# Patient Record
Sex: FEMALE | Race: WHITE | NOT HISPANIC OR LATINO | Employment: OTHER | URBAN - NONMETROPOLITAN AREA
[De-identification: names, ages, dates, MRNs, and addresses within clinical notes are randomized per-mention and may not be internally consistent; named-entity substitution may affect disease eponyms.]

---

## 2017-01-11 ENCOUNTER — OFFICE VISIT (OUTPATIENT)
Dept: PSYCHIATRY | Facility: CLINIC | Age: 72
End: 2017-01-11

## 2017-01-11 VITALS
SYSTOLIC BLOOD PRESSURE: 121 MMHG | WEIGHT: 174 LBS | BODY MASS INDEX: 27.31 KG/M2 | HEART RATE: 97 BPM | HEIGHT: 67 IN | DIASTOLIC BLOOD PRESSURE: 62 MMHG

## 2017-01-11 DIAGNOSIS — F31.30 BIPOLAR I DISORDER, MOST RECENT EPISODE DEPRESSED (HCC): Primary | ICD-10-CM

## 2017-01-11 DIAGNOSIS — F41.9 ANXIETY DISORDER, UNSPECIFIED TYPE: ICD-10-CM

## 2017-01-11 DIAGNOSIS — F03.90 DEMENTIA WITHOUT BEHAVIORAL DISTURBANCE, UNSPECIFIED DEMENTIA TYPE: ICD-10-CM

## 2017-01-11 PROCEDURE — 99213 OFFICE O/P EST LOW 20 MIN: CPT | Performed by: NURSE PRACTITIONER

## 2017-01-11 RX ORDER — DULOXETIN HYDROCHLORIDE 60 MG/1
60 CAPSULE, DELAYED RELEASE ORAL 2 TIMES DAILY
Qty: 60 CAPSULE | Refills: 1 | Status: SHIPPED | OUTPATIENT
Start: 2017-01-11 | End: 2017-02-28 | Stop reason: SDUPTHER

## 2017-01-11 RX ORDER — TRAZODONE HYDROCHLORIDE 150 MG/1
150 TABLET ORAL NIGHTLY
Qty: 30 TABLET | Refills: 1 | Status: SHIPPED | OUTPATIENT
Start: 2017-01-11 | End: 2017-02-28

## 2017-01-11 RX ORDER — QUETIAPINE 150 MG/1
150 TABLET, FILM COATED, EXTENDED RELEASE ORAL NIGHTLY
Qty: 30 TABLET | Refills: 1 | Status: SHIPPED | OUTPATIENT
Start: 2017-01-11 | End: 2017-02-06 | Stop reason: CLARIF

## 2017-01-11 RX ORDER — BUPROPION HYDROCHLORIDE 300 MG/1
300 TABLET ORAL EVERY MORNING
Qty: 30 TABLET | Refills: 1 | Status: SHIPPED | OUTPATIENT
Start: 2017-01-11 | End: 2017-02-28 | Stop reason: SDUPTHER

## 2017-01-11 RX ORDER — DIVALPROEX SODIUM 500 MG/1
500 TABLET, DELAYED RELEASE ORAL NIGHTLY
Qty: 30 TABLET | Refills: 1 | Status: ON HOLD | OUTPATIENT
Start: 2017-01-11 | End: 2017-02-09

## 2017-01-11 NOTE — MR AVS SNAPSHOT
Sunshine Her   1/11/2017 3:40 PM   Office Visit    Dept Phone:  [phone]   Encounter #:  [id number]    Provider:  MONTANA Jose   Department:  [facility] BEHAVIORAL HEALTH                Your Full Care Plan              Today's Medication Changes          These changes are accurate as of: 1/11/17  4:46 PM.  If you have any questions, ask your nurse or doctor.               New Medication(s)Ordered:     divalproex 500 MG DR tablet   Commonly known as:  DEPAKOTE   Take 1 tablet by mouth Every Night.         Medication(s)that have changed:     traZODone 150 MG tablet   Commonly known as:  DESYREL   Take 1 tablet by mouth Every Night.   What changed:  See the new instructions.            Where to Get Your Medications      These medications were sent to [facility] [address] [place] [address] [phone]  - [phone]   [address] [place] [address]     Phone:  [phone]     buPROPion  MG 24 hr tablet    divalproex 500 MG DR tablet    DULoxetine 60 MG capsule    QUEtiapine  MG 24 hr tablet    traZODone 150 MG tablet                  Your Updated Medication List          This list is accurate as of: 1/11/17  4:46 PM.  Always use your most recent med list.                acetaminophen 325 MG tablet   Commonly known as:  TYLENOL   Take 2 tablets by mouth Every 4 (Four) Hours As Needed for mild pain (1-3) or fever (Temperature greater than or equal to 37 C).       amLODIPine 5 MG tablet   Commonly known as:  NORVASC   Take 1 tablet by mouth Daily.       aspirin 81 MG chewable tablet   Chew 1 tablet Daily.       atorvastatin 40 MG tablet   Commonly known as:  LIPITOR   Take 1 tablet by mouth Daily.       buPROPion  MG 24 hr tablet   Commonly known as:  WELLBUTRIN XL   Take 1 tablet by mouth Every Morning.       clopidogrel 75 MG tablet   Commonly known as:  PLAVIX   Take 1 tablet by mouth Daily.       divalproex 500 MG  DR tablet   Commonly known as:  DEPAKOTE   Take 1 tablet by mouth Every Night.       donepezil 10 MG tablet   Commonly known as:  ARICEPT   Take 1 tablet by mouth Daily.       DULoxetine 60 MG capsule   Commonly known as:  CYMBALTA   Take 1 capsule by mouth 2 (Two) Times a Day.       esomeprazole 40 MG capsule   Commonly known as:  nexIUM       fluticasone-salmeterol 100-50 MCG/DOSE DISKUS   Commonly known as:  ADVAIR       levothyroxine 25 MCG tablet   Commonly known as:  SYNTHROID, LEVOTHROID       memantine 10 MG tablet   Commonly known as:  NAMENDA   Take 1 tablet by mouth 2 (two) times a day.       nicotine 21 MG/24HR patch   Commonly known as:  NICODERM CQ   Place 1 patch on the skin Daily.       QUEtiapine  MG 24 hr tablet   Commonly known as:  SEROquel XR   Take 1 tablet by mouth Every Night.       traZODone 150 MG tablet   Commonly known as:  DESYREL   Take 1 tablet by mouth Every Night.               You Were Diagnosed With        Codes Comments    Bipolar I disorder, most recent episode depressed    -  Primary ICD-10-CM: F31.30  ICD-9-CM: 296.50     Anxiety disorder, unspecified type     ICD-10-CM: F41.9  ICD-9-CM: 300.00     Dementia without behavioral disturbance, unspecified dementia type     ICD-10-CM: F03.90  ICD-9-CM: 294.20       Instructions     None    Patient Instructions History      Upcoming Appointments     Visit Type Date Time Department    MEDICINE CHECK 1/11/2017  3:40 PM [facility]    FOLLOW UP 2/8/2017 11:30 AM [facility] CHRISTINA    MEDICINE CHECK 2/9/2017 11:00 AM [facility] CACERES      [employer] Signup     [facility] [employer] allows you to send messages to your doctor, view your test results, renew your prescriptions, schedule appointments, and more. To sign up, go to [employer] and click on the Sign Up Now link in the New User? box. Enter your [employer] Activation Code exactly as it appears below along with the last four digits of your Social Security Number  "and your Date of Birth () to complete the sign-up process. If you do not sign up before the expiration date, you must request a new code.    [employer] Activation Code: [email or web address]  Expires: 2017  4:46 PM    If you have questions, you can email [email or web address] or call [phone] to talk to our [employer] staff. Remember, [employer] is NOT to be used for urgent needs. For medical emergencies, dial 911.               Other Info from Your Visit           Your Appointments     2017 11:30 AM EST   Follow Up with Anastacio Adamson MD   [facility] NEUROLOGY (--)    [address] [place] [address]. [address]  [facility] [address] [phone]           Arrive 15 minutes prior to appointment.            2017 11:00 AM EST   Medicine Check with MONTANA Jose   [facility] BEHAVIORAL HEALTH (--)    [address] [facility] [address] [phone]              Allergies     No Known Allergies      Vital Signs     Blood Pressure Pulse Height Weight Body Mass Index Smoking Status    121/62 97 67\" (170.2 cm) 174 lb (78.9 kg) 27.25 kg/m2 Current Every Day Smoker      Problems and Diagnoses Noted     Dementia    Bipolar I disorder, most recent episode depressed    -  Primary    Anxiety disorder, unspecified type            "

## 2017-01-11 NOTE — PROGRESS NOTES
Stacy Her is a 71 y.o. female who is here today for medication management follow up at the [facility], she presents with her daughter with whom she gives permission to speak openly in front of.  She presents to her appointment on time.      Chief Complaint:  Follow-up     History of Present Illness  She states that she is doing well with her medications, denies any SE.  She states that she is not able to sleep because of racing thoughts, been picking at herself with anxiety.  She shares that she is sleeping less than about 6 hours per night with no NM.  She feels slightly, denies any feelings of being hyper, however she feels in a good mood and noted to be rapidly speaking.  She states that she has been worrying more lately, especially since last stroke.  She states that she is eating well, noted to have gained some weight.  She denies any new stressors but feels jittery.  She denies any new health issues.  Denies any AV hallucinations, denies any SI/HI.  Sh was previously on depakote but taken off because of increased sedation, was on it twice, will take at night.      The following portions of the patient's history were reviewed and updated as appropriate: allergies, current medications, past family history, past medical history, past social history, past surgical history and problem list.    Review of Systems   Constitutional: Negative for appetite change, chills, diaphoresis, fatigue, fever and unexpected weight change.   HENT: Negative for hearing loss, sore throat, trouble swallowing and voice change.    Eyes: Negative for photophobia and visual disturbance.   Respiratory: Negative for cough, chest tightness and shortness of breath.    Cardiovascular: Negative for chest pain and palpitations.   Gastrointestinal: Negative for abdominal pain, constipation, nausea and vomiting.   Endocrine: Negative for cold intolerance and heat intolerance.   Genitourinary: Negative for dysuria and frequency.  "  Musculoskeletal: Positive for gait problem. Negative for arthralgias, back pain, joint swelling and neck stiffness.   Skin: Negative for color change and wound.   Allergic/Immunologic: Negative for environmental allergies and immunocompromised state.   Neurological: Negative for dizziness, tremors, seizures, syncope, weakness, light-headedness and headaches.   Hematological: Negative for adenopathy. Does not bruise/bleed easily.       Objective   Physical Exam   Constitutional: She appears well-developed and well-nourished. No distress.   Neurological: She is alert. Coordination and gait normal.   Vitals reviewed.    Blood pressure 121/62, pulse 97, height 67\" (170.2 cm), weight 174 lb (78.9 kg).    No Known Allergies    Current Medications:   Current Outpatient Prescriptions   Medication Sig Dispense Refill   • acetaminophen (TYLENOL) 325 MG tablet Take 2 tablets by mouth Every 4 (Four) Hours As Needed for mild pain (1-3) or fever (Temperature greater than or equal to 37 C).  0   • amLODIPine (NORVASC) 5 MG tablet Take 1 tablet by mouth Daily. 30 tablet 0   • aspirin 81 MG chewable tablet Chew 1 tablet Daily.     • atorvastatin (LIPITOR) 40 MG tablet Take 1 tablet by mouth Daily. (Patient taking differently: Take 40 mg by mouth Every Night.)     • buPROPion XL (WELLBUTRIN XL) 300 MG 24 hr tablet Take 1 tablet by mouth Every Morning. 30 tablet 1   • clopidogrel (PLAVIX) 75 MG tablet Take 1 tablet by mouth Daily. 30 tablet    • divalproex (DEPAKOTE) 500 MG DR tablet Take 1 tablet by mouth Every Night. 30 tablet 1   • donepezil (ARICEPT) 10 MG tablet Take 1 tablet by mouth Daily. 30 tablet 11   • DULoxetine (CYMBALTA) 60 MG capsule Take 1 capsule by mouth 2 (Two) Times a Day. 60 capsule 1   • esomeprazole (NexIUM) 40 MG capsule Take 40 mg by mouth every morning before breakfast.     • fluticasone-salmeterol (ADVAIR) 100-50 MCG/DOSE DISKUS Inhale 1 puff 2 (Two) Times a Day. Patients family states they are not sure " this is the right mg but her pharmacy is [facility].     • levothyroxine (SYNTHROID, LEVOTHROID) 25 MCG tablet Take 25 mcg by mouth daily.     • memantine (NAMENDA) 10 MG tablet Take 1 tablet by mouth 2 (two) times a day. 60 tablet 1   • nicotine (NICODERM CQ) 21 MG/24HR patch Place 1 patch on the skin Daily.     • QUEtiapine XR (SEROquel XR) 150 MG 24 hr tablet Take 1 tablet by mouth Every Night. 30 tablet 1   • traZODone (DESYREL) 150 MG tablet Take 1 tablet by mouth Every Night. 30 tablet 1     No current facility-administered medications for this visit.        Mental Status Exam:   Hygiene:   good  Cooperation:  Cooperative  Eye Contact:  Fair  Psychomotor Behavior:  Appropriate  Affect:  Appropriate  Hopelessness: Denies  Speech:  Normal  Thought Process:  Goal directed  Thought Content:  Normal  Suicidal:  None  Homicidal:  None  Hallucinations:  None  Delusion:  None  Memory:  Intact  Orientation:  Person, Place, Time and Situation  Reliability:  fair  Insight:  Fair  Judgement:  Fair  Impulse Control:  Fair  Physical/Medical Issues:  No     Assessment/Plan   Bipolar I disorder, most recent episode depressed    Anxiety disorder, unspecified type    Dementia without behavioral disturbance, unspecified dementia type    Other orders  -     buPROPion XL (WELLBUTRIN XL) 300 MG 24 hr tablet; Take 1 tablet by mouth Every Morning.  -     DULoxetine (CYMBALTA) 60 MG capsule; Take 1 capsule by mouth 2 (Two) Times a Day.  -     QUEtiapine XR (SEROquel XR) 150 MG 24 hr tablet; Take 1 tablet by mouth Every Night.  -     traZODone (DESYREL) 150 MG tablet; Take 1 tablet by mouth Every Night.  -     divalproex (DEPAKOTE) 500 MG DR tablet; Take 1 tablet by mouth Every Night.      Discused medications options.  Continue current medications, add depakote at night.    Discussed the risks, beneefits, and side effects of the medications; patient ackowledged and verbally consentedd.  Patient is aware to call the [facility] with  any worsening of symptoms.  Patient is agreeable to call 911 or go to the nearest ER should he/she begin having SI/HI.    Return in 4 weeks

## 2017-02-06 ENCOUNTER — TELEPHONE (OUTPATIENT)
Dept: PSYCHIATRY | Facility: CLINIC | Age: 72
End: 2017-02-06

## 2017-02-06 DIAGNOSIS — F31.32 BIPOLAR AFFECTIVE DISORDER, CURRENTLY DEPRESSED, MODERATE (HCC): Primary | ICD-10-CM

## 2017-02-06 RX ORDER — QUETIAPINE FUMARATE 100 MG/1
150 TABLET, FILM COATED ORAL NIGHTLY
Qty: 45 TABLET | Refills: 1 | Status: SHIPPED | OUTPATIENT
Start: 2017-02-06 | End: 2017-02-28 | Stop reason: SDUPTHER

## 2017-02-06 NOTE — TELEPHONE ENCOUNTER
Patient's daughter came in and said that patients insurance will not cover the Seroquel xr she is asking that you change it to the regular Seroquel. She is asking that you call her. [phone] or ext #[phone] up stairs

## 2017-02-09 ENCOUNTER — APPOINTMENT (OUTPATIENT)
Dept: CT IMAGING | Facility: HOSPITAL | Age: 72
End: 2017-02-09

## 2017-02-09 ENCOUNTER — HOSPITAL ENCOUNTER (OUTPATIENT)
Facility: HOSPITAL | Age: 72
Setting detail: OBSERVATION
Discharge: HOME | End: 2017-02-12
Attending: INTERNAL MEDICINE | Admitting: FAMILY MEDICINE

## 2017-02-09 ENCOUNTER — HOSPITAL ENCOUNTER (EMERGENCY)
Facility: HOSPITAL | Age: 72
Discharge: ANOTHER HOSPITAL | End: 2017-02-09
Attending: FAMILY MEDICINE | Admitting: FAMILY MEDICINE

## 2017-02-09 ENCOUNTER — APPOINTMENT (OUTPATIENT)
Dept: GENERAL RADIOLOGY | Facility: HOSPITAL | Age: 72
End: 2017-02-09

## 2017-02-09 ENCOUNTER — APPOINTMENT (OUTPATIENT)
Dept: MRI IMAGING | Facility: HOSPITAL | Age: 72
End: 2017-02-09

## 2017-02-09 VITALS
WEIGHT: 167 LBS | HEIGHT: 67 IN | OXYGEN SATURATION: 98 % | BODY MASS INDEX: 26.21 KG/M2 | RESPIRATION RATE: 17 BRPM | DIASTOLIC BLOOD PRESSURE: 77 MMHG | TEMPERATURE: 98.3 F | SYSTOLIC BLOOD PRESSURE: 149 MMHG | HEART RATE: 85 BPM

## 2017-02-09 DIAGNOSIS — I44.1 AV BLOCK, 2ND DEGREE: ICD-10-CM

## 2017-02-09 DIAGNOSIS — Z78.9 IMPAIRED MOBILITY AND ADLS: ICD-10-CM

## 2017-02-09 DIAGNOSIS — Z74.09 IMPAIRED FUNCTIONAL MOBILITY, BALANCE, GAIT, AND ENDURANCE: Primary | ICD-10-CM

## 2017-02-09 DIAGNOSIS — I67.2 ARTERIOSCLEROTIC CEREBROVASCULAR DISEASE: ICD-10-CM

## 2017-02-09 DIAGNOSIS — Z74.09 IMPAIRED MOBILITY AND ADLS: ICD-10-CM

## 2017-02-09 DIAGNOSIS — R42 POSTURAL DIZZINESS WITH PRESYNCOPE: Primary | ICD-10-CM

## 2017-02-09 DIAGNOSIS — R55 POSTURAL DIZZINESS WITH PRESYNCOPE: Primary | ICD-10-CM

## 2017-02-09 PROBLEM — F01.50 VASCULAR DEMENTIA (HCC): Status: ACTIVE | Noted: 2017-02-09

## 2017-02-09 PROBLEM — Z72.0 TOBACCO ABUSE: Status: ACTIVE | Noted: 2017-02-09

## 2017-02-09 PROBLEM — I63.9 CVA (CEREBRAL VASCULAR ACCIDENT): Status: ACTIVE | Noted: 2017-02-09

## 2017-02-09 PROBLEM — I10 HYPERTENSION: Status: ACTIVE | Noted: 2017-02-09

## 2017-02-09 PROBLEM — D64.9 NORMOCYTIC ANEMIA: Status: ACTIVE | Noted: 2017-02-09

## 2017-02-09 PROBLEM — E78.5 HYPERLIPIDEMIA: Status: ACTIVE | Noted: 2017-02-09

## 2017-02-09 PROBLEM — N18.30 CKD (CHRONIC KIDNEY DISEASE), STAGE III (HCC): Status: ACTIVE | Noted: 2017-02-09

## 2017-02-09 LAB
ABO GROUP BLD: NORMAL
ALBUMIN SERPL-MCNC: 4.1 G/DL (ref 3.2–4.8)
ALBUMIN SERPL-MCNC: 4.4 G/DL (ref 3.4–4.8)
ALBUMIN/GLOB SERPL: 1.4 G/DL (ref 1.5–2.5)
ALBUMIN/GLOB SERPL: 1.4 G/DL (ref 1.5–2.5)
ALP SERPL-CCNC: 104 U/L (ref 46–116)
ALP SERPL-CCNC: 90 U/L (ref 25–100)
ALT SERPL W P-5'-P-CCNC: 12 U/L (ref 10–36)
ALT SERPL W P-5'-P-CCNC: 12 U/L (ref 7–40)
AMPHET+METHAMPHET UR QL: NEGATIVE
AMYLASE SERPL-CCNC: 158 U/L (ref 28–100)
ANION GAP SERPL CALCULATED.3IONS-SCNC: 14 MMOL/L (ref 3–11)
ANION GAP SERPL CALCULATED.3IONS-SCNC: 5.7 MMOL/L (ref 3.6–11.2)
ANISOCYTOSIS BLD QL: NORMAL
APTT PPP: 23.5 SECONDS (ref 24.4–31)
AST SERPL-CCNC: 16 U/L (ref 0–33)
AST SERPL-CCNC: 19 U/L (ref 10–30)
BACTERIA UR QL AUTO: ABNORMAL /HPF
BARBITURATES UR QL SCN: NEGATIVE
BASOPHILS # BLD AUTO: 0.03 10*3/MM3 (ref 0–0.2)
BASOPHILS # BLD AUTO: 0.03 10*3/MM3 (ref 0–0.3)
BASOPHILS NFR BLD AUTO: 0.5 % (ref 0–1)
BASOPHILS NFR BLD AUTO: 0.6 % (ref 0–2)
BENZODIAZ UR QL SCN: NEGATIVE
BILIRUB SERPL-MCNC: 0.2 MG/DL (ref 0.2–1.8)
BILIRUB SERPL-MCNC: 0.2 MG/DL (ref 0.3–1.2)
BILIRUB UR QL STRIP: NEGATIVE
BLD GP AB SCN SERPL QL: NEGATIVE
BNP SERPL-MCNC: 50 PG/ML (ref 0–100)
BUN BLD-MCNC: 23 MG/DL (ref 9–23)
BUN BLD-MCNC: 27 MG/DL (ref 7–21)
BUN/CREAT SERPL: 16.1 (ref 7–25)
BUN/CREAT SERPL: 16.4 (ref 7–25)
CALCIUM SPEC-SCNC: 9.2 MG/DL (ref 8.7–10.4)
CALCIUM SPEC-SCNC: 9.4 MG/DL (ref 7.7–10)
CANNABINOIDS SERPL QL: NEGATIVE
CHLORIDE SERPL-SCNC: 106 MMOL/L (ref 99–109)
CHLORIDE SERPL-SCNC: 106 MMOL/L (ref 99–112)
CK MB SERPL-CCNC: <0.18 NG/ML (ref 0–5)
CK MB SERPL-RTO: NORMAL % (ref 0–3)
CK SERPL-CCNC: 56 U/L (ref 24–173)
CLARITY UR: CLEAR
CO2 SERPL-SCNC: 19 MMOL/L (ref 20–31)
CO2 SERPL-SCNC: 28.3 MMOL/L (ref 24.3–31.9)
COCAINE UR QL: NEGATIVE
COLOR UR: YELLOW
CREAT BLD-MCNC: 1.4 MG/DL (ref 0.6–1.3)
CREAT BLD-MCNC: 1.68 MG/DL (ref 0.43–1.29)
CRP SERPL-MCNC: <0.5 MG/DL (ref 0–0.99)
DACRYOCYTES BLD QL SMEAR: NORMAL
DEPRECATED RDW RBC AUTO: 47.3 FL (ref 37–54)
DEPRECATED RDW RBC AUTO: 48.2 FL (ref 37–54)
EOSINOPHIL # BLD AUTO: 0.21 10*3/MM3 (ref 0–0.7)
EOSINOPHIL # BLD AUTO: 0.27 10*3/MM3 (ref 0.1–0.3)
EOSINOPHIL NFR BLD AUTO: 4.1 % (ref 0–7)
EOSINOPHIL NFR BLD AUTO: 4.6 % (ref 0–3)
ERYTHROCYTE [DISTWIDTH] IN BLOOD BY AUTOMATED COUNT: 15.9 % (ref 11.3–14.5)
ERYTHROCYTE [DISTWIDTH] IN BLOOD BY AUTOMATED COUNT: 16.1 % (ref 11.5–14.5)
ERYTHROCYTE [SEDIMENTATION RATE] IN BLOOD: 28 MM/HR (ref 0–30)
GFR SERPL CREATININE-BSD FRML MDRD: 30 ML/MIN/1.73
GFR SERPL CREATININE-BSD FRML MDRD: 37 ML/MIN/1.73
GLOBULIN UR ELPH-MCNC: 2.9 GM/DL
GLOBULIN UR ELPH-MCNC: 3.1 GM/DL
GLUCOSE BLD-MCNC: 102 MG/DL (ref 70–110)
GLUCOSE BLD-MCNC: 85 MG/DL (ref 70–100)
GLUCOSE BLDC GLUCOMTR-MCNC: 88 MG/DL (ref 70–130)
GLUCOSE UR STRIP-MCNC: NEGATIVE MG/DL
HCT VFR BLD AUTO: 26 % (ref 34.5–44)
HCT VFR BLD AUTO: 26.6 % (ref 37–47)
HGB BLD-MCNC: 7.5 G/DL (ref 11.5–15.5)
HGB BLD-MCNC: 7.5 G/DL (ref 12–16)
HGB UR QL STRIP.AUTO: NEGATIVE
HYALINE CASTS UR QL AUTO: ABNORMAL /LPF
HYPOCHROMIA BLD QL: NORMAL
IMM GRANULOCYTES # BLD: 0.03 10*3/MM3 (ref 0–0.03)
IMM GRANULOCYTES # BLD: 0.04 10*3/MM3 (ref 0–0.03)
IMM GRANULOCYTES NFR BLD: 0.5 % (ref 0–0.6)
IMM GRANULOCYTES NFR BLD: 0.8 % (ref 0–0.5)
INR PPP: 0.94 (ref 0.8–1.1)
IRON 24H UR-MRATE: 22 MCG/DL (ref 49–151)
IRON SATN MFR SERPL: 5 % (ref 15–50)
KETONES UR QL STRIP: NEGATIVE
LEUKOCYTE ESTERASE UR QL STRIP.AUTO: ABNORMAL
LIPASE SERPL-CCNC: 27 U/L (ref 13–60)
LYMPHOCYTES # BLD AUTO: 1.2 10*3/MM3 (ref 1–3)
LYMPHOCYTES # BLD AUTO: 1.48 10*3/MM3 (ref 0.6–4.8)
LYMPHOCYTES NFR BLD AUTO: 23.2 % (ref 16–46)
LYMPHOCYTES NFR BLD AUTO: 25.3 % (ref 24–44)
MAGNESIUM SERPL-MCNC: 2.2 MG/DL (ref 1.7–2.6)
MCH RBC QN AUTO: 22.9 PG (ref 27–33)
MCH RBC QN AUTO: 23.1 PG (ref 27–31)
MCHC RBC AUTO-ENTMCNC: 28.2 G/DL (ref 33–37)
MCHC RBC AUTO-ENTMCNC: 28.8 G/DL (ref 32–36)
MCV RBC AUTO: 80 FL (ref 80–99)
MCV RBC AUTO: 81.3 FL (ref 80–94)
METHADONE UR QL SCN: NEGATIVE
MONOCYTES # BLD AUTO: 0.54 10*3/MM3 (ref 0–1)
MONOCYTES # BLD AUTO: 0.62 10*3/MM3 (ref 0.1–0.9)
MONOCYTES NFR BLD AUTO: 12 % (ref 0–12)
MONOCYTES NFR BLD AUTO: 9.2 % (ref 0–12)
MYOGLOBIN SERPL-MCNC: 48 NG/ML (ref 0–109)
NEUTROPHILS # BLD AUTO: 3.07 10*3/MM3 (ref 1.4–6.5)
NEUTROPHILS # BLD AUTO: 3.5 10*3/MM3 (ref 1.5–8.3)
NEUTROPHILS NFR BLD AUTO: 59.3 % (ref 40–75)
NEUTROPHILS NFR BLD AUTO: 59.9 % (ref 41–71)
NITRITE UR QL STRIP: NEGATIVE
OPIATES UR QL: NEGATIVE
OSMOLALITY SERPL CALC.SUM OF ELEC: 284.7 MOSM/KG (ref 273–305)
OVALOCYTES BLD QL SMEAR: NORMAL
OXYCODONE UR QL SCN: NEGATIVE
PCP UR QL SCN: NEGATIVE
PH UR STRIP.AUTO: 8 [PH] (ref 5–8)
PHOSPHATE SERPL-MCNC: 3.1 MG/DL (ref 2.7–4.5)
PLAT MORPH BLD: NORMAL
PLATELET # BLD AUTO: 186 10*3/MM3 (ref 130–400)
PLATELET # BLD AUTO: 187 10*3/MM3 (ref 150–450)
PMV BLD AUTO: 8.1 FL (ref 6–12)
PMV BLD AUTO: 9 FL (ref 6–10)
POTASSIUM BLD-SCNC: 3.9 MMOL/L (ref 3.5–5.5)
POTASSIUM BLD-SCNC: 4.8 MMOL/L (ref 3.5–5.3)
PROPOXYPH UR QL: NEGATIVE
PROT SERPL-MCNC: 7 G/DL (ref 5.7–8.2)
PROT SERPL-MCNC: 7.5 G/DL (ref 6–8)
PROT UR QL STRIP: NEGATIVE
PROTHROMBIN TIME: 10.6 SECONDS (ref 9.8–11.9)
RBC # BLD AUTO: 3.25 10*6/MM3 (ref 3.89–5.14)
RBC # BLD AUTO: 3.27 10*6/MM3 (ref 4.2–5.4)
RBC # UR: ABNORMAL /HPF
REF LAB TEST METHOD: ABNORMAL
RETICS/RBC NFR AUTO: 2.46 % (ref 0.5–1.5)
RH BLD: POSITIVE
SODIUM BLD-SCNC: 139 MMOL/L (ref 132–146)
SODIUM BLD-SCNC: 140 MMOL/L (ref 135–153)
SP GR UR STRIP: 1.02 (ref 1–1.03)
SQUAMOUS #/AREA URNS HPF: ABNORMAL /HPF
TIBC SERPL-MCNC: 439 MCG/DL (ref 241–421)
TROPONIN I SERPL-MCNC: <0.006 NG/ML
TROPONIN I SERPL-MCNC: <0.006 NG/ML
TSH SERPL DL<=0.05 MIU/L-ACNC: 1.61 MIU/ML (ref 0.55–4.78)
UROBILINOGEN UR QL STRIP: ABNORMAL
VALPROATE SERPL-MCNC: <1 MCG/ML (ref 50–100)
WBC NRBC COR # BLD: 5.17 10*3/MM3 (ref 4.5–12.5)
WBC NRBC COR # BLD: 5.85 10*3/MM3 (ref 3.5–10.8)
WBC UR QL AUTO: ABNORMAL /HPF

## 2017-02-09 PROCEDURE — 82550 ASSAY OF CK (CPK): CPT | Performed by: FAMILY MEDICINE

## 2017-02-09 PROCEDURE — 86901 BLOOD TYPING SEROLOGIC RH(D): CPT

## 2017-02-09 PROCEDURE — 70450 CT HEAD/BRAIN W/O DYE: CPT

## 2017-02-09 PROCEDURE — 93005 ELECTROCARDIOGRAM TRACING: CPT | Performed by: NURSE PRACTITIONER

## 2017-02-09 PROCEDURE — 86900 BLOOD TYPING SEROLOGIC ABO: CPT

## 2017-02-09 PROCEDURE — 83880 ASSAY OF NATRIURETIC PEPTIDE: CPT | Performed by: NURSE PRACTITIONER

## 2017-02-09 PROCEDURE — 86140 C-REACTIVE PROTEIN: CPT | Performed by: FAMILY MEDICINE

## 2017-02-09 PROCEDURE — 25010000002 MAGNESIUM SULFATE PER 500 MG OF MAGNESIUM: Performed by: FAMILY MEDICINE

## 2017-02-09 PROCEDURE — 96366 THER/PROPH/DIAG IV INF ADDON: CPT

## 2017-02-09 PROCEDURE — 80307 DRUG TEST PRSMV CHEM ANLYZR: CPT | Performed by: FAMILY MEDICINE

## 2017-02-09 PROCEDURE — 25810000003 DEXTROSE-NACL PER 500 ML: Performed by: FAMILY MEDICINE

## 2017-02-09 PROCEDURE — 93005 ELECTROCARDIOGRAM TRACING: CPT | Performed by: FAMILY MEDICINE

## 2017-02-09 PROCEDURE — 83690 ASSAY OF LIPASE: CPT | Performed by: FAMILY MEDICINE

## 2017-02-09 PROCEDURE — 25010000002 THIAMINE PER 100 MG: Performed by: FAMILY MEDICINE

## 2017-02-09 PROCEDURE — 85730 THROMBOPLASTIN TIME PARTIAL: CPT | Performed by: FAMILY MEDICINE

## 2017-02-09 PROCEDURE — G0378 HOSPITAL OBSERVATION PER HR: HCPCS

## 2017-02-09 PROCEDURE — 85652 RBC SED RATE AUTOMATED: CPT | Performed by: FAMILY MEDICINE

## 2017-02-09 PROCEDURE — 83735 ASSAY OF MAGNESIUM: CPT | Performed by: FAMILY MEDICINE

## 2017-02-09 PROCEDURE — 84484 ASSAY OF TROPONIN QUANT: CPT | Performed by: FAMILY MEDICINE

## 2017-02-09 PROCEDURE — 85610 PROTHROMBIN TIME: CPT | Performed by: FAMILY MEDICINE

## 2017-02-09 PROCEDURE — 85045 AUTOMATED RETICULOCYTE COUNT: CPT | Performed by: INTERNAL MEDICINE

## 2017-02-09 PROCEDURE — 84443 ASSAY THYROID STIM HORMONE: CPT | Performed by: FAMILY MEDICINE

## 2017-02-09 PROCEDURE — 96365 THER/PROPH/DIAG IV INF INIT: CPT

## 2017-02-09 PROCEDURE — 93010 ELECTROCARDIOGRAM REPORT: CPT | Performed by: INTERNAL MEDICINE

## 2017-02-09 PROCEDURE — 70450 CT HEAD/BRAIN W/O DYE: CPT | Performed by: RADIOLOGY

## 2017-02-09 PROCEDURE — 83540 ASSAY OF IRON: CPT | Performed by: FAMILY MEDICINE

## 2017-02-09 PROCEDURE — 80053 COMPREHEN METABOLIC PANEL: CPT | Performed by: NURSE PRACTITIONER

## 2017-02-09 PROCEDURE — 86850 RBC ANTIBODY SCREEN: CPT

## 2017-02-09 PROCEDURE — 82746 ASSAY OF FOLIC ACID SERUM: CPT | Performed by: NURSE PRACTITIONER

## 2017-02-09 PROCEDURE — 85007 BL SMEAR W/DIFF WBC COUNT: CPT | Performed by: FAMILY MEDICINE

## 2017-02-09 PROCEDURE — 80053 COMPREHEN METABOLIC PANEL: CPT | Performed by: FAMILY MEDICINE

## 2017-02-09 PROCEDURE — 70551 MRI BRAIN STEM W/O DYE: CPT

## 2017-02-09 PROCEDURE — 80164 ASSAY DIPROPYLACETIC ACD TOT: CPT | Performed by: FAMILY MEDICINE

## 2017-02-09 PROCEDURE — 83550 IRON BINDING TEST: CPT | Performed by: FAMILY MEDICINE

## 2017-02-09 PROCEDURE — 71010 HC CHEST PA OR AP: CPT

## 2017-02-09 PROCEDURE — 84100 ASSAY OF PHOSPHORUS: CPT | Performed by: FAMILY MEDICINE

## 2017-02-09 PROCEDURE — 85045 AUTOMATED RETICULOCYTE COUNT: CPT | Performed by: NURSE PRACTITIONER

## 2017-02-09 PROCEDURE — 81001 URINALYSIS AUTO W/SCOPE: CPT | Performed by: FAMILY MEDICINE

## 2017-02-09 PROCEDURE — 82728 ASSAY OF FERRITIN: CPT | Performed by: NURSE PRACTITIONER

## 2017-02-09 PROCEDURE — 83540 ASSAY OF IRON: CPT | Performed by: NURSE PRACTITIONER

## 2017-02-09 PROCEDURE — 84484 ASSAY OF TROPONIN QUANT: CPT | Performed by: NURSE PRACTITIONER

## 2017-02-09 PROCEDURE — 99285 EMERGENCY DEPT VISIT HI MDM: CPT

## 2017-02-09 PROCEDURE — 83874 ASSAY OF MYOGLOBIN: CPT | Performed by: FAMILY MEDICINE

## 2017-02-09 PROCEDURE — 82962 GLUCOSE BLOOD TEST: CPT

## 2017-02-09 PROCEDURE — 87086 URINE CULTURE/COLONY COUNT: CPT | Performed by: FAMILY MEDICINE

## 2017-02-09 PROCEDURE — 82607 VITAMIN B-12: CPT | Performed by: NURSE PRACTITIONER

## 2017-02-09 PROCEDURE — 93005 ELECTROCARDIOGRAM TRACING: CPT | Performed by: INTERNAL MEDICINE

## 2017-02-09 PROCEDURE — 85025 COMPLETE CBC W/AUTO DIFF WBC: CPT | Performed by: NURSE PRACTITIONER

## 2017-02-09 PROCEDURE — 99220 PR INITIAL OBSERVATION CARE/DAY 70 MINUTES: CPT | Performed by: INTERNAL MEDICINE

## 2017-02-09 PROCEDURE — 83550 IRON BINDING TEST: CPT | Performed by: NURSE PRACTITIONER

## 2017-02-09 PROCEDURE — 85025 COMPLETE CBC W/AUTO DIFF WBC: CPT | Performed by: FAMILY MEDICINE

## 2017-02-09 PROCEDURE — 82553 CREATINE MB FRACTION: CPT | Performed by: FAMILY MEDICINE

## 2017-02-09 PROCEDURE — 71010 XR CHEST 1 VW: CPT | Performed by: RADIOLOGY

## 2017-02-09 PROCEDURE — 82150 ASSAY OF AMYLASE: CPT | Performed by: FAMILY MEDICINE

## 2017-02-09 PROCEDURE — 86920 COMPATIBILITY TEST SPIN: CPT

## 2017-02-09 RX ORDER — BUPROPION HYDROCHLORIDE 150 MG/1
300 TABLET ORAL
Status: DISCONTINUED | OUTPATIENT
Start: 2017-02-10 | End: 2017-02-12 | Stop reason: HOSPADM

## 2017-02-09 RX ORDER — DONEPEZIL HYDROCHLORIDE 10 MG/1
10 TABLET, FILM COATED ORAL DAILY
Status: DISCONTINUED | OUTPATIENT
Start: 2017-02-10 | End: 2017-02-12 | Stop reason: HOSPADM

## 2017-02-09 RX ORDER — NICOTINE 21 MG/24HR
1 PATCH, TRANSDERMAL 24 HOURS TRANSDERMAL
Status: DISCONTINUED | OUTPATIENT
Start: 2017-02-10 | End: 2017-02-12 | Stop reason: HOSPADM

## 2017-02-09 RX ORDER — DULOXETIN HYDROCHLORIDE 60 MG/1
60 CAPSULE, DELAYED RELEASE ORAL 2 TIMES DAILY
Status: DISCONTINUED | OUTPATIENT
Start: 2017-02-10 | End: 2017-02-12 | Stop reason: HOSPADM

## 2017-02-09 RX ORDER — SODIUM CHLORIDE 9 MG/ML
50 INJECTION, SOLUTION INTRAVENOUS CONTINUOUS
Status: DISCONTINUED | OUTPATIENT
Start: 2017-02-09 | End: 2017-02-10

## 2017-02-09 RX ORDER — BUDESONIDE AND FORMOTEROL FUMARATE DIHYDRATE 80; 4.5 UG/1; UG/1
2 AEROSOL RESPIRATORY (INHALATION)
Status: DISCONTINUED | OUTPATIENT
Start: 2017-02-09 | End: 2017-02-12 | Stop reason: HOSPADM

## 2017-02-09 RX ORDER — SODIUM CHLORIDE 0.9 % (FLUSH) 0.9 %
10 SYRINGE (ML) INJECTION AS NEEDED
Status: DISCONTINUED | OUTPATIENT
Start: 2017-02-09 | End: 2017-02-09 | Stop reason: HOSPADM

## 2017-02-09 RX ORDER — SODIUM CHLORIDE 0.9 % (FLUSH) 0.9 %
1-10 SYRINGE (ML) INJECTION AS NEEDED
Status: DISCONTINUED | OUTPATIENT
Start: 2017-02-09 | End: 2017-02-12 | Stop reason: HOSPADM

## 2017-02-09 RX ORDER — DIVALPROEX SODIUM 500 MG/1
500 TABLET, DELAYED RELEASE ORAL NIGHTLY
Status: DISCONTINUED | OUTPATIENT
Start: 2017-02-09 | End: 2017-02-09

## 2017-02-09 RX ORDER — CLOPIDOGREL BISULFATE 75 MG/1
75 TABLET ORAL DAILY
Status: DISCONTINUED | OUTPATIENT
Start: 2017-02-10 | End: 2017-02-10

## 2017-02-09 RX ORDER — MEMANTINE HYDROCHLORIDE 10 MG/1
10 TABLET ORAL 2 TIMES DAILY
Status: DISCONTINUED | OUTPATIENT
Start: 2017-02-10 | End: 2017-02-12 | Stop reason: HOSPADM

## 2017-02-09 RX ORDER — ATORVASTATIN CALCIUM 40 MG/1
80 TABLET, FILM COATED ORAL NIGHTLY
Status: DISCONTINUED | OUTPATIENT
Start: 2017-02-09 | End: 2017-02-12 | Stop reason: HOSPADM

## 2017-02-09 RX ORDER — ASPIRIN 325 MG
325 TABLET ORAL DAILY
Status: DISCONTINUED | OUTPATIENT
Start: 2017-02-10 | End: 2017-02-12 | Stop reason: HOSPADM

## 2017-02-09 RX ORDER — LEVOTHYROXINE SODIUM 0.03 MG/1
25 TABLET ORAL DAILY
Status: DISCONTINUED | OUTPATIENT
Start: 2017-02-10 | End: 2017-02-12 | Stop reason: HOSPADM

## 2017-02-09 RX ORDER — ACETAMINOPHEN 325 MG/1
650 TABLET ORAL EVERY 4 HOURS PRN
Status: DISCONTINUED | OUTPATIENT
Start: 2017-02-09 | End: 2017-02-12 | Stop reason: HOSPADM

## 2017-02-09 RX ORDER — AMLODIPINE BESYLATE 5 MG/1
5 TABLET ORAL
Status: DISCONTINUED | OUTPATIENT
Start: 2017-02-10 | End: 2017-02-12 | Stop reason: HOSPADM

## 2017-02-09 RX ORDER — PANTOPRAZOLE SODIUM 40 MG/1
40 TABLET, DELAYED RELEASE ORAL
Status: DISCONTINUED | OUTPATIENT
Start: 2017-02-10 | End: 2017-02-10

## 2017-02-09 RX ORDER — ASPIRIN 300 MG/1
300 SUPPOSITORY RECTAL DAILY
Status: DISCONTINUED | OUTPATIENT
Start: 2017-02-10 | End: 2017-02-12 | Stop reason: HOSPADM

## 2017-02-09 RX ORDER — ASPIRIN 81 MG/1
81 TABLET, CHEWABLE ORAL DAILY
Status: DISCONTINUED | OUTPATIENT
Start: 2017-02-10 | End: 2017-02-09 | Stop reason: SDUPTHER

## 2017-02-09 RX ADMIN — THIAMINE HYDROCHLORIDE 1000 ML/HR: 100 INJECTION, SOLUTION INTRAMUSCULAR; INTRAVENOUS at 13:13

## 2017-02-09 RX ADMIN — SODIUM CHLORIDE 50 ML/HR: 9 INJECTION, SOLUTION INTRAVENOUS at 23:54

## 2017-02-09 NOTE — ED PROVIDER NOTES
Subjective   History of Present Illness  72 y/o F here w/ several days of worsening weakness and confusion. Pt daughter states that they were walking to her MD appointment this when the pt, who has gait instability and debility at baseline, became unsteady and had to sit down on the floor. Pt denies any CP. No fevers/chills, no SOA. No HA, no numbness/tingling. Pt last had a CVA in Oct 2016. Pt is on plavix and has ICA disease bilaterally with stents bilaterally. Pt has no CAD history.   Review of Systems   Constitutional: Positive for activity change and fatigue. Negative for appetite change, chills and fever.   HENT: Negative for trouble swallowing and voice change.    Eyes: Negative for photophobia and redness.   Respiratory: Negative for apnea, cough, choking, chest tightness, shortness of breath, wheezing and stridor.    Cardiovascular: Negative for chest pain and palpitations.   Gastrointestinal: Negative for abdominal distention, abdominal pain, anal bleeding, constipation, diarrhea, nausea and vomiting.   Genitourinary: Negative for decreased urine volume, difficulty urinating, dysuria, flank pain, frequency, hematuria and urgency.   Musculoskeletal: Positive for gait problem. Negative for arthralgias, back pain and joint swelling.   Skin: Negative for color change and pallor.   Neurological: Positive for weakness and light-headedness. Negative for dizziness, tremors, seizures, syncope, facial asymmetry, speech difficulty, numbness and headaches.   Hematological: Negative for adenopathy.   Psychiatric/Behavioral: Positive for confusion and decreased concentration.       Past Medical History   Diagnosis Date   • Acute on chronic renal insufficiency 7/15/2016   • Anemia    • Anxiety    • Arthritis    • Arthritis    • Bipolar 1 disorder    • Bradycardia    • COPD (chronic obstructive pulmonary disease)    • Dementia    • Disease of thyroid gland    • Generalized weakness 7/14/2016   • GERD (gastroesophageal  "reflux disease)    • Heart disease    • Hyperlipidemia    • Hypertension    • PAD (peripheral artery disease)    • Stroke    • TIA (transient ischemic attack)    • Vertigo        No Known Allergies    Past Surgical History   Procedure Laterality Date   • Carotid endarterectomy Bilateral    • Endoscopy     • Angioplasty subclavian artery     • Renal artery bypass         Family History   Problem Relation Age of Onset   • Heart disease Mother    • Diabetes Mother    • Cancer Father    • Diabetes Sister    • Cancer Brother        Social History     Social History   • Marital status:      Spouse name: N/A   • Number of children: N/A   • Years of education: N/A     Social History Main Topics   • Smoking status: Current Every Day Smoker     Packs/day: 1.00     Years: 40.00     Types: Cigarettes   • Smokeless tobacco: None   • Alcohol use No      Comment: \"used to drink a lot\" none now  (per daughter)   • Drug use: No   • Sexual activity: Defer     Other Topics Concern   • None     Social History Narrative           Objective   Physical Exam   Constitutional: She is oriented to person, place, and time. She appears well-developed and well-nourished. She is active.   HENT:   Head: Normocephalic and atraumatic.   Right Ear: Hearing and external ear normal.   Left Ear: Hearing and external ear normal.   Nose: Nose normal.   Mouth/Throat: Uvula is midline and oropharynx is clear and moist.   Eyes: Conjunctivae, EOM and lids are normal. Pupils are equal, round, and reactive to light.   Neck: Trachea normal, normal range of motion, full passive range of motion without pain and phonation normal. Neck supple.   Cardiovascular: Normal rate and normal heart sounds.  A regularly irregular rhythm present.   Pulmonary/Chest: Effort normal and breath sounds normal.   Abdominal: Soft. Normal appearance. There is no tenderness.   Neurological: She is alert and oriented to person, place, and time. No cranial nerve deficit. GCS eye " subscore is 4. GCS verbal subscore is 5. GCS motor subscore is 6.   NIH stroke scale of zero   Skin: Skin is warm, dry and intact.   Psychiatric: She has a normal mood and affect. Her speech is normal and behavior is normal. Judgment and thought content normal. Cognition and memory are normal.   Nursing note and vitals reviewed.      Procedures         ED Course  ED Course    I spoke w/ Dr Bennett and informed him of the EKG findings of 2nd degree block which he stated there was nothing emergent to do. I spoke with Dr Saucedo and Dr Cooley at [facility] who recommended transfer for stroke work-up.               MDM  Number of Diagnoses or Management Options  Arteriosclerotic cerebrovascular disease: established and worsening  AV block, 2nd degree: new and requires workup  Postural dizziness with presyncope: new and requires workup     Amount and/or Complexity of Data Reviewed  Clinical lab tests: ordered and reviewed  Tests in the radiology section of CPT®: ordered and reviewed    Risk of Complications, Morbidity, and/or Mortality  Presenting problems: high  Diagnostic procedures: high  Management options: high    Patient Progress  Patient progress: stable      Final diagnoses:   Postural dizziness with presyncope   AV block, 2nd degree   Arteriosclerotic cerebrovascular disease            Donta Odonnell MD  02/09/17 0023

## 2017-02-10 ENCOUNTER — APPOINTMENT (OUTPATIENT)
Dept: NEUROLOGY | Facility: HOSPITAL | Age: 72
End: 2017-02-10
Attending: PSYCHIATRY & NEUROLOGY

## 2017-02-10 ENCOUNTER — TELEPHONE (OUTPATIENT)
Dept: GASTROENTEROLOGY | Facility: CLINIC | Age: 72
End: 2017-02-10

## 2017-02-10 ENCOUNTER — APPOINTMENT (OUTPATIENT)
Dept: CARDIOLOGY | Facility: HOSPITAL | Age: 72
End: 2017-02-10

## 2017-02-10 DIAGNOSIS — R10.9 ABDOMINAL PAIN, UNSPECIFIED LOCATION: Primary | ICD-10-CM

## 2017-02-10 DIAGNOSIS — Z12.11 COLON CANCER SCREENING: ICD-10-CM

## 2017-02-10 PROBLEM — I95.1 ORTHOSTASIS: Status: ACTIVE | Noted: 2017-02-10

## 2017-02-10 PROBLEM — F01.50 VASCULAR DEMENTIA (HCC): Chronic | Status: ACTIVE | Noted: 2017-02-09

## 2017-02-10 PROBLEM — D50.9 IRON DEFICIENCY ANEMIA: Chronic | Status: ACTIVE | Noted: 2017-02-09

## 2017-02-10 PROBLEM — I10 HYPERTENSION: Chronic | Status: ACTIVE | Noted: 2017-02-09

## 2017-02-10 PROBLEM — D50.9 IRON DEFICIENCY ANEMIA: Status: ACTIVE | Noted: 2017-02-09

## 2017-02-10 PROBLEM — Z74.09 IMPAIRED FUNCTIONAL MOBILITY, BALANCE, GAIT, AND ENDURANCE: Status: ACTIVE | Noted: 2017-02-10

## 2017-02-10 PROBLEM — I10 ACCELERATED HYPERTENSION: Status: ACTIVE | Noted: 2017-02-10

## 2017-02-10 PROBLEM — Z72.0 TOBACCO ABUSE: Chronic | Status: ACTIVE | Noted: 2017-02-09

## 2017-02-10 PROBLEM — N18.30 CKD (CHRONIC KIDNEY DISEASE), STAGE III (HCC): Chronic | Status: ACTIVE | Noted: 2017-02-09

## 2017-02-10 PROBLEM — E78.5 HYPERLIPIDEMIA: Chronic | Status: ACTIVE | Noted: 2017-02-09

## 2017-02-10 LAB
ABO GROUP BLD: NORMAL
ALBUMIN SERPL-MCNC: 3.9 G/DL (ref 3.2–4.8)
ALBUMIN/GLOB SERPL: 1.3 G/DL (ref 1.5–2.5)
ALP SERPL-CCNC: 83 U/L (ref 25–100)
ALT SERPL W P-5'-P-CCNC: 10 U/L (ref 7–40)
ANION GAP SERPL CALCULATED.3IONS-SCNC: 4 MMOL/L (ref 3–11)
ARTICHOKE IGE QN: 71 MG/DL (ref 0–130)
AST SERPL-CCNC: 11 U/L (ref 0–33)
BASOPHILS # BLD AUTO: 0.03 10*3/MM3 (ref 0–0.2)
BASOPHILS NFR BLD AUTO: 0.5 % (ref 0–1)
BH CV ECHO MEAS - AO MAX PG (FULL): 8.6 MMHG
BH CV ECHO MEAS - AO MAX PG: 14.2 MMHG
BH CV ECHO MEAS - AO MEAN PG (FULL): 3.5 MMHG
BH CV ECHO MEAS - AO MEAN PG: 6.5 MMHG
BH CV ECHO MEAS - AO ROOT AREA (BSA CORRECTED): 1.8
BH CV ECHO MEAS - AO ROOT AREA: 9.1 CM^2
BH CV ECHO MEAS - AO ROOT DIAM: 3.4 CM
BH CV ECHO MEAS - AO V2 MAX: 188.5 CM/SEC
BH CV ECHO MEAS - AO V2 MEAN: 119.5 CM/SEC
BH CV ECHO MEAS - AO V2 VTI: 42.4 CM
BH CV ECHO MEAS - ASC AORTA: 3 CM
BH CV ECHO MEAS - BSA(HAYCOCK): 1.9 M^2
BH CV ECHO MEAS - BSA(HAYCOCK): 1.9 M^2
BH CV ECHO MEAS - BSA: 1.9 M^2
BH CV ECHO MEAS - BSA: 1.9 M^2
BH CV ECHO MEAS - BZI_BMI: 26.6 KILOGRAMS/M^2
BH CV ECHO MEAS - BZI_BMI: 26.6 KILOGRAMS/M^2
BH CV ECHO MEAS - BZI_METRIC_HEIGHT: 170.2 CM
BH CV ECHO MEAS - BZI_METRIC_HEIGHT: 170.2 CM
BH CV ECHO MEAS - BZI_METRIC_WEIGHT: 77.1 KG
BH CV ECHO MEAS - BZI_METRIC_WEIGHT: 77.1 KG
BH CV ECHO MEAS - EDV(CUBED): 65 ML
BH CV ECHO MEAS - EDV(TEICH): 70.8 ML
BH CV ECHO MEAS - EF(CUBED): 82.2 %
BH CV ECHO MEAS - EF(TEICH): 75.5 %
BH CV ECHO MEAS - ESV(CUBED): 11.5 ML
BH CV ECHO MEAS - ESV(TEICH): 17.3 ML
BH CV ECHO MEAS - FS: 43.8 %
BH CV ECHO MEAS - IVS/LVPW: 1.2
BH CV ECHO MEAS - IVSD: 1 CM
BH CV ECHO MEAS - LA DIMENSION: 3.3 CM
BH CV ECHO MEAS - LA/AO: 0.97
BH CV ECHO MEAS - LV MASS(C)D: 119 GRAMS
BH CV ECHO MEAS - LV MASS(C)DI: 63.1 GRAMS/M^2
BH CV ECHO MEAS - LV MAX PG: 5.7 MMHG
BH CV ECHO MEAS - LV MEAN PG: 3 MMHG
BH CV ECHO MEAS - LV V1 MAX: 119 CM/SEC
BH CV ECHO MEAS - LV V1 MEAN: 78.3 CM/SEC
BH CV ECHO MEAS - LV V1 VTI: 29.5 CM
BH CV ECHO MEAS - LVIDD: 4 CM
BH CV ECHO MEAS - LVIDS: 2.3 CM
BH CV ECHO MEAS - LVPWD: 0.88 CM
BH CV ECHO MEAS - MV A MAX VEL: 107 CM/SEC
BH CV ECHO MEAS - MV DEC TIME: 0.09 SEC
BH CV ECHO MEAS - MV E MAX VEL: 100 CM/SEC
BH CV ECHO MEAS - MV E/A: 0.93
BH CV ECHO MEAS - PA MAX PG: 6.1 MMHG
BH CV ECHO MEAS - PA V2 MAX: 123 CM/SEC
BH CV ECHO MEAS - RVDD: 1.7 CM
BH CV ECHO MEAS - SI(AO): 204 ML/M^2
BH CV ECHO MEAS - SI(CUBED): 28.3 ML/M^2
BH CV ECHO MEAS - SI(TEICH): 28.3 ML/M^2
BH CV ECHO MEAS - SV(AO): 385 ML
BH CV ECHO MEAS - SV(CUBED): 53.4 ML
BH CV ECHO MEAS - SV(TEICH): 53.5 ML
BH CV ECHO MEAS - TV MAX PG: 2 MMHG
BH CV ECHO MEAS - TV V2 MAX: 70.6 CM/SEC
BH CV XLRA - RV BASE: 3 CM
BH CV XLRA - RV LENGTH: 5.1 CM
BH CV XLRA - RV MID: 2.3 CM
BH CV XLRA MEAS LEFT CCA RATIO VEL: 108 CM/SEC
BH CV XLRA MEAS LEFT DIST CCA EDV: 26.4 CM/SEC
BH CV XLRA MEAS LEFT DIST CCA PSV: 108 CM/SEC
BH CV XLRA MEAS LEFT DIST ICA EDV: 22.6 CM/SEC
BH CV XLRA MEAS LEFT DIST ICA PSV: 117 CM/SEC
BH CV XLRA MEAS LEFT ICA RATIO VEL: 90.5 CM/SEC
BH CV XLRA MEAS LEFT ICA/CCA RATIO: 0.84
BH CV XLRA MEAS LEFT MID CCA EDV: 26.4 CM/SEC
BH CV XLRA MEAS LEFT MID CCA PSV: 129 CM/SEC
BH CV XLRA MEAS LEFT MID ICA EDV: 21.4 CM/SEC
BH CV XLRA MEAS LEFT MID ICA PSV: 88 CM/SEC
BH CV XLRA MEAS LEFT PROX CCA EDV: 26.4 CM/SEC
BH CV XLRA MEAS LEFT PROX CCA PSV: 127 CM/SEC
BH CV XLRA MEAS LEFT PROX ECA PSV: 200 CM/SEC
BH CV XLRA MEAS LEFT PROX ICA EDV: 22.6 CM/SEC
BH CV XLRA MEAS LEFT PROX ICA PSV: 90.5 CM/SEC
BH CV XLRA MEAS LEFT PROX SCLA PSV: 218 CM/SEC
BH CV XLRA MEAS LEFT VERTEBRAL A EDV: 14.1 CM/SEC
BH CV XLRA MEAS LEFT VERTEBRAL A PSV: 88 CM/SEC
BH CV XLRA MEAS RIGHT BULB EDV: 24.6 CM/SEC
BH CV XLRA MEAS RIGHT BULB PSV: 129 CM/SEC
BH CV XLRA MEAS RIGHT CCA RATIO VEL: 121 CM/SEC
BH CV XLRA MEAS RIGHT DIST CCA EDV: 21.6 CM/SEC
BH CV XLRA MEAS RIGHT DIST CCA PSV: 121 CM/SEC
BH CV XLRA MEAS RIGHT DIST ICA EDV: 25.5 CM/SEC
BH CV XLRA MEAS RIGHT DIST ICA PSV: 119 CM/SEC
BH CV XLRA MEAS RIGHT ICA RATIO VEL: 117 CM/SEC
BH CV XLRA MEAS RIGHT ICA/CCA RATIO: 0.97
BH CV XLRA MEAS RIGHT MID CCA EDV: 15.7 CM/SEC
BH CV XLRA MEAS RIGHT MID CCA PSV: 125 CM/SEC
BH CV XLRA MEAS RIGHT MID ICA EDV: 30.4 CM/SEC
BH CV XLRA MEAS RIGHT MID ICA PSV: 117 CM/SEC
BH CV XLRA MEAS RIGHT PROX CCA EDV: 22.5 CM/SEC
BH CV XLRA MEAS RIGHT PROX CCA PSV: 210 CM/SEC
BH CV XLRA MEAS RIGHT PROX ECA EDV: 26.7 CM/SEC
BH CV XLRA MEAS RIGHT PROX ECA PSV: 261 CM/SEC
BH CV XLRA MEAS RIGHT PROX ICA EDV: 12.8 CM/SEC
BH CV XLRA MEAS RIGHT PROX ICA PSV: 106 CM/SEC
BH CV XLRA MEAS RIGHT PROX SCLA PSV: 250 CM/SEC
BH CV XLRA MEAS RIGHT VERTEBRAL A EDV: 9.2 CM/SEC
BH CV XLRA MEAS RIGHT VERTEBRAL A PSV: 35.8 CM/SEC
BILIRUB SERPL-MCNC: 0.2 MG/DL (ref 0.3–1.2)
BLD GP AB SCN SERPL QL: NEGATIVE
BUN BLD-MCNC: 22 MG/DL (ref 9–23)
BUN/CREAT SERPL: 15.7 (ref 7–25)
CALCIUM SPEC-SCNC: 9.1 MG/DL (ref 8.7–10.4)
CHLORIDE SERPL-SCNC: 107 MMOL/L (ref 99–109)
CHOLEST SERPL-MCNC: 160 MG/DL (ref 0–200)
CO2 SERPL-SCNC: 29 MMOL/L (ref 20–31)
CREAT BLD-MCNC: 1.4 MG/DL (ref 0.6–1.3)
DEPRECATED RDW RBC AUTO: 48.5 FL (ref 37–54)
EOSINOPHIL # BLD AUTO: 0.23 10*3/MM3 (ref 0.1–0.3)
EOSINOPHIL NFR BLD AUTO: 4.2 % (ref 0–3)
ERYTHROCYTE [DISTWIDTH] IN BLOOD BY AUTOMATED COUNT: 16.3 % (ref 11.3–14.5)
FERRITIN SERPL-MCNC: 6 NG/ML (ref 10–291)
FOLATE SERPL-MCNC: >24 NG/ML (ref 3.2–20)
GASTROCULT GAST QL: NEGATIVE
GFR SERPL CREATININE-BSD FRML MDRD: 37 ML/MIN/1.73
GLOBULIN UR ELPH-MCNC: 2.9 GM/DL
GLUCOSE BLD-MCNC: 80 MG/DL (ref 70–100)
GLUCOSE BLDC GLUCOMTR-MCNC: 92 MG/DL (ref 70–130)
HBA1C MFR BLD: 5.5 % (ref 4.8–5.6)
HCT VFR BLD AUTO: 25.8 % (ref 34.5–44)
HCT VFR BLD AUTO: 26 % (ref 34.5–44)
HDLC SERPL-MCNC: 42 MG/DL (ref 40–60)
HGB BLD-MCNC: 7.4 G/DL (ref 11.5–15.5)
HGB BLD-MCNC: 7.5 G/DL (ref 11.5–15.5)
IMM GRANULOCYTES # BLD: 0.01 10*3/MM3 (ref 0–0.03)
IMM GRANULOCYTES NFR BLD: 0.2 % (ref 0–0.6)
IRON 24H UR-MRATE: 20 MCG/DL (ref 50–175)
IRON SATN MFR SERPL: 5 % (ref 15–50)
LV EF 2D ECHO EST: 70 %
LYMPHOCYTES # BLD AUTO: 1.26 10*3/MM3 (ref 0.6–4.8)
LYMPHOCYTES NFR BLD AUTO: 23 % (ref 24–44)
MCH RBC QN AUTO: 23.2 PG (ref 27–31)
MCHC RBC AUTO-ENTMCNC: 28.7 G/DL (ref 32–36)
MCV RBC AUTO: 80.9 FL (ref 80–99)
MONOCYTES # BLD AUTO: 0.53 10*3/MM3 (ref 0–1)
MONOCYTES NFR BLD AUTO: 9.7 % (ref 0–12)
NEUTROPHILS # BLD AUTO: 3.41 10*3/MM3 (ref 1.5–8.3)
NEUTROPHILS NFR BLD AUTO: 62.4 % (ref 41–71)
PLATELET # BLD AUTO: 203 10*3/MM3 (ref 150–450)
PMV BLD AUTO: 8.6 FL (ref 6–12)
POTASSIUM BLD-SCNC: 4.5 MMOL/L (ref 3.5–5.5)
PROT SERPL-MCNC: 6.8 G/DL (ref 5.7–8.2)
RBC # BLD AUTO: 3.19 10*6/MM3 (ref 3.89–5.14)
RETICS/RBC NFR AUTO: 2.05 % (ref 0.5–1.5)
RH BLD: POSITIVE
SODIUM BLD-SCNC: 140 MMOL/L (ref 132–146)
TIBC SERPL-MCNC: 428 MCG/DL (ref 250–450)
TRIGL SERPL-MCNC: 236 MG/DL (ref 0–150)
TSH SERPL DL<=0.05 MIU/L-ACNC: 3.11 MIU/ML (ref 0.35–5.35)
VIT B12 BLD-MCNC: 1105 PG/ML (ref 211–911)
WBC NRBC COR # BLD: 5.47 10*3/MM3 (ref 3.5–10.8)

## 2017-02-10 PROCEDURE — 95816 EEG AWAKE AND DROWSY: CPT | Performed by: PSYCHIATRY & NEUROLOGY

## 2017-02-10 PROCEDURE — 93306 TTE W/DOPPLER COMPLETE: CPT | Performed by: INTERNAL MEDICINE

## 2017-02-10 PROCEDURE — 83010 ASSAY OF HAPTOGLOBIN QUANT: CPT | Performed by: NURSE PRACTITIONER

## 2017-02-10 PROCEDURE — 82962 GLUCOSE BLOOD TEST: CPT

## 2017-02-10 PROCEDURE — G8996 SWALLOW CURRENT STATUS: HCPCS

## 2017-02-10 PROCEDURE — 94640 AIRWAY INHALATION TREATMENT: CPT

## 2017-02-10 PROCEDURE — G0378 HOSPITAL OBSERVATION PER HR: HCPCS

## 2017-02-10 PROCEDURE — 80053 COMPREHEN METABOLIC PANEL: CPT | Performed by: NURSE PRACTITIONER

## 2017-02-10 PROCEDURE — G8978 MOBILITY CURRENT STATUS: HCPCS

## 2017-02-10 PROCEDURE — 92610 EVALUATE SWALLOWING FUNCTION: CPT

## 2017-02-10 PROCEDURE — 93880 EXTRACRANIAL BILAT STUDY: CPT | Performed by: INTERNAL MEDICINE

## 2017-02-10 PROCEDURE — G8997 SWALLOW GOAL STATUS: HCPCS

## 2017-02-10 PROCEDURE — 97162 PT EVAL MOD COMPLEX 30 MIN: CPT

## 2017-02-10 PROCEDURE — 85018 HEMOGLOBIN: CPT | Performed by: NURSE PRACTITIONER

## 2017-02-10 PROCEDURE — 99205 OFFICE O/P NEW HI 60 MIN: CPT | Performed by: PSYCHIATRY & NEUROLOGY

## 2017-02-10 PROCEDURE — 94799 UNLISTED PULMONARY SVC/PX: CPT

## 2017-02-10 PROCEDURE — 83036 HEMOGLOBIN GLYCOSYLATED A1C: CPT | Performed by: NURSE PRACTITIONER

## 2017-02-10 PROCEDURE — 85014 HEMATOCRIT: CPT | Performed by: NURSE PRACTITIONER

## 2017-02-10 PROCEDURE — 99226 PR SBSQ OBSERVATION CARE/DAY 35 MINUTES: CPT | Performed by: FAMILY MEDICINE

## 2017-02-10 PROCEDURE — 96366 THER/PROPH/DIAG IV INF ADDON: CPT

## 2017-02-10 PROCEDURE — G8979 MOBILITY GOAL STATUS: HCPCS

## 2017-02-10 PROCEDURE — 95816 EEG AWAKE AND DROWSY: CPT

## 2017-02-10 PROCEDURE — 80061 LIPID PANEL: CPT | Performed by: NURSE PRACTITIONER

## 2017-02-10 PROCEDURE — 96365 THER/PROPH/DIAG IV INF INIT: CPT

## 2017-02-10 PROCEDURE — 93306 TTE W/DOPPLER COMPLETE: CPT

## 2017-02-10 PROCEDURE — 97165 OT EVAL LOW COMPLEX 30 MIN: CPT

## 2017-02-10 PROCEDURE — 84443 ASSAY THYROID STIM HORMONE: CPT | Performed by: NURSE PRACTITIONER

## 2017-02-10 PROCEDURE — 85025 COMPLETE CBC W/AUTO DIFF WBC: CPT | Performed by: NURSE PRACTITIONER

## 2017-02-10 PROCEDURE — G8998 SWALLOW D/C STATUS: HCPCS

## 2017-02-10 PROCEDURE — 82270 OCCULT BLOOD FECES: CPT | Performed by: NURSE PRACTITIONER

## 2017-02-10 PROCEDURE — 94760 N-INVAS EAR/PLS OXIMETRY 1: CPT

## 2017-02-10 PROCEDURE — 93880 EXTRACRANIAL BILAT STUDY: CPT

## 2017-02-10 PROCEDURE — 92523 SPEECH SOUND LANG COMPREHEN: CPT

## 2017-02-10 PROCEDURE — 25010000002 NA FERRIC GLUC CPLX PER 12.5 MG: Performed by: INTERNAL MEDICINE

## 2017-02-10 PROCEDURE — 93005 ELECTROCARDIOGRAM TRACING: CPT | Performed by: INTERNAL MEDICINE

## 2017-02-10 RX ORDER — PANTOPRAZOLE SODIUM 40 MG/1
40 TABLET, DELAYED RELEASE ORAL
Status: DISCONTINUED | OUTPATIENT
Start: 2017-02-10 | End: 2017-02-12 | Stop reason: HOSPADM

## 2017-02-10 RX ADMIN — MEMANTINE HYDROCHLORIDE 10 MG: 10 TABLET, FILM COATED ORAL at 09:32

## 2017-02-10 RX ADMIN — BUPROPION HYDROCHLORIDE 300 MG: 150 TABLET, FILM COATED, EXTENDED RELEASE ORAL at 09:33

## 2017-02-10 RX ADMIN — PANTOPRAZOLE SODIUM 40 MG: 40 TABLET, DELAYED RELEASE ORAL at 17:04

## 2017-02-10 RX ADMIN — DULOXETINE 60 MG: 60 CAPSULE, DELAYED RELEASE ORAL at 17:04

## 2017-02-10 RX ADMIN — DONEPEZIL HYDROCHLORIDE 10 MG: 10 TABLET ORAL at 09:33

## 2017-02-10 RX ADMIN — SODIUM CHLORIDE 125 MG: 9 INJECTION, SOLUTION INTRAVENOUS at 09:42

## 2017-02-10 RX ADMIN — MEMANTINE HYDROCHLORIDE 10 MG: 10 TABLET, FILM COATED ORAL at 17:04

## 2017-02-10 RX ADMIN — LEVOTHYROXINE SODIUM 25 MCG: 25 TABLET ORAL at 09:35

## 2017-02-10 RX ADMIN — AMLODIPINE BESYLATE 5 MG: 5 TABLET ORAL at 09:41

## 2017-02-10 RX ADMIN — CLOPIDOGREL 75 MG: 75 TABLET, FILM COATED ORAL at 09:41

## 2017-02-10 RX ADMIN — BUDESONIDE AND FORMOTEROL FUMARATE DIHYDRATE 2 PUFF: 80; 4.5 AEROSOL RESPIRATORY (INHALATION) at 09:29

## 2017-02-10 RX ADMIN — QUETIAPINE FUMARATE 150 MG: 100 TABLET ORAL at 20:38

## 2017-02-10 RX ADMIN — PANTOPRAZOLE SODIUM 40 MG: 40 TABLET, DELAYED RELEASE ORAL at 09:41

## 2017-02-10 RX ADMIN — BUDESONIDE AND FORMOTEROL FUMARATE DIHYDRATE 2 PUFF: 80; 4.5 AEROSOL RESPIRATORY (INHALATION) at 20:34

## 2017-02-10 RX ADMIN — TRAZODONE HYDROCHLORIDE 150 MG: 100 TABLET ORAL at 20:38

## 2017-02-10 RX ADMIN — DULOXETINE 60 MG: 60 CAPSULE, DELAYED RELEASE ORAL at 09:32

## 2017-02-10 RX ADMIN — ATORVASTATIN CALCIUM 80 MG: 40 TABLET, FILM COATED ORAL at 20:39

## 2017-02-10 RX ADMIN — ASPIRIN 325 MG ORAL TABLET 325 MG: 325 PILL ORAL at 21:05

## 2017-02-10 NOTE — PLAN OF CARE
Problem: Stroke (Ischemic) (Adult)  Goal: Signs and Symptoms of Listed Potential Problems Will be Absent or Manageable (Stroke)  Outcome: Ongoing (interventions implemented as appropriate)

## 2017-02-10 NOTE — THERAPY DISCHARGE NOTE
Acute Care - Speech Language Pathology Initial Eval/Discharge  [facility]     Patient Name: Sunshine Her  : 1945  MRN: [id number]  Today's Date: 2/10/2017  Onset of Illness/Injury or Date of Surgery Date: 17            Admit Date: 2017     Visit Dx:    ICD-10-CM ICD-9-CM   1. Impaired functional mobility, balance, gait, and endurance Z74.09 V49.89   2. Impaired mobility and ADLs Z74.09 799.89     Patient Active Problem List   Diagnosis   • Generalized weakness   • Acute on chronic renal insufficiency   • Anemia   • Symptomatic bradycardia   • Leukopenia   • Hypothyroid   • Bipolar affective disorder   • Kidney function abnormal   • COPD (chronic obstructive pulmonary disease)   • CVA (cerebral vascular accident)-Right Occipital/Temporal   • Chronic kidney disease (CKD), stage III (moderate)   • Dementia   • Vertigo   • History of stroke   • Iron deficiency anemia   • CKD (chronic kidney disease), stage III   • Vascular dementia   • Hypertension   • Hyperlipidemia   • Tobacco abuse   • Malignant hypertension   • Orthostasis   • Impaired functional mobility, balance, gait, and endurance     Past Medical History   Diagnosis Date   • Acute on chronic renal insufficiency 7/15/2016   • Anemia    • Anxiety    • Arthritis    • Arthritis    • Bipolar 1 disorder    • Bradycardia    • COPD (chronic obstructive pulmonary disease)    • Dementia    • Disease of thyroid gland    • Generalized weakness 2016   • GERD (gastroesophageal reflux disease)    • Heart disease    • Hyperlipidemia    • Hypertension    • PAD (peripheral artery disease)    • Stroke    • TIA (transient ischemic attack)    • Vertigo      Past Surgical History   Procedure Laterality Date   • Carotid endarterectomy Bilateral    • Endoscopy     • Angioplasty subclavian artery     • Renal artery bypass            SLP EVALUATION (last 72 hours)      SLP Evaluation       02/10/17 0900                Living Environment    Lives With  child(melly), adult  -LS        Living Arrangements house  -LS        Clinical Impression    SLP Diagnosis Mild-moderate memory deficits at baseline. Mild expressive language deficits at baseline.  -LS        Criteria for Skilled Therapeutic Interventions Met no problems identified which require skilled intervention  -LS        Cognitive Assessment/Intervention    Additional Documentation Cognitive Assessment Intervention (Group)  -LS        Cognitive Assessment Intervention    Behavior/Mood Observations behavior appropriate to situation, WNL/WFL  -LS        Attention WNL/WFL  -LS        Pragmatics WNL/WFL  -LS        Problem Solving mild impairment  -LS        Problem Solving Interventions Difficulty providing multiple solutions for ADLs  -LS        Communication Assessment/Intervention    Additional Documentation Auditory Comprehen Assess/Intervention (Group);Verbal Expression Assess/Intervention (Group);Motor Speech Assessment/Intervention (Group)  -LS        Auditory Comprehen Assess/Intervention    Auditory Comprehension WNL/WFL  -LS        Auditory Comprehen Assess/Intervention    Able to Identify Objects WNL/WFL;successful, field of 4  -LS        Answers Yes/No Questions WNL/WFL;successful, closed questions;successful, basic questions;successful, concrete questions;successful, personal questions  -LS        Verbal Expression Assess/Intervention    Automatic Speech WNL/WFL;successful, alphabet  -LS        Conversational Speech mild impairment  -LS        Conversational Speech Comment Difficulty w/ word finding in structured tasks likely related to changes in memory  -LS        Motor Speech Assess/Intervention    Motor Speech-Apraxia Observations no concerns  -LS        Motor Speech- Apraxia WNL/WFL  -LS        Motor Speech-Dysarthria Observations no concerns  -LS          User Key  (r) = Recorded By, (t) = Taken By, (c) = Cosigned By    Initials Name Effective Dates    LS Marie Givens MS [facility] [place]-SLP 06/22/15 -             EDUCATION  The patient has been educated in the following areas:   Communication Impairment.    SLP Recommendation and Plan  SLP Diagnosis: Mild-moderate memory deficits at baseline. Mild expressive language deficits at baseline.        Criteria for Skilled Therapeutic Interventions Met: no problems identified which require skilled intervention  Anticipated Discharge Disposition: home with assist                Plan of Care Review  Outcome Summary/Follow up Plan: Clinical swallow eval and S/L eval complete. Functional swallow; no overt s/s of asp even when pushed with 3oz swallow. No difficulty w/ mastication w/ dentures out. REC: regular diet/thin liquids. S/L eval: Pt at her baseline per daughter and pt. Mild-moderate memory deficits. Mild word finding difficulties in sructured tasks. WFL for receptive language.  Discussed benefit of OP SLP services for memory. REC: no IP SLP services, Pt may persue OP SLP services for memory.            Time Calculation:         Time Calculation- SLP       02/10/17 1532          Time Calculation- SLP    SLP Start Time 0900  -      SLP Received On 02/10/17  -        User Key  (r) = Recorded By, (t) = Taken By, (c) = Cosigned By    Initials Name Provider Type     Marie Givens MS CCC-SLP Speech and Language Pathologist          Therapy Charges for Today     Code Description Service Date Service Provider Modifiers Qty    62904135518 HC ST EVAL SPEECH AND PROD W LANG  3 2/10/2017 Marie Givens MS CCC-SLP GN 1    [id number] HC ST EVAL ORAL PHARYNG SWALLOW 3 2/10/2017 Marie Givens MS CCC-SLP GN 1                   SLP Discharge Summary  Anticipated Discharge Disposition: home with assist    Marei Givens MS CCC-SLP  2/10/2017 and Acute Care - Speech Language Pathology   Swallow Eval/Discharge [facility]   Clinical Swallow Evaluation       Patient Name: Sunshine Her  : 1945  MRN: [id number]  Today's Date: 2/10/2017  Onset of Illness/Injury or  Date of Surgery Date: 02/09/17            Admit Date: 2/9/2017    Visit Dx:    ICD-10-CM ICD-9-CM   1. Impaired functional mobility, balance, gait, and endurance Z74.09 V49.89   2. Impaired mobility and ADLs Z74.09 799.89     Patient Active Problem List   Diagnosis   • Generalized weakness   • Acute on chronic renal insufficiency   • Anemia   • Symptomatic bradycardia   • Leukopenia   • Hypothyroid   • Bipolar affective disorder   • Kidney function abnormal   • COPD (chronic obstructive pulmonary disease)   • CVA (cerebral vascular accident)-Right Occipital/Temporal   • Chronic kidney disease (CKD), stage III (moderate)   • Dementia   • Vertigo   • History of stroke   • Iron deficiency anemia   • CKD (chronic kidney disease), stage III   • Vascular dementia   • Hypertension   • Hyperlipidemia   • Tobacco abuse   • Malignant hypertension   • Orthostasis   • Impaired functional mobility, balance, gait, and endurance     Past Medical History   Diagnosis Date   • Acute on chronic renal insufficiency 7/15/2016   • Anemia    • Anxiety    • Arthritis    • Arthritis    • Bipolar 1 disorder    • Bradycardia    • COPD (chronic obstructive pulmonary disease)    • Dementia    • Disease of thyroid gland    • Generalized weakness 7/14/2016   • GERD (gastroesophageal reflux disease)    • Heart disease    • Hyperlipidemia    • Hypertension    • PAD (peripheral artery disease)    • Stroke    • TIA (transient ischemic attack)    • Vertigo      Past Surgical History   Procedure Laterality Date   • Carotid endarterectomy Bilateral    • Endoscopy     • Angioplasty subclavian artery     • Renal artery bypass            SWALLOW EVALUATION (last 72 hours)      Swallow Evaluation       02/10/17 0900                Rehab Evaluation    Document Type evaluation  -LS        Subjective Information no complaints;agree to therapy  -LS        General Information    Patient Profile Review yes  -LS        Subjective Patient Observations alert,  oriented  -LS        Pertinent History Of Current Problem Admit per stroke pathway. PMH: CVA 10/16,  HTN, dementia, CKDIII, MRI: chronic infarcts L frontal/R occipital lobe, CXR: clear  -LS        Current Diet Limitations NPO  -LS        Precautions/Limitations, Vision WFL  -LS        Precautions/Limitations, Hearing WFL  -LS        Prior Level of Function- Communication other (comment)   dementia, changes from previous CVA  -LS        Prior Level of Function- Swallowing no diet consistency restrictions  -LS        Plans/Goals Discussed With patient and family;agreed upon  -LS        Barriers to Rehab none identified  -LS        Clinical Impression    Patient's Goals For Discharge return to regular diet  -LS        Family Goals For Discharge patient able to return to PO diet  -LS        SLP Swallowing Diagnosis --   WFL  -LS        Criteria for Skilled Therapeutic Interventions Met no problems identified which require skilled intervention  -LS        FCM, Swallowing 7-->Level 7  -LS        SLP Diet Recommendation regular textures;thin liquids  -LS        SLP Rec. for Method of Medication Administration meds whole with thin liquid  -LS        Anticipated Discharge Disposition home with assist  -LS        Pain Assessment    Pain Assessment No/denies pain  -LS        Oral Motor Structure and Function    Oral Motor Anatomy and Physiology patient demonstrates anatomy and physiology that is WNL  -LS        Dentition Assessment edentulous, does not have dentures   pt has dentures  -LS        Secretion Management WNL/WFL  -LS        Mucosal Quality moist, healthy  -LS        Volitional Swallow no difficulties initiating volitional swallow  -LS        Volitional Cough no difficulties initiating volitional cough  -LS        Oral Motor Assessment Comment Mild L labial weakness, present from previous CVA  -LS        General Swallow Observations    General Swallow Observations WFL  -LS        Clinical Swallow Exam    Oral Phase  Results intact oral phase without signs of dysfunction  -LS        Pharyngeal Phase Results no signs/symptoms of pharyngeal impairment  -LS        Summary of Clinical Exam Functional swallow; no overt s/s of asp even when pushed with 3oz swallow. No difficulty w/ mastication w/ dentures out. REC: regular diet/thin liquids.  -          User Key  (r) = Recorded By, (t) = Taken By, (c) = Cosigned By    Initials Name Effective Dates    CECILIA Givens MS [facility]-SLP 06/22/15 -         EDUCATION  The patient has been educated in the following areas:   Dysphagia (Swallowing Impairment) Oral Care/Hydration.    SLP Recommendation and Plan  SLP Swallowing Diagnosis:  (WFL)  SLP Diet Recommendation: regular textures, thin liquids     SLP Rec. for Method of Medication Administration: meds whole with thin liquid        Criteria for Skilled Therapeutic Interventions Met: no problems identified which require skilled intervention  Anticipated Discharge Disposition: home with assist                   Plan of Care Review  Outcome Summary/Follow up Plan: Clinical swallow eval and S/L eval complete. Functional swallow; no overt s/s of asp even when pushed with 3oz swallow. No difficulty w/ mastication w/ dentures out. REC: regular diet/thin liquids. S/L eval: Pt at her baseline per daughter and pt. Mild-moderate memory deficits. Mild word finding difficulties in sructured tasks. WFL for receptive language.  Discussed benefit of OP SLP services for memory. REC: no IP SLP services, Pt may persue OP SLP services for memory.             Time Calculation:         Time Calculation- SLP       02/10/17 1532          Time Calculation- SLP    SLP Start Time 0900  -      SLP Received On 02/10/17  -        User Key  (r) = Recorded By, (t) = Taken By, (c) = Cosigned By    Initials Name Provider Type    CECILIA Givens MS CCC-SLP Speech and Language Pathologist          Therapy Charges for Today     Code Description Service Date Service  Provider Modifiers Qty    48906110262 HC ST EVAL SPEECH AND PROD W LANG  3 2/10/2017 Marie Givens, MS CCC-SLP GN 1    [id number] HC ST EVAL ORAL PHARYNG SWALLOW 3 2/10/2017 Marie Givens, MS CCC-SLP GN 1               SLP Discharge Summary  Anticipated Discharge Disposition: home with assist    Marie Givens, MS CCC-SLP  2/10/2017

## 2017-02-10 NOTE — PLAN OF CARE
Problem: Patient Care Overview (Adult)  Goal: Plan of Care Review  Outcome: Ongoing (interventions implemented as appropriate)    02/10/17 0909   Coping/Psychosocial Response Interventions   Plan Of Care Reviewed With patient;daughter   Outcome Evaluation   Outcome Summary/Follow up Plan Continue OT to address decreased functional mobility I, decreased UE coordination and safety. Recommned pt return home with daughter for assist and outpt therapy services.         Problem: Stroke (Ischemic) (Adult)  Goal: Signs and Symptoms of Listed Potential Problems Will be Absent or Manageable (Stroke)  Outcome: Ongoing (interventions implemented as appropriate)    02/10/17 0909   Stroke (Ischemic)   Problems Assessed (Stroke (Ischemic)/TIA) communication impairment;motor/sensory impairment;cognitive impairment;acute neurologic deterioration   Problems Present (Stroke (Ischemic)/TIA) motor/sensory impairment         Problem: Inpatient Occupational Therapy  Goal: Transfer Training Goal 1 LTG- OT  Outcome: Ongoing (interventions implemented as appropriate)    02/10/17 0909   Transfer Training OT LTG   Transfer Training OT LTG, Date Established 02/10/17   Transfer Training OT LTG, Time to Achieve 1 wk   Transfer Training OT LTG, Activity Type shower chair   Transfer Training OT LTG, [place] Level conditional independence       Goal: Safety Awareness Goal LTG- OT  Outcome: Ongoing (interventions implemented as appropriate)    02/10/17 0909   Safety Awareness OT LTG   Safety Awareness OT LTG, Date Established 02/10/17   Safety Awareness OT LTG, Time to Achieve 1 wk   Safety Awareness OT LTG, Activity Type with ADL's;good safety awareness       Goal: Coordination Goal LTG- OT  Outcome: Ongoing (interventions implemented as appropriate)    02/10/17 0909   Coordination OT LTG   Coordination OT LTG, Date Established 02/10/17   Coordination OT LTG, Time to Achieve 1 wk   Coordination OT LTG, Activity Type FM task;GM task    Coordination OT LTG, [place] Level independent       Goal: Functional Mobility Goal LTG- OT  Outcome: Ongoing (interventions implemented as appropriate)    02/10/17 0909   Functional Mobility OT LTG   Functional Mobility Goal OT LTG, Date Established 02/10/17   Functional Mobility Goal OT LTG, Time to Achieve 1 wk   Functional Mobility Goal OT LTG, [place] Level modified independent   Functional Mobility Goal OT LTG, Distance to Achieve to the bathroom

## 2017-02-10 NOTE — TELEPHONE ENCOUNTER
Dr. Guevara called and stated that she had spoken with you about patient. She stated that you had said to put patient on the schedule for an EGD and Colonoscopy. I gave patient appointment. Can you please place order for procedures and Golytely? Thank you.

## 2017-02-10 NOTE — PROGRESS NOTES
Discharge Planning Assessment  [facility]     Patient Name: Sunshine Her  MRN: [id number]  Today's Date: 2/10/2017    Admit Date: 2/9/2017          Discharge Needs Assessment       02/10/17 1121    Living Environment    Lives With child(melly), adult    Living Arrangements house    Provides Primary Care For no one, unable/limited ability to care for self    Quality Of Family Relationships supportive;helpful;involved    Able to Return to Prior Living Arrangements yes    Discharge Needs Assessment    Concerns To Be Addressed discharge planning concerns    Readmission Within The Last 30 Days no previous admission in last 30 days    Equipment Currently Used at Home cane, quad;cane, straight;shower chair;walker, rolling;other (see comments);oxygen   Oxygen 2L HS through [facility] in [place] [employer] has a rollator as well    Transportation Available car;family or friend will provide    Discharge Disposition still a patient    Discharge Contact Information if Applicable Alanis Rod, daughter, [phone]            Discharge Plan       02/10/17 1128    Case Management/Social Work Plan    Plan Home     Patient/Family In Agreement With Plan yes    Additional Comments Met with patient and her daughter in the room to initiate discharge planning. Patient lives with her daughter and son-in-law in a house in [place]. She requires help to get up the stairs but otherwise ambulates independently. She has multiple DME available for use but does not rely on them currently. Patient wears 2L oxygen at night. She has used [facility] in the past and would use them again if home health is ordered at discharge. PT notes from today recommend outpatient PT. Patient's goal is home at DC and her daughter will transport her. CM will continue to follow for DC needs.        Discharge Placement     No information found        Expected Discharge Date and Time     Expected Discharge Date Expected Discharge Time    Feb 12, 2017                Demographic Summary       02/10/17 1053    Referral Information    Referral Source admission list    Reason For Consult discharge planning    Record Reviewed history and physical;clinical discipline documentation    Contact Information    Permission Granted to Share Information With ;family/designee   daughterAlanis    Primary Care Physician Information    Name Pati Escobedo            Functional Status       02/10/17 1056    Functional Status Prior    Ambulation 0-->independent    Transferring 0-->independent    Toileting 0-->independent    Bathing 2-->assistive person    Dressing 2-->assistive person    Eating 0-->independent    Communication 0-->understands/communicates without difficulty    Swallowing 0-->swallows foods/liquids without difficulty            Psychosocial     None            Abuse/Neglect     None            Legal     None            Substance Abuse     None            Patient Forms     None          Marylin Hartman

## 2017-02-10 NOTE — PLAN OF CARE
Problem: Patient Care Overview (Adult)  Goal: Plan of Care Review    02/10/17 1527   Outcome Evaluation   Outcome Summary/Follow up Plan Clinical swallow eval and S/L eval complete. Functional swallow; no overt s/s of asp even when pushed with 3oz swallow. No difficulty w/ mastication w/ dentures out. REC: regular diet/thin liquids. S/L eval: Pt at her baseline per daughter and pt. Mild-moderate memory deficits. Mild word finding difficulties in structured tasks. WFL for receptive language. Discussed benefit of OP SLP services for memory. REC: no IP SLP services, Pt may pursue OP SLP services for memory.

## 2017-02-10 NOTE — H&P
"  [facility]  HISTORY AND PHYSICAL    Primary Care Physician: MONTANA Montez    Subjective     Chief Complaint: weakness    History of Present Illness  This is a pleasant 71 year old female with a history of ischemic CVA, HTN, Hyperlipidemia, vascular dementia, ICA disease, and CKD stage III who presents to [facility] today with increased weakness.  The patient at baseline has some generalized weakness due to her CVA from October 2016.  Today the daughter states that they were walking to her doctors appointment when she states her mother \"grabbed her mouth\" and leaned up against the wall to slide down the wall to the floor.  The patient's daughter states that her mother said she didn't feel right but then stared off for a few minutes then responded again.  She then was sent to the ER at [facility].  She denies any fever, chills, nausea, vomiting, diarrhea, black/tarry stools,  palpitations, dizziness, speech changes, vision changes, numbness, tingling or LOC.  She does however state that over the past several days she has been more tired and weak than her normal and has has some shortness of breath with exertion.  Daughter states that with her previous CVA her symptoms were somewhat similar in the fact that she had generalized weakness.  She was transferred to [facility] for higher level of care.        -last colonoscopy 3-4 years ago       Review of Systems   Constitutional: Negative for chills, diaphoresis, fatigue and fever.   Eyes: Negative.    Respiratory: Negative for cough.    Cardiovascular: Negative.    Gastrointestinal: Negative for abdominal distention, abdominal pain, blood in stool, constipation, diarrhea and nausea.   Endocrine: Negative.    Genitourinary: Negative for difficulty urinating, dysuria, flank pain and hematuria.   Musculoskeletal: Positive for gait problem.        Chronically unsteady since previous cva   Skin: Negative.  " "  Allergic/Immunologic: Negative.    Neurological: Positive for weakness and headaches. Negative for dizziness, tremors, syncope, facial asymmetry, speech difficulty, light-headedness and numbness.        Has had headaches intermittently since previous cva   Psychiatric/Behavioral: Negative.       Otherwise complete ROS reviewed and negative except as mentioned in the HPI.      Past Medical History:   Past Medical History   Diagnosis Date   • Acute on chronic renal insufficiency 7/15/2016   • Anemia    • Anxiety    • Arthritis    • Arthritis    • Bipolar 1 disorder    • Bradycardia    • COPD (chronic obstructive pulmonary disease)    • Dementia    • Disease of thyroid gland    • Generalized weakness 7/14/2016   • GERD (gastroesophageal reflux disease)    • Heart disease    • Hyperlipidemia    • Hypertension    • PAD (peripheral artery disease)    • Stroke    • TIA (transient ischemic attack)    • Vertigo        Past Surgical History:  Past Surgical History   Procedure Laterality Date   • Carotid endarterectomy Bilateral    • Endoscopy     • Angioplasty subclavian artery     • Renal artery bypass         Family History:   Family History   Problem Relation Age of Onset   • Heart disease Mother    • Diabetes Mother    • Cancer Father    • Diabetes Sister    • Cancer Brother      Social History:   Social History     Social History   • Marital status:      Spouse name: N/A   • Number of children: N/A   • Years of education: N/A     Occupational History   • Not on file.     Social History Main Topics   • Smoking status: Current Every Day Smoker     Packs/day: 1.00     Years: 40.00     Types: Cigarettes   • Smokeless tobacco: Never Used   • Alcohol use No      Comment: \"used to drink a lot\" none now  (per daughter)   • Drug use: No   • Sexual activity: Defer     Other Topics Concern   • Not on file     Social History Narrative    Currently lives in [facility] with daughter.        Medications:  Prescriptions Prior " to Admission   Medication Sig Dispense Refill Last Dose   • acetaminophen (TYLENOL) 325 MG tablet Take 2 tablets by mouth Every 4 (Four) Hours As Needed for mild pain (1-3) or fever (Temperature greater than or equal to 37 C).  0 Taking   • amLODIPine (NORVASC) 5 MG tablet Take 1 tablet by mouth Daily. 30 tablet 0 2/9/2017 at Unknown time   • aspirin 81 MG chewable tablet Chew 1 tablet Daily.   2/9/2017 at Unknown time   • atorvastatin (LIPITOR) 40 MG tablet Take 1 tablet by mouth Daily. (Patient taking differently: Take 40 mg by mouth Every Night.)   2/8/2017 at Unknown time   • buPROPion XL (WELLBUTRIN XL) 300 MG 24 hr tablet Take 1 tablet by mouth Every Morning. 30 tablet 1 2/9/2017 at Unknown time   • clopidogrel (PLAVIX) 75 MG tablet Take 1 tablet by mouth Daily. 30 tablet  2/9/2017 at Unknown time   • divalproex (DEPAKOTE) 500 MG DR tablet Take 1 tablet by mouth Every Night. 30 tablet 1 Taking   • donepezil (ARICEPT) 10 MG tablet Take 1 tablet by mouth Daily. 30 tablet 11 2/8/2017 at Unknown time   • DULoxetine (CYMBALTA) 60 MG capsule Take 1 capsule by mouth 2 (Two) Times a Day. 60 capsule 1 2/9/2017 at Unknown time   • esomeprazole (NexIUM) 40 MG capsule Take 40 mg by mouth every morning before breakfast.   2/9/2017 at Unknown time   • fluticasone-salmeterol (ADVAIR) 100-50 MCG/DOSE DISKUS Inhale 1 puff 2 (Two) Times a Day. Patients family states they are not sure this is the right mg but her pharmacy is [facility].   Past Week at Unknown time   • levothyroxine (SYNTHROID, LEVOTHROID) 25 MCG tablet Take 25 mcg by mouth daily.   2/9/2017 at Unknown time   • memantine (NAMENDA) 10 MG tablet Take 1 tablet by mouth 2 (two) times a day. 60 tablet 1 2/9/2017 at Unknown time   • nicotine (NICODERM CQ) 21 MG/24HR patch Place 1 patch on the skin Daily.   Taking   • QUEtiapine (SEROquel) 100 MG tablet Take 1.5 tablets by mouth Every Night. 45 tablet 1 Taking   • traZODone (DESYREL) 150 MG tablet Take 1 tablet by mouth  "Every Night. 30 tablet 1 Taking     Allergies:  No Known Allergies    Objective     Vital Signs:   Visit Vitals   • BP (!) 192/92 (BP Location: Left arm, Patient Position: Lying)   • Pulse 84   • Temp 98.4 °F (36.9 °C) (Oral)   • Resp 16   • Ht 67\" (170.2 cm)   • Wt 170 lb (77.1 kg)   • BMI 26.63 kg/m2     Physical Exam   Constitutional: She is oriented to person, place, and time. She appears well-developed and well-nourished. No distress.   Alert and oriented x3, pleasant cooperative.  Appears comfortable and in no acute distress. Daughter is at bedside.    HENT:   Head: Normocephalic.   Eyes: Conjunctivae and EOM are normal. Pupils are equal, round, and reactive to light. Right eye exhibits no discharge. Left eye exhibits no discharge. No scleral icterus.   Neck: Normal range of motion. Neck supple. No JVD present. No tracheal deviation present. No thyromegaly present.   Cardiovascular: Normal rate, normal heart sounds and intact distal pulses.  Exam reveals no gallop and no friction rub.    No murmur heard.  Pulmonary/Chest: Effort normal and breath sounds normal. No stridor. No respiratory distress. She has no wheezes. She has no rales. She exhibits no tenderness.   Abdominal: Soft. Bowel sounds are normal. She exhibits no distension and no mass. There is no tenderness. There is no rebound and no guarding. No hernia.   Musculoskeletal: Normal range of motion. She exhibits no edema, tenderness or deformity.   Lymphadenopathy:     She has no cervical adenopathy.   Neurological: She is alert and oriented to person, place, and time. She has normal reflexes.   No focal deficits,  equal, 2+ pulses throughout, plantar flexion and extension equal    Skin: Skin is warm and dry. No rash noted. She is not diaphoretic. No erythema. No pallor.   Psychiatric: She has a normal mood and affect. Her behavior is normal. Judgment and thought content normal.   Vitals reviewed.            Results Reviewed:    Results from last " 7 days  Lab Units 02/09/17  2125   WBC 10*3/mm3 5.85   HEMOGLOBIN g/dL 7.5*   PLATELETS 10*3/mm3 187       Results from last 7 days  Lab Units 02/09/17  1224   SODIUM mmol/L 140   POTASSIUM mmol/L 4.8   TOTAL CO2 mmol/L 28.3   CREATININE mg/dL 1.68*   GLUCOSE mg/dL 102   CALCIUM mg/dL 9.4     Imaging Results (last 24 hours)     ** No results found for the last 24 hours. **            I have personally reviewed and interpreted the radiology studies and ECG obtained at time of admission.     Assessment / Plan      Assessment & Plan  Principal Problem:    CVA (cerebral vascular accident)  Active Problems:    Normocytic anemia    Generalized weakness    Hypothyroid    Bipolar affective disorder    COPD (chronic obstructive pulmonary disease)    History of stroke    CKD (chronic kidney disease), stage III    Vascular dementia    Hypertension    Hyperlipidemia    1) Rule out CVA  -CT at OSH showed Diffuse generalized cerebral atrophy with evidence of prior  infarcts. No acute infarcts were demonstrated in the brain.  -will get bubble echo and carotid duplex in am  -consult neurology to see in am, previously seen by Dr. Guevara with past CVA  -NIH currently 3, no previous NIH documented from [facility] that I can find  -will repeat NIH daily, NPO for now failed dysphagia, will consult speech  -check FLP, TSH, and Hgb a1c in am  -MRI tonight without contrast  -already received ASA today, will increase statin to 80 mg    2) Normocytic anemia  -will check anemia panel  -currently H&H 7.5 and 26.0  -a year ago baseline H&H 9.7 and 30.0.  -will check occult stool  -continue to monitor  -type and screen, will transfuse if patient becomes increasingly symptomatic, or hgb < 7 or hct < 21    3) Generalized weakness  -per patient has gotten worse over the past few days  -etiology ? Multifactorial  -will rule out CVA, get anemia workup, check occult stool, repeat EKG  -consult PT/OT    4) Arrhythmia  -EKG from OSH appears  "to show a Second degree AV block type II  -will repeat EKG  -+/- consult cardiology in am, ? Etiology for episode that happened today     5) CKD stage III  -hold nephrotoxic medications  -baseline creatinine 1.4 - 1.8  -continue to monitor    6) Hypertension  -hold BP meds for now due to #1    7) Hyperlipidemia  -increase statin to 80 mg    8) Vascular dementia  -continue home medications    9) Bipolar affective disorder  -continue home medications    10) COPD  -non oxygen dependent  -duo-nebs prn  -continuous pulse ox    11) tobacco abuse  -cessation education provided  -nicotine patch     12) DVt prophylaxis  -teds/scds, defer anticoagulation due to #1    13) Code Status:   -full code          I discussed the patients findings and my recommendations with:patient, patient's family and primary care team.     Lizbeth Jones, APRN 02/09/17 9:51 PM      Brief Attending Note   I have seen and examined the patient, performing an independent face-to-face diagnostic evaluation at bedside in the room.    The plan of care reviewed and developed with the advanced practice clinician (APC):   Robert .  Brief Summary Statement/HPI:   70 yo with history of CVA, mood disorder and dementia present after an episode today where she grabbed her mouth, leaned against the wall then slid to the floor. Per daughter at bedside, patient has experienced increased malaise over the past several days, but denies fever, chills, cough or dysuria. CT head OSH unremarkble. Patient transferred to [facility] for further evaluation      Attending Physical Exam:  Alert, mild confusion (year \"1916\"), speech clearRRR, no murmur rub or gallop  CTAB  Abdomen soft, non tender, non distended, normal bowel sounds  No lower extremity cyanosis, clubbing or edema  Normal affect  Moves all extremities with ease  Face and smile symmetric        Brief Assessment/Plan :    Syncopal event  - unclear etiology   - while neurologic cause possible (MRI brain pending) must " also consider possible cardiac source (possible afib on initial EKG at OSH)  - anemia and underlying mood disorder, dementia and medications may also contribute    Anemia  - on Asa and plavix since CVA in Oct 2016  - profoundly iron deficient - will provide IV iron  - likely GI source, but no lorelei bleeding  - PPI daily, will need referral for endoscopy, likely as outpatient    Arrhythmia?  - possible afib on OSH EKG (but I think I can make out p waves) - repeat EKG here now and am, telemetry monitoring.    CKDIII    Dementia    Bipolar    Dyslipidemia    VTE proph: mechanical    See above for further detailed assessment and plan developed with APC which I have reviewed and/or edited.    I believe this patient requires OBSERVATION status, however if further evaluation or treatment plans warrant, status may change.  Based upon current information, I predict patient's care encounter to be less than or equal to 2 midnights.  I have seen and examined patient, performing a face-to-face diagnostic evaluation with plan of care reviewed and developed with APRN/PA    . I have addended and modified the above history of present illness, physical examination, and assessment and plan to reflect my findings and impressions.     Javon Lopez MD  02/09/17  10:51 PM

## 2017-02-10 NOTE — PROGRESS NOTES
Acute Care - Occupational Therapy Initial Evaluation  [facility]     Patient Name: Sunshine Her  : 1945  MRN: [id number]  Today's Date: 2/10/2017  Onset of Illness/Injury or Date of Surgery Date: 17  Date of Referral to OT: 17  Referring Physician: MONTANA MANJARREZ    Admit Date: 2017       ICD-10-CM ICD-9-CM   1. Impaired functional mobility, balance, gait, and endurance Z74.09 V49.89   2. Impaired mobility and ADLs Z74.09 799.89     Patient Active Problem List   Diagnosis   • Generalized weakness   • Acute on chronic renal insufficiency   • Anemia   • Symptomatic bradycardia   • Leukopenia   • Hypothyroid   • Bipolar affective disorder   • Kidney function abnormal   • COPD (chronic obstructive pulmonary disease)   • CVA (cerebral vascular accident)-Right Occipital/Temporal   • Chronic kidney disease (CKD), stage III (moderate)   • Dementia   • Vertigo   • History of stroke   • CVA (cerebral vascular accident)   • Normocytic anemia   • CKD (chronic kidney disease), stage III   • Vascular dementia   • Hypertension   • Hyperlipidemia   • Tobacco abuse     Past Medical History   Diagnosis Date   • Acute on chronic renal insufficiency 7/15/2016   • Anemia    • Anxiety    • Arthritis    • Arthritis    • Bipolar 1 disorder    • Bradycardia    • COPD (chronic obstructive pulmonary disease)    • Dementia    • Disease of thyroid gland    • Generalized weakness 2016   • GERD (gastroesophageal reflux disease)    • Heart disease    • Hyperlipidemia    • Hypertension    • PAD (peripheral artery disease)    • Stroke    • TIA (transient ischemic attack)    • Vertigo      Past Surgical History   Procedure Laterality Date   • Carotid endarterectomy Bilateral    • Endoscopy     • Angioplasty subclavian artery     • Renal artery bypass            OT ASSESSMENT FLOWSHEET (last 72 hours)      OT Evaluation       02/10/17 0820 02/10/17 0818 02/10/17 0400 02/10/17 0000 17    Rehab Evaluation     Document Type evaluation   COMPLETED CHART REVIEW [id number]. CO-RX WITH O.T.   -CD evaluation  -AN       Subjective Information agree to therapy;no complaints  -CD no complaints;agree to therapy  -AN       Symptoms Noted During/After Treatment other (see comments)   PT ORTHOSTATIC WITH DROP IN BP TO 79/65 IN STANDING.   -CD significant change in vital signs   see vitals sections for BP changes  -AN       General Information    Patient Profile Review yes  -CD yes  -AN       Onset of Illness/Injury or Date of Surgery Date 02/09/17  -CD 02/09/17  -AN       Referring Physician MONTANA MANJARREZ  -CD MONTANA Manjarrez  -TIFFANIE       General Observations PT SUPINE UPON ARRIVAL ON RA AND WITH IV. DTR PRESENT.   -CD Pt supine inbed, IV intact, daughter present  -AN       Pertinent History Of Current Problem TO OSH WITH INCREASED WEAKNESS. REPORTS INCREASED FATIGUE AND WILSON FOR SEVERAL DAYS. PT FOUND TO BE ANEMIC WITH THIS ADMIT. H&H 7.4/25.8.  PT WITH H/O CVA 10/16 WITH NO RESIDUAL DEFICITS.   -CD Pt admitted to OSH with onset of confusion,weakness, LOB, staring off , SOA with exertion. Transferred to [facility] for higher level of care  -AN       Precautions/Limitations fall precautions;other (see comments)   MONITOR VITALS, PT ORTHOSTATIC. LOW H&H.   -CD fall precautions;other (see comments)   low H&H, orthostatic BP  -AN       Prior Level of Function independent:;all household mobility;gait;transfer;dressing;min assist:;bathing;using stairs  -CD independent:;ADL's;all household mobility;bed mobility;min assist:;transfer;dependent:;home management;driving  -AN       Equipment Currently Used at Home cane, straight;walker, rolling;shower chair   WAS ONLY USING THE SHOWER CHAIR.   -CD shower chair;raised toilet   has cane and walker but does not currently use'  -AN   walker, rolling  -LL    Plans/Goals Discussed With patient and family;agreed upon  -CD patient and family;agreed upon  -AN       Risks Reviewed patient and  family:;LOB;dizziness;change in vital signs  -CD patient and family:;LOB;dizziness;increased discomfort;change in vital signs  -AN       Benefits Reviewed patient and family:;improve function;increase independence;increase strength;increase balance;increase knowledge  -CD patient and family:;improve function;increase independence;increase strength;increase balance;increase knowledge  -AN       Barriers to Rehab visual deficit   NOTED AT EVAL.   -CD previous functional deficit;cognitive status;visual deficit  -AN       Living Environment    Lives With child(melly), adult  -CD child(melly), adult   Daughter, Alanis  -AN   child(melly), adult   ALANIS ROJO, DAUGHTER  -LL    Living Arrangements house  -CD house  -AN   house  -LL    Home Accessibility bed and bath on same level;stairs to enter home  -CD tub/shower is not walk in;stairs to enter home  -AN   bed and bath on same level  -LL    Number of Stairs to Enter Home 10  -CD        Stair Railings at Home outside, present at both sides   DTR ALWAYS ASSIST WITH STAIRS FOR SAFETY.   -CD    none  -LL    Type of Financial/Environmental Concern     none  -LL    Transportation Available     car  -LL    Living Environment Comment PT HAS LIVED WITH DTR SINCE [facility] 10/16.   -CD        Clinical Impression    Date of Referral to OT  02/09/17  -AN       OT Diagnosis  Decreased ADL I  -AN       Impairments Found (describe specific impairments)  muscle performance  -AN       Patient/Family Goals Statement  Agreeable to OT, wants to return home at discharge  -AN       Criteria for Skilled Therapeutic Interventions Met  yes;treatment indicated  -AN       Rehab Potential  good, to achieve stated therapy goals  -AN       Therapy Frequency  daily  -AN       Anticipated Equipment Needs At Discharge  --   TBD  -AN       Anticipated Discharge Disposition  home with assist;home with outpatient services  -AN       Functional Level Prior    Ambulation     0-->independent  -LL    Transferring      0-->independent  -LL    Toileting     0-->independent  -LL    Bathing     2-->assistive person  -LL    Dressing     2-->assistive person  -LL    Eating     0-->independent  -LL    Communication     0-->understands/communicates without difficulty  -LL    Swallowing     0-->swallows foods/liquids without difficulty  -LL    Vital Signs    Pre Systolic BP Rehab 102  -  -AN       Pre Treatment Diastolic BP 83  -CD 83  -AN       Intra Systolic BP Rehab 79  -CD 79  -AN       Intra Treatment Diastolic BP 65   NSG NOTIFIED PT  IS ORTHOSTATIC.  -CD 65  -AN       Post Systolic BP Rehab 157   RETOOK AFTER SITTING SEVERAL MINUTES - 161/78, HR 81  -  -AN       Post Treatment Diastolic BP 70  -CD 76  -AN       Pretreatment Heart Rate (beats/min) 84  -CD 76  -AN       Intratreatment Heart Rate (beats/min) 89  -CD 89  -AN       Posttreatment Heart Rate (beats/min) 87  -CD        Pre SpO2 (%) 100  -CD 98  -AN       O2 Delivery Pre Treatment room air  -CD room air  -AN       Post SpO2 (%) 99  -CD        O2 Delivery Post Treatment room air  -CD        Pre Patient Position Sitting  -CD        Intra Patient Position Standing  -CD        Post Patient Position Sitting  -CD        Pain Assessment    Pain Assessment No/denies pain  -CD No/denies pain  -AN       Vision Assessment/Intervention    Visual Impairment  visual impairment, bilaterally;decreased tracking across midline;limited persistence with tracking  -AN       Visual Impairment Comment  Pt can look for object to L and R and identify, however, cannot track objects  -AN       Vision Comment SEE O.T. NOTE. PT WITH DIFFICULTY TRACKING L/R. BUT ABLE TO INDENTIFY NUMBERS ON MONITOR.   -CD        Cognitive Assessment/Intervention    Current Cognitive/Communication Assessment functional  -CD functional  -AN       Orientation Status oriented to;person  -CD oriented x 4  -AN       Follows Commands/Answers Questions 100% of the time;needs repetition;needs cueing  -% of the  time;needs cueing;needs increased time  -AN       Personal Safety mild impairment   CHAIR ALARM FOR SAFETY.   -CD mild impairment;decreased insight to deficits;decreased awareness, need for safety  -AN       Personal Safety Interventions elopement precautions initiated;fall prevention program maintained;gait belt;muscle strengthening facilitated;nonskid shoes/slippers when out of bed;supervised activity  -CD fall prevention program maintained  -AN       ROM (Range of Motion)    General ROM no range of motion deficits identified   B LE'S  -CD no range of motion deficits identified  -AN       MMT (Manual Muscle Testing)    General MMT Assessment no strength deficits identified   B LE'S   -CD upper extremity strength deficits identified  -AN       General MMT Assessment Detail  L Ue grossly 3+/5, R grossly 4-/5  -AN       Muscle Tone Assessment    Muscle Tone Assessment Bilateral Lower Extremities   WNL'S   -CD        Bilateral Lower Extremities Muscle Tone Assessment   mildly decreased tone  -LL mildly decreased tone  -LL mildly decreased tone  -LL    Bed Mobility, Assessment/Treatment    Bed Mobility, Assistive Device  bed rails;head of bed elevated  -AN       Bed Mob, Supine to Sit, [place] supervision required   INCREASED TIME TO COMPLETE.   -CD conditional independence  -AN       Transfer Assessment/Treatment    Transfers, Bed-Chair [place] contact guard assist  -CD        Transfers, Bed-Chair-Bed, Assist Device --   GAIT BELT FOR SAFETY.   -CD        Transfers, Sit-Stand [place] contact guard assist   FOR SAFETY- LOW H&H.   -CD contact guard assist;2 person assist required  -AN       Transfers, Stand-Sit [place] contact guard assist  -CD supervision required;verbal cues required  -AN       Transfers, Sit-Stand-Sit, Assist Device --   GAIT BELT FOR SAFETY.   -CD        Transfer, Safety Issues  other (see comments)   vc's to reach for chair  -AN       Transfer, Comment PT ABLE TO TAKE SMALL  STEPS TO RECLINER FROM BED.   -CD        Functional Mobility    Functional Mobility- Ind. Level  not tested;other (see comments)   with vital changes  -AN       Lower Body Dressing Assessment/Training    LB Dressing Assess/Train, Clothing Type  doffing:;donning:;socks  -AN       LB Dressing Assess/Train, Position  sitting  -AN       LB Dressing Assess/Train, [employer]  conditional independence  -AN       Grooming Assessment/Training    Grooming Assess/Train, Position  sitting  -AN       Grooming Assess/Train, Indepen Level  conditional independence  -AN       Grooming Assess/Train, Comment  combing hair  -AN       Motor Skills/Interventions    Additional Documentation Balance Skills Training (Group)  -CD Balance Skills Training (Group);Fine Motor Coordination Training (Group);Gross Motor Coordination Training (Group)  -AN       Balance Skills Training    Sitting-Level of Assistance Distant supervision  -CD Distant supervision  -AN       Sitting-Balance Support Feet supported  -CD Feet supported  -AN       Sitting-Balance Activities Lateral lean;Forward lean  -CD Lateral lean;Forward lean;Reaching for objects  -AN       Sitting # of Minutes 8  -CD 8  -AN       Standing-Level of Assistance Contact guard  -CD Close supervision  -AN       Static Standing Balance Support Right upper extremity supported;Left upper extremity supported  -CD No upper extremity supported  -AN       Standing-Balance Activities Weight Shift R-L  -CD Weight Shift R-L  -AN       Standing Balance # of Minutes 1  -CD 1  -AN       Gross Motor Coordination Training    Gross Motor Skill, Impairments Detail DECREASED HEEL TO SHIN L>R. DIFFICULTY WITH ALTERNATING TOE TAPS .   -CD Jethro impairments finger to nose, rapid alternating  -AN       Fine Motor Coordination Training    Detail (Fine Motor Coordination Training)  decreased speed opposition, especially L UE  -AN       Positioning and Restraints    Pre-Treatment Position in bed  -CD in bed  -AN        Post Treatment Position chair  -CD chair  -AN       In Chair notified nsg;reclined;exit alarm on;encouraged to call for assist;call light within reach;with family/caregiver;legs elevated  -CD notified nsg;reclined;call light within reach;encouraged to call for assist;exit alarm on;with family/caregiver  -AN         User Key  (r) = Recorded By, (t) = Taken By, (c) = Cosigned By    Initials Name Effective Dates    CD Makeda Salinas, PT 06/19/15 -     TIFFANIE Christianson OT 06/22/15 -     LL Xuan Dsouza RN 06/16/16 -            Occupational Therapy Education     Title: PT OT SLP Therapies (Active)     Topic: Occupational Therapy (Active)     Point: ADL training (Done)    Description: Instruct learner(s) on proper safety adaptation and remediation techniques during self care or transfers.   Instruct in proper use of assistive devices.    Learning Progress Summary    Learner Readiness Method Response Comment Documented by Status   Patient Acceptance E ANGELINE,NR Reviewed need for assist at this time with all ADL's and transfers for safety. AN 02/10/17 0908 Done   Family Acceptance E MILIND MARCUS Reviewed need for assist at this time with all ADL's and transfers for safety. AN 02/10/17 0908 Done                      User Key     Initials Effective Dates Name Provider Type Discipline    AN 06/22/15 -  Quiana Christianson OT Occupational Therapist OT                  OT Recommendation and Plan  Anticipated Equipment Needs At Discharge:  (TBD)  Anticipated Discharge Disposition: home with assist, home with outpatient services  Therapy Frequency: daily  Plan of Care Review  Plan Of Care Reviewed With: patient, daughter  Outcome Summary/Follow up Plan: Continue OT to address decreased functional mobility I, decreased UE coordination and safety. Recommned pt return home with daughter for assist and outpt therapy services.          OT Goals       02/10/17 0909          Transfer Training OT LTG    Transfer Training OT LTG, Date Established  02/10/17  -AN      Transfer Training OT LTG, Time to Achieve 1 wk  -AN      Transfer Training OT LTG, Activity Type shower chair  -AN      Transfer Training OT LTG, [place]lewellyn Level conditional independence  -AN      Safety Awareness OT LTG    Safety Awareness OT LTG, Date Established 02/10/17  -AN      Safety Awareness OT LTG, Time to Achieve 1 wk  -AN      Safety Awareness OT LTG, Activity Type with ADL's;good safety awareness  -AN      Coordination OT LTG    Coordination OT LTG, Date Established 02/10/17  -AN      Coordination OT LTG, Time to Achieve 1 wk  -AN      Coordination OT LTG, Activity Type FM task;GM task  -AN      Coordination OT LTG, Llewellyn Level independent  -AN      Functional Mobility OT LTG    Functional Mobility Goal OT LTG, Date Established 02/10/17  -AN      Functional Mobility Goal OT LTG, Time to Achieve 1 wk  -AN      Functional Mobility Goal OT LTG, Llewellyn Level modified independent  -AN      Functional Mobility Goal OT LTG, Distance to Achieve to the bathroom  -AN        User Key  (r) = Recorded By, (t) = Taken By, (c) = Cosigned By    Initials Name Provider Type    AN Quiana Christianson OT Occupational Therapist                Outcome Measures       02/10/17 0818          How much help from another is currently needed...    Putting on and taking off regular lower body clothing? 3  -AN      Bathing (including washing, rinsing, and drying) 3  -AN      Toileting (which includes using toilet bed pan or urinal) 3  -AN      Putting on and taking off regular upper body clothing 3  -AN      Taking care of personal grooming (such as brushing teeth) 4  -AN      Eating meals 4  -AN      Score 20  -AN      Modified Ava Scale    Modified [place] Scale 3 - Moderate disability.  Requiring some help, but able to walk without assistance.  -AN      Functional Assessment    Outcome Measure Options AM-PAC 6 Clicks Daily Activity (OT);Modified [place]unction  -AN        User Key  (r) = Recorded By, (t) =  Taken By, (c) = Cosigned By    Initials Name Provider Type    TIFFANIE Christianson OT Occupational Therapist          Time Calculation:   OT Start Time: 0818    Therapy Charges for Today     Code Description Service Date Service Provider Modifiers Qty    [id number] HC OT EVAL LOW COMPLEXITY 4 2/10/2017 Quiana Christianson OT GO 1               Quiana Christianson OT  2/10/2017

## 2017-02-10 NOTE — PROGRESS NOTES
[facility]  INPATIENT PROGRESS NOTE    Date of Admission: 2/9/2017  Length of Stay: 1  Primary Care Physician: MONTANA Montez    Subjective     Chief Complaint:   weakness    HPI:  Today patient feels at baseline.  BP's have stabilized on her home meds.  Noted orthostatic BP drop when going from sitting to standing with PT. Long d/w pt and daughter about POC and concern of anemia on her dual ASA/Plavix new since fall 2016 and they agree with jody for outpt EGD/Cscope which has now been scheduled in [place] with Dr. Quintanilla for 2/24/17.    Review Of Systems:   Review of Systems   Gastrointestinal: Negative for abdominal pain and blood in stool.   Neurological: Negative for weakness.         Objective      Temp:  [98.2 °F (36.8 °C)-98.6 °F (37 °C)] 98.2 °F (36.8 °C)  Heart Rate:  [71-85] 79  Resp:  [16-18] 16  BP: ()/(65-92) 137/82  Physical Exam  Temp:  [98.2 °F (36.8 °C)-98.6 °F (37 °C)] 98.2 °F (36.8 °C)  Heart Rate:  [71-85] 79  Resp:  [16-18] 16  BP: ()/(65-92) 137/82  Constitutional: no acute distress, awake, alert  HEENT: pale conjunctiva  Respiratory: Clear to auscultation bilaterally, nonlabored respirations   Cardiovascular: RRR  Gastrointestinal: Positive bowel sounds, soft, nontender, nondistended  Musculoskeletal: No bilateral ankle edema  Psychiatric: oriented x 3, appropriate affect, cooperative  Neurologic: Strength symmetric in all extremities       Results Review:    I have reviewed the labs, radiology results and diagnostic studies.      Results from last 7 days  Lab Units 02/10/17  0634   WBC 10*3/mm3 5.47   HEMOGLOBIN g/dL 7.4*   PLATELETS 10*3/mm3 203       Results from last 7 days  Lab Units 02/10/17  0634   SODIUM mmol/L 140   POTASSIUM mmol/L 4.5   TOTAL CO2 mmol/L 29.0   CREATININE mg/dL 1.40*   GLUCOSE mg/dL 80       Culture Data:   URINE CULTURE   Date Value Ref Range Status   02/09/2017 Normal Urogenital Ginger  Preliminary      Radiology Data:  reviewed    I have reviewed the medications.    Assessment/Plan     Assessment/Problem List  Active Problems:    Generalized weakness    Hypothyroid    Bipolar affective disorder    COPD (chronic obstructive pulmonary disease)    History of stroke    Iron deficiency anemia    CKD (chronic kidney disease), stage III    Vascular dementia    Hypertension    Hyperlipidemia    Tobacco abuse    Malignant hypertension    Orthostasis           Plan    - episode at time of admission was probably accelerated HTN related  - EEG, ECHO and carotids pending for full eval  - pt very orthostatic, have ordered qshift orthostatic BP's and advised to be gentle going from positional changes.  Hesitant to add midodrine unless absolutely necessary since may interfere with essential HTN control.  - very anemic and Fe deficient compared to October 2016 labs, receiving few doses IV FeSO4 while here and at d/c will need BID FeSO4 tabs  - Last Cscope ~4 yrs ago in [facility], have called and set her up for outpt EGD and Cscope with Dr. Quintanilla at [facility] for 2/24/17 (his office will call her 2 days prior with instructions and call in the bowel prep)  - Hx of prior CVA in Oct 2016 and has been on dual ASA/Plavix since then but will plan to HOLD PLAVIX at d/c (would continue with ASA only for now) until after EGD/Cscope and PCP can restart depending on those findings  - pt has been on daily omeprazole as outpt but at d/c would send home with Rx for BID Protonix PO for one month, then depending on scope findings further Rx's can be guided by Dr. Quintanilla (GI)   - placed order for [facility] to call today and make appt for pt to see PCP first week in March so can review EGD/Cscope and hopefully restart Plavix if indicated    :  Discharge Planning: I expect patient to be discharged home likely tomorrow    Krista Guevara MD   02/10/17   1:57 PM    Please note that portions of this note may have been completed with a voice recognition program.  Efforts were made to edit the dictations, but occasionally words are mistranscribed.

## 2017-02-10 NOTE — PROGRESS NOTES
Acute Care - Physical Therapy Initial Evaluation  [facility]     Patient Name: Sunshine Her  : 1945  MRN: [id number]  Today's Date: 2/10/2017   Onset of Illness/Injury or Date of Surgery Date: 17  Date of Referral to PT: 17  Referring Physician: MONTANA MANJARREZ      Admit Date: 2017     Visit Dx:    ICD-10-CM ICD-9-CM   1. Impaired functional mobility, balance, gait, and endurance Z74.09 V49.89   2. Impaired mobility and ADLs Z74.09 799.89     Patient Active Problem List   Diagnosis   • Generalized weakness   • Acute on chronic renal insufficiency   • Anemia   • Symptomatic bradycardia   • Leukopenia   • Hypothyroid   • Bipolar affective disorder   • Kidney function abnormal   • COPD (chronic obstructive pulmonary disease)   • CVA (cerebral vascular accident)-Right Occipital/Temporal   • Chronic kidney disease (CKD), stage III (moderate)   • Dementia   • Vertigo   • History of stroke   • CVA (cerebral vascular accident)   • Normocytic anemia   • CKD (chronic kidney disease), stage III   • Vascular dementia   • Hypertension   • Hyperlipidemia   • Tobacco abuse     Past Medical History   Diagnosis Date   • Acute on chronic renal insufficiency 7/15/2016   • Anemia    • Anxiety    • Arthritis    • Arthritis    • Bipolar 1 disorder    • Bradycardia    • COPD (chronic obstructive pulmonary disease)    • Dementia    • Disease of thyroid gland    • Generalized weakness 2016   • GERD (gastroesophageal reflux disease)    • Heart disease    • Hyperlipidemia    • Hypertension    • PAD (peripheral artery disease)    • Stroke    • TIA (transient ischemic attack)    • Vertigo      Past Surgical History   Procedure Laterality Date   • Carotid endarterectomy Bilateral    • Endoscopy     • Angioplasty subclavian artery     • Renal artery bypass            PT ASSESSMENT (last 72 hours)      PT Evaluation       02/10/17 0820 02/10/17 0818    Rehab Evaluation    Document Type evaluation   COMPLETED CHART  REVIEW [id number]. CO-RX WITH O.T.   -CD evaluation  -AN    Subjective Information agree to therapy;no complaints  -CD no complaints;agree to therapy  -AN    Symptoms Noted During/After Treatment other (see comments)   PT ORTHOSTATIC WITH DROP IN BP TO 79/65 IN STANDING.   -CD significant change in vital signs   see vitals sections for BP changes  -AN    General Information    Patient Profile Review yes  -CD yes  -AN    Onset of Illness/Injury or Date of Surgery Date 02/09/17  -CD 02/09/17  -AN    Referring Physician MONTANA MANJARREZ  -CD MONTANA Manjarrez  -TIFFANIE    General Observations PT SUPINE UPON ARRIVAL ON RA AND WITH IV. DTR PRESENT.   -CD Pt supine inbed, IV intact, daughter present  -AN    Pertinent History Of Current Problem TO OSH WITH INCREASED WEAKNESS. REPORTS INCREASED FATIGUE AND WILSON FOR SEVERAL DAYS. PT FOUND TO BE ANEMIC WITH THIS ADMIT. H&H 7.4/25.8.  PT WITH H/O CVA 10/16 WITH NO RESIDUAL DEFICITS.   -CD Pt admitted to OSH with onset of confusion,weakness, LOB, staring off , SOA with exertion. Transferred to [facility] for higher level of care  -AN    Precautions/Limitations fall precautions;other (see comments)   MONITOR VITALS, PT ORTHOSTATIC. LOW H&H.   -CD fall precautions;other (see comments)   low H&H, orthostatic BP  -AN    Prior Level of Function independent:;all household mobility;gait;transfer;dressing;min assist:;bathing;using stairs  -CD independent:;ADL's;all household mobility;bed mobility;min assist:;transfer;dependent:;home management;driving  -AN    Equipment Currently Used at Home cane, straight;walker, rolling;shower chair   WAS ONLY USING THE SHOWER CHAIR.   -CD shower chair;raised toilet   has cane and walker but does not currently use'  -AN    Plans/Goals Discussed With patient and family;agreed upon  -CD patient and family;agreed upon  -AN    Risks Reviewed patient and family:;LOB;dizziness;change in vital signs  -CD patient and family:;LOB;dizziness;increased discomfort;change in vital  signs  -AN    Benefits Reviewed patient and family:;improve function;increase independence;increase strength;increase balance;increase knowledge  -CD patient and family:;improve function;increase independence;increase strength;increase balance;increase knowledge  -AN    Barriers to Rehab visual deficit   NOTED AT EVAL.   -CD previous functional deficit;cognitive status;visual deficit  -AN    Living Environment    Lives With child(melly), adult  -CD child(melly), adult   Daughter, Alanis  -AN    Living Arrangements house  -CD house  -AN    Home Accessibility bed and bath on same level;stairs to enter home  -CD tub/shower is not walk in;stairs to enter home  -AN    Number of Stairs to Enter Home 10  -CD     Stair Railings at Home outside, present at both sides   DTR ALWAYS ASSIST WITH STAIRS FOR SAFETY.   -CD     Living Environment Comment PT HAS LIVED WITH DTR SINCE [facility] 10/16.   -CD     Clinical Impression    Date of Referral to PT 02/09/17  -CD     PT Diagnosis IMPAIRED FUNCTIONAL MOBILITY.   -CD     Patient/Family Goals Statement DID NOT STATE. PLANS TO RETURN HOME WITH DTR.   -CD     Criteria for Skilled Therapeutic Interventions Met yes  -CD     Rehab Potential good, to achieve stated therapy goals  -CD     Vital Signs    Pre Systolic BP Rehab 102  -  -AN    Pre Treatment Diastolic BP 83  -CD 83  -AN    Intra Systolic BP Rehab 79  -CD 79  -AN    Intra Treatment Diastolic BP 65   NSG NOTIFIED PT  IS ORTHOSTATIC.  -CD 65  -AN    Post Systolic BP Rehab 157   RETOOK AFTER SITTING SEVERAL MINUTES - 161/78, HR 81  -  -AN    Post Treatment Diastolic BP 70  -CD 76  -AN    Pretreatment Heart Rate (beats/min) 84  -CD 76  -AN    Intratreatment Heart Rate (beats/min) 89  -CD 89  -AN    Posttreatment Heart Rate (beats/min) 87  -CD     Pre SpO2 (%) 100  -CD 98  -AN    O2 Delivery Pre Treatment room air  -CD room air  -AN    Post SpO2 (%) 99  -CD     O2 Delivery Post Treatment room air  -CD     Pre Patient Position  Sitting  -CD     Intra Patient Position Standing  -CD     Post Patient Position Sitting  -CD     Pain Assessment    Pain Assessment No/denies pain  -CD No/denies pain  -AN    Vision Assessment/Intervention    Visual Impairment  visual impairment, bilaterally;decreased tracking across midline;limited persistence with tracking  -AN    Visual Impairment Comment  Pt can look for object to L and R and identify, however, cannot track objects  -AN    Vision Comment SEE O.T. NOTE. PT WITH DIFFICULTY TRACKING L/R. BUT ABLE TO INDENTIFY NUMBERS ON MONITOR.   -CD     Cognitive Assessment/Intervention    Current Cognitive/Communication Assessment functional  -CD functional  -AN    Orientation Status oriented to;person  -CD oriented x 4  -AN    Follows Commands/Answers Questions 100% of the time;needs repetition;needs cueing  -% of the time;needs cueing;needs increased time  -AN    Personal Safety mild impairment   CHAIR ALARM FOR SAFETY.   -CD mild impairment;decreased insight to deficits;decreased awareness, need for safety  -AN    Personal Safety Interventions elopement precautions initiated;fall prevention program maintained;gait belt;muscle strengthening facilitated;nonskid shoes/slippers when out of bed;supervised activity  -CD fall prevention program maintained  -AN    ROM (Range of Motion)    General ROM no range of motion deficits identified   B LE'S  -CD no range of motion deficits identified  -AN    MMT (Manual Muscle Testing)    General MMT Assessment no strength deficits identified   B LE'S   -CD upper extremity strength deficits identified  -AN    General MMT Assessment Detail  L Ue grossly 3+/5, R grossly 4-/5  -AN    Muscle Tone Assessment    Muscle Tone Assessment Bilateral Lower Extremities   WNL'S   -CD     Bed Mobility, Assessment/Treatment    Bed Mobility, Assistive Device  bed rails;head of bed elevated  -AN    Bed Mob, Supine to Sit, [place] supervision required   INCREASED TIME TO COMPLETE.    -CD conditional independence  -AN    Transfer Assessment/Treatment    Transfers, Bed-Chair [place] contact guard assist  -CD     Transfers, Bed-Chair-Bed, Assist Device --   GAIT BELT FOR SAFETY.   -CD     Transfers, Sit-Stand [place] contact guard assist   FOR SAFETY- LOW H&H.   -CD contact guard assist;2 person assist required  -AN    Transfers, Stand-Sit [place] contact guard assist  -CD supervision required;verbal cues required  -AN    Transfers, Sit-Stand-Sit, Assist Device --   GAIT BELT FOR SAFETY.   -CD     Transfer, Safety Issues  other (see comments)   vc's to reach for chair  -AN    Transfer, Comment PT ABLE TO TAKE SMALL STEPS TO RECLINER FROM BED.   -CD     Gait Assessment/Treatment    Gait, Comment DEFERRED DUE TO ORTHOSTATIC BP. PT HAS LOW H&H  -CD     Motor Skills/Interventions    Additional Documentation Balance Skills Training (Group)  -CD Balance Skills Training (Group);Fine Motor Coordination Training (Group);Gross Motor Coordination Training (Group)  -AN    Balance Skills Training    Sitting-Level of Assistance Distant supervision  -CD Distant supervision  -AN    Sitting-Balance Support Feet supported  -CD Feet supported  -AN    Sitting-Balance Activities Lateral lean;Forward lean  -CD Lateral lean;Forward lean;Reaching for objects  -AN    Sitting # of Minutes 8  -CD 8  -AN    Standing-Level of Assistance Contact guard  -CD Close supervision  -AN    Static Standing Balance Support Right upper extremity supported;Left upper extremity supported  -CD No upper extremity supported  -AN    Standing-Balance Activities Weight Shift R-L  -CD Weight Shift R-L  -AN    Standing Balance # of Minutes 1  -CD 1  -AN    Fine Motor Coordination Training    Detail (Fine Motor Coordination Training)  decreased speed opposition, especially L UE  -AN    Gross Motor Coordination Training    Gross Motor Skill, Impairments Detail DECREASED HEEL TO SHIN L>R. DIFFICULTY WITH ALTERNATING TOE TAPS .   -CD Jethro  impairments finger to nose, rapid alternating  -AN    Positioning and Restraints    Pre-Treatment Position in bed  -CD in bed  -AN    Post Treatment Position chair  -CD chair  -AN    In Chair notified nsg;reclined;exit alarm on;encouraged to call for assist;call light within reach;with family/caregiver;legs elevated  -CD notified nsg;reclined;call light within reach;encouraged to call for assist;exit alarm on;with family/caregiver  -AN      02/10/17 0400 02/10/17 0000    Muscle Tone Assessment    Bilateral Lower Extremities Muscle Tone Assessment mildly decreased tone  -LL mildly decreased tone  -LL      02/09/17 2000       General Information    Equipment Currently Used at Home walker, rolling  -LL     Living Environment    Lives With child(melly), adult   CARON ROJO, DAUGHTER  -LL     Living Arrangements house  -LL     Home Accessibility bed and bath on same level  -LL     Stair Railings at Home none  -LL     Type of Financial/Environmental Concern none  -LL     Transportation Available car  -LL     Muscle Tone Assessment    Bilateral Lower Extremities Muscle Tone Assessment mildly decreased tone  -LL       User Key  (r) = Recorded By, (t) = Taken By, (c) = Cosigned By    Initials Name Provider Type    ТАТЬЯНА Salinas, PT Physical Therapist    TIFFANIE Christianson, OT Occupational Therapist    LL Xuan Dsouza, RN Registered Nurse          Physical Therapy Education     Title: PT OT SLP Therapies (Active)     Topic: Physical Therapy (Active)     Point: Mobility training (Active)    Learning Progress Summary    Learner Readiness Method Response Comment Documented by Status   Patient Acceptance E NR BENEFITS OF OOB MOBILITY, SAFETY WITH MOBILITY, PROGRESSION OF POC, CD 02/10/17 0913 Active   Family Acceptance E NR BENEFITS OF OOB MOBILITY, SAFETY WITH MOBILITY, PROGRESSION OF POC, CD 02/10/17 0913 Active               Point: Home exercise program (Active)    Learning Progress Summary    Learner Readiness Method  Response Comment Documented by Status   Patient Acceptance E NR BENEFITS OF OOB MOBILITY, SAFETY WITH MOBILITY, PROGRESSION OF POC, CD 02/10/17 0913 Active   Family Acceptance E NR BENEFITS OF OOB MOBILITY, SAFETY WITH MOBILITY, PROGRESSION OF POC, CD 02/10/17 0913 Active               Point: Body mechanics (Active)    Learning Progress Summary    Learner Readiness Method Response Comment Documented by Status   Patient Acceptance E NR BENEFITS OF OOB MOBILITY, SAFETY WITH MOBILITY, PROGRESSION OF POC, CD 02/10/17 0913 Active   Family Acceptance E NR BENEFITS OF OOB MOBILITY, SAFETY WITH MOBILITY, PROGRESSION OF POC, CD 02/10/17 0913 Active               Point: Precautions (Active)    Learning Progress Summary    Learner Readiness Method Response Comment Documented by Status   Patient Acceptance E NR BENEFITS OF OOB MOBILITY, SAFETY WITH MOBILITY, PROGRESSION OF POC, CD 02/10/17 0913 Active   Family Acceptance E NR BENEFITS OF OOB MOBILITY, SAFETY WITH MOBILITY, PROGRESSION OF POC, CD 02/10/17 0913 Active                      User Key     Initials Effective Dates Name Provider Type Discipline     06/19/15 -  Makeda Salinas, PT Physical Therapist PT                PT Recommendation and Plan  Anticipated Discharge Disposition: home with assist, home with outpatient services  Planned Therapy Interventions: balance training, bed mobility training, gait training, patient/family education, transfer training, stair training, strengthening  PT Frequency: daily  Plan of Care Review  Plan Of Care Reviewed With: patient, daughter  Outcome Summary/Follow up Plan: PT PRESENTS WITH DECLINE IN FUNCTIONAL MOBILITY. PT ORTHOSTATIC DURING EVAL WITH DROP IN BP TO 79/65. NO C/O DIZZINESS BUT LIMITED BY FATIGUE. DEMONSTRATED DECREASED COORDINATION SITTING EOB. WILL BENEFIT FROM INPT P.T. FOR RETURN TO PLOF. RECOMMEND HOME WITH DTR AND OPPT.           IP PT Goals       02/10/17 0914          Bed Mobility PT LTG    Bed Mobility PT LTG,  Time to Achieve 2 wks  -CD      Bed Mobility PT LTG, Activity Type all bed mobility  -CD      Bed Mobility PT LTG, [place] Level independent  -CD      Transfer Training PT LTG    Transfer Training PT LTG, Time to Achieve 2 wks  -CD      Transfer Training PT LTG, Activity Type all transfers  -CD      Transfer Training PT LTG, [place] Level independent  -CD      Transfer Training PT LTG, Assist Device --   WITH LEAST RESTRICTIVE DEVICE.   -CD      Gait Training PT LTG    Gait Training Goal PT LTG, Time to Achieve 2 wks  -CD      Gait Training Goal PT LTG, [place] Level independent  -CD      Gait Training Goal PT LTG, Assist Device --   WITH LEAST RESTRIVE DEVICE  -CD      Gait Training Goal PT LTG, Distance to Achieve 500  -CD        User Key  (r) = Recorded By, (t) = Taken By, (c) = Cosigned By    Initials Name Provider Type    ТАТЬЯНА Salinas, PT Physical Therapist                Outcome Measures       02/10/17 0820 02/10/17 0818       How much help from another person do you currently need...    Turning from your back to your side while in flat bed without using bedrails? 4  -CD      Moving from lying on back to sitting on the side of a flat bed without bedrails? 3  -CD      Moving to and from a bed to a chair (including a wheelchair)? 3  -CD      Standing up from a chair using your arms (e.g., wheelchair, bedside chair)? 3  -CD      Climbing 3-5 steps with a railing? 3  -CD      To walk in hospital room? 3  -CD      AM-PAC 6 Clicks Score 19  -CD      How much help from another is currently needed...    Putting on and taking off regular lower body clothing?  3  -AN     Bathing (including washing, rinsing, and drying)  3  -AN     Toileting (which includes using toilet bed pan or urinal)  3  -AN     Putting on and taking off regular upper body clothing  3  -AN     Taking care of personal grooming (such as brushing teeth)  4  -AN     Eating meals  4  -AN     Score  20  -AN     Modified [place] Scale     Modified [place] Scale 3 - Moderate disability.  Requiring some help, but able to walk without assistance.  -CD 3 - Moderate disability.  Requiring some help, but able to walk without assistance.  -AN     Functional Assessment    Outcome Measure Options AM-PAC 6 Clicks Basic Mobility (PT);Modified [place]  -CD AM-PAC 6 Clicks Daily Activity (OT);Modified [place]  -AN       User Key  (r) = Recorded By, (t) = Taken By, (c) = Cosigned By    Initials Name Provider Type    ТАТЬЯНА Salinas, PT Physical Therapist    TIFFANIE Christianson, OT Occupational Therapist           Time Calculation:         PT Charges       02/10/17 0920          Time Calculation    PT Received On 02/10/17  -      PT Goal Re-Cert Due Date 02/20/17  -        User Key  (r) = Recorded By, (t) = Taken By, (c) = Cosigned By    Initials Name Provider Type    ТАТЬЯНА Salinas, PT Physical Therapist          Therapy Charges for Today     Code Description Service Date Service Provider Modifiers Qty    [id number] HC PT EVAL MOD COMPLEXITY 4 2/10/2017 Makeda Salinas, PT GP 1          PT G-Codes  Outcome Measure Options: AM-PAC 6 Clicks Basic Mobility (PT), Ellen Salinas, PT  2/10/2017

## 2017-02-10 NOTE — CONSULTS
Patient does not meet diabetes education order criteria of see if A1C >7.5%. ANGELA Her's A1C was 5.4%, therefore patient was not seen for diabetes education at this time.  Please re consult as needed.

## 2017-02-10 NOTE — PLAN OF CARE
Problem: Patient Care Overview (Adult)  Goal: Plan of Care Review  Outcome: Ongoing (interventions implemented as appropriate)    02/10/17 0914   Coping/Psychosocial Response Interventions   Plan Of Care Reviewed With patient;daughter   Outcome Evaluation   Outcome Summary/Follow up Plan PT PRESENTS WITH DECLINE IN FUNCTIONAL MOBILITY. PT ORTHOSTATIC DURING EVAL WITH DROP IN BP TO 79/65. NO C/O DIZZINESS BUT LIMITED BY FATIGUE. DEMONSTRATED DECREASED COORDINATION SITTING EOB. WILL BENEFIT FROM INPT P.T. FOR RETURN TO PLOF. RECOMMEND HOME WITH DTR AND OPPT.          Problem: Stroke (Ischemic) (Adult)  Goal: Signs and Symptoms of Listed Potential Problems Will be Absent or Manageable (Stroke)  Outcome: Ongoing (interventions implemented as appropriate)    02/10/17 0914   Stroke (Ischemic)   Problems Assessed (Stroke (Ischemic)/TIA) cognitive impairment;communication impairment;motor/sensory impairment   Problems Present (Stroke (Ischemic)/TIA) motor/sensory impairment         Problem: Inpatient Physical Therapy  Goal: Bed Mobility Goal LTG- PT  Outcome: Ongoing (interventions implemented as appropriate)    02/10/17 0914   Bed Mobility PT LTG   Bed Mobility PT LTG, Time to Achieve 2 wks   Bed Mobility PT LTG, Activity Type all bed mobility   Bed Mobility PT LTG, [place] Level independent       Goal: Transfer Training Goal 1 LTG- PT  Outcome: Ongoing (interventions implemented as appropriate)    02/10/17 0914   Transfer Training PT LTG   Transfer Training PT LTG, Time to Achieve 2 wks   Transfer Training PT LTG, Activity Type all transfers   Transfer Training PT LTG, [place] Level independent   Transfer Training PT LTG, Assist Device (WITH LEAST RESTRICTIVE DEVICE. )       Goal: Gait Training Goal LTG- PT  Outcome: Ongoing (interventions implemented as appropriate)    02/10/17 0914   Gait Training PT LTG   Gait Training Goal PT LTG, Time to Achieve 2 wks   Gait Training Goal PT LTG, [place] Level  independent   Gait Training Goal PT LTG, Assist Device (WITH LEAST RESTRIVE DEVICE)   Gait Training Goal PT LTG, Distance to Achieve 500

## 2017-02-10 NOTE — CONSULTS
Neurology    Referring provider:   Pati Escobedo, APRN  [address] [facility] [address]  [place] [address]    Reason for Consultation: Weakness    Chief complaint: Loss of consciousness    History of present illness:  This 71-year-old woman is seen at the request of Dr. Guevara for acute episode of weakness yesterday where she abruptly went to the ground and was confused momentarily recovering quickly.    She is had the stroke seen here in October.    Yesterday she had blood pressures as high as 192/92 on admission.    More problematic as a cheese found to have a hematocrit of 25 which was not present on her previous admission.  In fact her blood hematocrit is gone from 37-25% since December 2015.    He denies focal weakness of any kind at this point.    Review of Systems: All systems reviewed and other than the history of present illness or negative    Home meds:   Prescriptions Prior to Admission   Medication Sig Dispense Refill Last Dose   • acetaminophen (TYLENOL) 325 MG tablet Take 2 tablets by mouth Every 4 (Four) Hours As Needed for mild pain (1-3) or fever (Temperature greater than or equal to 37 C).  0 Taking   • amLODIPine (NORVASC) 5 MG tablet Take 1 tablet by mouth Daily. 30 tablet 0 2/9/2017 at Unknown time   • aspirin 81 MG chewable tablet Chew 1 tablet Daily.   2/9/2017 at Unknown time   • atorvastatin (LIPITOR) 40 MG tablet Take 1 tablet by mouth Daily. (Patient taking differently: Take 40 mg by mouth Every Night.)   2/8/2017 at Unknown time   • buPROPion XL (WELLBUTRIN XL) 300 MG 24 hr tablet Take 1 tablet by mouth Every Morning. 30 tablet 1 2/9/2017 at Unknown time   • clopidogrel (PLAVIX) 75 MG tablet Take 1 tablet by mouth Daily. 30 tablet  2/9/2017 at Unknown time   • donepezil (ARICEPT) 10 MG tablet Take 1 tablet by mouth Daily. 30 tablet 11 2/8/2017 at Unknown time   • DULoxetine (CYMBALTA) 60 MG capsule Take 1 capsule by mouth 2 (Two) Times a Day. 60 capsule 1 2/9/2017 at Unknown time   •  "esomeprazole (NexIUM) 40 MG capsule Take 40 mg by mouth every morning before breakfast.   2/9/2017 at Unknown time   • fluticasone-salmeterol (ADVAIR) 100-50 MCG/DOSE DISKUS Inhale 1 puff 2 (Two) Times a Day. Patients family states they are not sure this is the right mg but her pharmacy is [facility].   Past Week at Unknown time   • levothyroxine (SYNTHROID, LEVOTHROID) 25 MCG tablet Take 25 mcg by mouth daily.   2/9/2017 at Unknown time   • memantine (NAMENDA) 10 MG tablet Take 1 tablet by mouth 2 (two) times a day. 60 tablet 1 2/9/2017 at Unknown time   • QUEtiapine (SEROquel) 100 MG tablet Take 1.5 tablets by mouth Every Night. 45 tablet 1 Taking   • traZODone (DESYREL) 150 MG tablet Take 1 tablet by mouth Every Night. 30 tablet 1 Taking       History  Past Medical History   Diagnosis Date   • Acute on chronic renal insufficiency 7/15/2016   • Anemia    • Anxiety    • Arthritis    • Arthritis    • Bipolar 1 disorder    • Bradycardia    • COPD (chronic obstructive pulmonary disease)    • Dementia    • Disease of thyroid gland    • Generalized weakness 7/14/2016   • GERD (gastroesophageal reflux disease)    • Heart disease    • Hyperlipidemia    • Hypertension    • PAD (peripheral artery disease)    • Stroke    • TIA (transient ischemic attack)    • Vertigo    ,   Past Surgical History   Procedure Laterality Date   • Carotid endarterectomy Bilateral    • Endoscopy     • Angioplasty subclavian artery     • Renal artery bypass     ,   Family History   Problem Relation Age of Onset   • Heart disease Mother    • Diabetes Mother    • Cancer Father    • Diabetes Sister    • Cancer Brother    ,   Social History   Substance Use Topics   • Smoking status: Current Every Day Smoker     Packs/day: 1.00     Years: 40.00     Types: Cigarettes   • Smokeless tobacco: Never Used   • Alcohol use No      Comment: \"used to drink a lot\" none now  (per daughter)    and Allergies:  Review of patient's allergies indicates no known " "allergies.,    Vital Signs   Blood pressure 137/82, pulse 74, temperature 98.2 °F (36.8 °C), temperature source Oral, resp. rate 16, height 67\" (170.2 cm), weight 170 lb (77.1 kg), SpO2 95 %.  Body mass index is 26.63 kg/(m^2).    Physical Exam:   General: No acute distress              Neck: No bruits              Cor: Regular rhythm              Extr: No edema              Skin: Warm and dry               Neuro: Alert and oriented.  Normal memory attention and concentration.    Speech is articulate and normal.    Cranial nerves II through XII are normal.    Reflexes are 2+ and equal bilaterally.    Motor testing shows symmetrical strength and tone in all muscle groups.    Sensory testing is normal.        Results Review: MRI of the brain is personally reviewed and shows no evidence of an acute stroke.    She has several chronic infarctions.        Labs: Reviewed     Lab Results (last 72 hours)     Procedure Component Value Units Date/Time    CBC & Differential [[id number]] Collected:  02/09/17 2125    Specimen:  Blood Updated:  02/09/17 2136    Narrative:       The following orders were created for panel order CBC & Differential.  Procedure                               Abnormality         Status                     ---------                               -----------         ------                     CBC Auto Differential[[id number]]         Abnormal            Final result                 Please view results for these tests on the individual orders.    CBC Auto Differential [[id number]]  (Abnormal) Collected:  02/09/17 2125    Specimen:  Blood Updated:  02/09/17 2136     WBC 5.85 10*3/mm3      RBC 3.25 (L) 10*6/mm3      Hemoglobin 7.5 (L) g/dL      Hematocrit 26.0 (L) %      MCV 80.0 fL      MCH 23.1 (L) pg      MCHC 28.8 (L) g/dL      RDW 15.9 (H) %      RDW-SD 47.3 fl      MPV 8.1 fL      Platelets 187 10*3/mm3      Neutrophil % 59.9 %      Lymphocyte % 25.3 %      Monocyte % 9.2 %      Eosinophil % 4.6 (H) %      " Basophil % 0.5 %      Immature Grans % 0.5 %      Neutrophils, Absolute 3.50 10*3/mm3      Lymphocytes, Absolute 1.48 10*3/mm3      Monocytes, Absolute 0.54 10*3/mm3      Eosinophils, Absolute 0.27 10*3/mm3      Basophils, Absolute 0.03 10*3/mm3      Immature Grans, Absolute 0.03 10*3/mm3     BNP [[id number]]  (Normal) Collected:  02/09/17 2125    Specimen:  Blood Updated:  02/09/17 2159     BNP 50.0 pg/mL     Troponin [[id number]]  (Normal) Collected:  02/09/17 2125    Specimen:  Blood Updated:  02/09/17 2200     Troponin I <0.006 ng/mL     Narrative:       Ultra Troponin I Reference Range:         <=0.039 ng/mL: Negative    0.04-0.779 ng/mL: Indeterminate Range. Suspicious of MI.  Clinical correlation required.       >=0.78  ng/mL: Consistent with myocardial injury.  Clinical correlation required.    Comprehensive Metabolic Panel [[id number]]  (Abnormal) Collected:  02/09/17 2125    Specimen:  Blood Updated:  02/09/17 2202     Glucose 85 mg/dL      BUN 23 mg/dL      Creatinine 1.40 (H) mg/dL      Sodium 139 mmol/L      Potassium 3.9 mmol/L      Chloride 106 mmol/L      CO2 19.0 (L) mmol/L      Calcium 9.2 mg/dL      Total Protein 7.0 g/dL      Albumin 4.10 g/dL      ALT (SGPT) 12 U/L      AST (SGOT) 16 U/L      Alkaline Phosphatase 90 U/L      Total Bilirubin 0.2 (L) mg/dL      eGFR Non African Amer 37 (L) mL/min/1.73      Globulin 2.9 gm/dL      A/G Ratio 1.4 (L) g/dL      BUN/Creatinine Ratio 16.4      Anion Gap 14.0 (H) mmol/L     Narrative:       National Kidney Foundation Guidelines    Stage                           Description                             GFR                      1                               Normal or High                          90+  2                               Mild decrease                            60-89  3                               Moderate decrease                   30-59  4                               Severe decrease                       15-29  5                                Kidney failure                             <15    POC Glucose Fingerstick [[id number]]  (Normal) Collected:  02/09/17 2227    Specimen:  Blood Updated:  02/09/17 2228     Glucose 88 mg/dL     Narrative:       Meter: [id number] : [id number] Jean Laura D    Reticulocytes [[id number]]  (Abnormal) Collected:  02/09/17 2125    Specimen:  Blood Updated:  02/09/17 2248     Reticulocyte % 2.46 (H) %     Reticulocytes [[id number]]  (Abnormal) Collected:  02/09/17 2349    Specimen:  Blood Updated:  02/10/17 0004     Reticulocyte % 2.05 (H) %     Ferritin [[id number]]  (Abnormal) Collected:  02/09/17 2349    Specimen:  Blood Updated:  02/10/17 0112     Ferritin 6.00 (L) ng/mL     Iron Profile [[id number]]  (Abnormal) Collected:  02/09/17 2349    Specimen:  Blood Updated:  02/10/17 0112     Iron 20 (L) mcg/dL      TIBC 428 mcg/dL      Iron Saturation 5 (L) %     Vitamin B12 [[id number]]  (Abnormal) Collected:  02/09/17 2349    Specimen:  Blood Updated:  02/10/17 0112     Vitamin B-12 1105 (H) pg/mL     Folate [[id number]]  (Abnormal) Collected:  02/09/17 2349    Specimen:  Blood Updated:  02/10/17 0112     Folate >24.00 (H) ng/mL     Narrative:         Folate Reference Ranges:    Deficient:            Less than 1.2 ng/mL  Indeterminant:        1.2-3.1 ng/mL  Normal:               3.2-20.0 ng/mL    Hemoglobin & Hematocrit, Blood [[id number]]  (Abnormal) Collected:  02/10/17 0159    Specimen:  Blood Updated:  02/10/17 0222     Hemoglobin 7.5 (L) g/dL      Hematocrit 26.0 (L) %     POC Glucose Fingerstick [[id number]]  (Normal) Collected:  02/10/17 0459    Specimen:  Blood Updated:  02/10/17 0503     Glucose 92 mg/dL     Narrative:       Meter: [id number] : [id number] Jean YEPEZ    Haptoglobin [[id number]] Collected:  02/10/17 0634    Specimen:  Blood Updated:  02/10/17 0701    CBC Auto Differential [[id number]]  (Abnormal) Collected:  02/10/17 0634    Specimen:  Blood Updated:  02/10/17 0719     WBC 5.47 10*3/mm3      RBC 3.19 (L)  10*6/mm3      Hemoglobin 7.4 (L) g/dL      Hematocrit 25.8 (L) %      MCV 80.9 fL      MCH 23.2 (L) pg      MCHC 28.7 (L) g/dL      RDW 16.3 (H) %      RDW-SD 48.5 fl      MPV 8.6 fL      Platelets 203 10*3/mm3      Neutrophil % 62.4 %      Lymphocyte % 23.0 (L) %      Monocyte % 9.7 %      Eosinophil % 4.2 (H) %      Basophil % 0.5 %      Immature Grans % 0.2 %      Neutrophils, Absolute 3.41 10*3/mm3      Lymphocytes, Absolute 1.26 10*3/mm3      Monocytes, Absolute 0.53 10*3/mm3      Eosinophils, Absolute 0.23 10*3/mm3      Basophils, Absolute 0.03 10*3/mm3      Immature Grans, Absolute 0.01 10*3/mm3     Lipid Panel [[id number]]  (Abnormal) Collected:  02/10/17 0634    Specimen:  Blood Updated:  02/10/17 0752     Total Cholesterol 160 mg/dL      Triglycerides 236 (H) mg/dL      HDL Cholesterol 42 mg/dL      LDL Cholesterol  71 mg/dL     Narrative:       Cholesterol Reference Ranges:   Desirable       < 200 mg/dL   Borderline    200-239 mg/dL   High Risk       > 239 mg/dL    Triglyceride Reference Ranges:   Normal          < 150 mg/dL   Borderline    150-199 mg/dL   High          200-499 mg/dL   Very High       > 499 mg/dL    HDL Reference Ranges:   Low              < 40 mg/dL   High             > 59 mg/dL    LDL Reference Ranges:   Optimal         < 100 mg/dL   Near Optimal  100-129 mg/dL   Borderline    130-159 mg/dL   High          160-189 mg/dL   Very High       > 189 mg/dL    TSH [[id number]]  (Normal) Collected:  02/10/17 0634    Specimen:  Blood Updated:  02/10/17 0752     TSH 3.107 mIU/mL     Hemoglobin A1c [[id number]]  (Normal) Collected:  02/10/17 0634    Specimen:  Blood Updated:  02/10/17 0804     Hemoglobin A1C 5.50 %     Narrative:       The American Diabetes Association recommends maintenance of Hemoglobin A1C at 7.0% or lower. Goals for Hemoglobin A1C reduction may need to be modified if hypoglycemia is a problem.    Comprehensive Metabolic Panel [[id number]]  (Abnormal) Collected:  02/10/17 0634     Specimen:  Blood Updated:  02/10/17 0808     Glucose 80 mg/dL      BUN 22 mg/dL      Creatinine 1.40 (H) mg/dL      Sodium 140 mmol/L      Potassium 4.5 mmol/L      Chloride 107 mmol/L      CO2 29.0 mmol/L      Calcium 9.1 mg/dL      Total Protein 6.8 g/dL      Albumin 3.90 g/dL      ALT (SGPT) 10 U/L      AST (SGOT) 11 U/L      Alkaline Phosphatase 83 U/L      Total Bilirubin 0.2 (L) mg/dL      eGFR Non African Amer 37 (L) mL/min/1.73      Globulin 2.9 gm/dL      A/G Ratio 1.3 (L) g/dL      BUN/Creatinine Ratio 15.7      Anion Gap 4.0 mmol/L     Narrative:       National Kidney Foundation Guidelines    Stage                           Description                             GFR                      1                               Normal or High                          90+  2                               Mild decrease                            60-89  3                               Moderate decrease                   30-59  4                               Severe decrease                       15-29  5                               Kidney failure                             <15          Rads:  Imaging Results (last 72 hours)     Procedure Component Value Units Date/Time    MRI Brain Without Contrast [[id number]]  (Abnormal) Collected:  02/09/17 2145     Updated:  02/09/17 2342    Narrative:       EXAM:    MR Head Without Intravenous Contrast.    CLINICAL HISTORY:    71 years old, female; Signs and symptoms; Weakness, extremity; Bilateral;   Patient HX: History of ischemic CVA, HTN, hyperlipidemia, vascular dementia,   ica disease evaluate for CVA, generalized weakness; Additional info: Rule out   CVA    TECHNIQUE:    Magnetic resonance images of the head/brain without intravenous contrast in   multiple planes.    COMPARISON:    MRI BRAIN WO CONTRAST 10/4/2016 5:28:57 AM    FINDINGS:      Brain:  Chronic left frontal and right occipital infarctions. Minimal   associated high signal on diffusion weighted imaging in the  right occipital   lobe is felt to most likely largely represent T2 shine through.  There are   periventricular and subcortical areas of flair high signal consistent with   chronic small vessel ischemic disease.  No evidence of hemorrhage or mass   effect.  The cortical sulci are enlarged consistent with cerebral atrophy.      Ventricles:  The ventricles are mildly enlarged.      Bones/joints:  Unremarkable.      Sinuses:  Unremarkable as visualized.      Mastoid air cells:  Unremarkable as visualized.  No mastoid effusion.      Orbits:  Unremarkable as visualized.      Impression:         Chronic infarctions as above. Minimal associated diffusion weighted high signal   in the right occipital region is most likely largely related to T2 shine   through. Minimal superimposed acute to subacute ischemic change cannot be   entirely excluded.      THIS DOCUMENT HAS BEEN ELECTRONICALLY SIGNED BY JADE JOSEPH MD            Assessment:  Multiple old strokes.  No evidence of acute infarction    New onset of anemia, likely contributing cause of her chronic weakness    Hypertensive crisis, likely cause of transient neurologic worsening    Syncope     Plan:    EEG    Discontinue Plavix    Comment:   She needs a anemia workup.    It appears that the risk of dual antiplatelet therapy at this point the exceeds the benefit.    I discussed the patients findings and my recommendations with patient      Kun Guevara MD  02/10/17  10:40 AM

## 2017-02-11 LAB
ANION GAP SERPL CALCULATED.3IONS-SCNC: 4 MMOL/L (ref 3–11)
BACTERIA SPEC AEROBE CULT: NORMAL
BUN BLD-MCNC: 24 MG/DL (ref 9–23)
BUN/CREAT SERPL: 16 (ref 7–25)
CALCIUM SPEC-SCNC: 9.4 MG/DL (ref 8.7–10.4)
CHLORIDE SERPL-SCNC: 108 MMOL/L (ref 99–109)
CO2 SERPL-SCNC: 26 MMOL/L (ref 20–31)
CREAT BLD-MCNC: 1.5 MG/DL (ref 0.6–1.3)
DEPRECATED RDW RBC AUTO: 50.5 FL (ref 37–54)
ERYTHROCYTE [DISTWIDTH] IN BLOOD BY AUTOMATED COUNT: 16.7 % (ref 11.3–14.5)
GFR SERPL CREATININE-BSD FRML MDRD: 34 ML/MIN/1.73
GLUCOSE BLD-MCNC: 99 MG/DL (ref 70–100)
HAPTOGLOB SERPL-MCNC: 209 MG/DL (ref 34–200)
HCT VFR BLD AUTO: 24.4 % (ref 34.5–44)
HGB BLD-MCNC: 6.9 G/DL (ref 11.5–15.5)
MCH RBC QN AUTO: 23.2 PG (ref 27–31)
MCHC RBC AUTO-ENTMCNC: 28.3 G/DL (ref 32–36)
MCV RBC AUTO: 81.9 FL (ref 80–99)
PLATELET # BLD AUTO: 180 10*3/MM3 (ref 150–450)
PMV BLD AUTO: 8.4 FL (ref 6–12)
POTASSIUM BLD-SCNC: 4.4 MMOL/L (ref 3.5–5.5)
RBC # BLD AUTO: 2.98 10*6/MM3 (ref 3.89–5.14)
SODIUM BLD-SCNC: 138 MMOL/L (ref 132–146)
WBC NRBC COR # BLD: 4.7 10*3/MM3 (ref 3.5–10.8)

## 2017-02-11 PROCEDURE — 36430 TRANSFUSION BLD/BLD COMPNT: CPT

## 2017-02-11 PROCEDURE — 94640 AIRWAY INHALATION TREATMENT: CPT

## 2017-02-11 PROCEDURE — 99226 PR SBSQ OBSERVATION CARE/DAY 35 MINUTES: CPT | Performed by: FAMILY MEDICINE

## 2017-02-11 PROCEDURE — 94760 N-INVAS EAR/PLS OXIMETRY 1: CPT

## 2017-02-11 PROCEDURE — 25010000002 NA FERRIC GLUC CPLX PER 12.5 MG: Performed by: INTERNAL MEDICINE

## 2017-02-11 PROCEDURE — G0378 HOSPITAL OBSERVATION PER HR: HCPCS

## 2017-02-11 PROCEDURE — 80048 BASIC METABOLIC PNL TOTAL CA: CPT | Performed by: FAMILY MEDICINE

## 2017-02-11 PROCEDURE — 86900 BLOOD TYPING SEROLOGIC ABO: CPT

## 2017-02-11 PROCEDURE — P9016 RBC LEUKOCYTES REDUCED: HCPCS

## 2017-02-11 PROCEDURE — 85027 COMPLETE CBC AUTOMATED: CPT | Performed by: FAMILY MEDICINE

## 2017-02-11 RX ADMIN — BUPROPION HYDROCHLORIDE 300 MG: 150 TABLET, FILM COATED, EXTENDED RELEASE ORAL at 09:37

## 2017-02-11 RX ADMIN — BUDESONIDE AND FORMOTEROL FUMARATE DIHYDRATE 2 PUFF: 80; 4.5 AEROSOL RESPIRATORY (INHALATION) at 08:58

## 2017-02-11 RX ADMIN — MEMANTINE HYDROCHLORIDE 10 MG: 10 TABLET, FILM COATED ORAL at 18:16

## 2017-02-11 RX ADMIN — SODIUM CHLORIDE 125 MG: 9 INJECTION, SOLUTION INTRAVENOUS at 11:00

## 2017-02-11 RX ADMIN — PANTOPRAZOLE SODIUM 40 MG: 40 TABLET, DELAYED RELEASE ORAL at 18:16

## 2017-02-11 RX ADMIN — ATORVASTATIN CALCIUM 80 MG: 40 TABLET, FILM COATED ORAL at 20:38

## 2017-02-11 RX ADMIN — BUDESONIDE AND FORMOTEROL FUMARATE DIHYDRATE 2 PUFF: 80; 4.5 AEROSOL RESPIRATORY (INHALATION) at 20:58

## 2017-02-11 RX ADMIN — MEMANTINE HYDROCHLORIDE 10 MG: 10 TABLET, FILM COATED ORAL at 09:37

## 2017-02-11 RX ADMIN — DULOXETINE 60 MG: 60 CAPSULE, DELAYED RELEASE ORAL at 09:37

## 2017-02-11 RX ADMIN — QUETIAPINE FUMARATE 150 MG: 100 TABLET ORAL at 20:31

## 2017-02-11 RX ADMIN — DONEPEZIL HYDROCHLORIDE 10 MG: 10 TABLET ORAL at 09:37

## 2017-02-11 RX ADMIN — TRAZODONE HYDROCHLORIDE 150 MG: 100 TABLET ORAL at 20:31

## 2017-02-11 RX ADMIN — SODIUM CHLORIDE 125 MG: 9 INJECTION, SOLUTION INTRAVENOUS at 10:41

## 2017-02-11 RX ADMIN — LEVOTHYROXINE SODIUM 25 MCG: 25 TABLET ORAL at 05:53

## 2017-02-11 RX ADMIN — DULOXETINE 60 MG: 60 CAPSULE, DELAYED RELEASE ORAL at 18:16

## 2017-02-11 RX ADMIN — AMLODIPINE BESYLATE 5 MG: 5 TABLET ORAL at 09:38

## 2017-02-11 RX ADMIN — PANTOPRAZOLE SODIUM 40 MG: 40 TABLET, DELAYED RELEASE ORAL at 09:37

## 2017-02-11 NOTE — PROGRESS NOTES
[facility] Medicine Services  INPATIENT PROGRESS NOTE    Date of Admission: 2/9/2017  Length of Stay: 0  Primary Care Physician: MONTANA Montez    Subjective     Chief Complaint:   weakness    HPI:  Pt agreeable to stay for transfusions, think it will help perk her up as suspect she has been quite anemic for a while.  Already has plan for outpt EGD/Cscope in [place] on 2/24/17. No new complaints.      Review Of Systems:   Review of Systems   Gastrointestinal: Negative for abdominal pain and blood in stool.   Neurological: Negative for weakness.         Objective      Temp:  [97.6 °F (36.4 °C)-98.6 °F (37 °C)] 97.6 °F (36.4 °C)  Heart Rate:  [66-88] 79  Resp:  [16] 16  BP: ()/(40-88) 108/40  Physical Exam  Temp:  [97.6 °F (36.4 °C)-98.6 °F (37 °C)] 97.6 °F (36.4 °C)  Heart Rate:  [66-88] 79  Resp:  [16] 16  BP: ()/(40-88) 108/40  Constitutional: no acute distress, awake, alert  HEENT: pale conjunctiva  Respiratory: Clear to auscultation bilaterally, nonlabored respirations   Cardiovascular: RRR  Gastrointestinal: Positive bowel sounds, soft, nontender, nondistended  Musculoskeletal: No bilateral ankle edema  Psychiatric: oriented x 3, appropriate affect, cooperative  Neurologic: Strength symmetric in all extremities       Results Review:    I have reviewed the labs, radiology results and diagnostic studies.      Results from last 7 days  Lab Units 02/11/17  0703   WBC 10*3/mm3 4.70   HEMOGLOBIN g/dL 6.9*   PLATELETS 10*3/mm3 180       Results from last 7 days  Lab Units 02/11/17  0703   SODIUM mmol/L 138   POTASSIUM mmol/L 4.4   TOTAL CO2 mmol/L 26.0   CREATININE mg/dL 1.50*   GLUCOSE mg/dL 99       Culture Data:   URINE CULTURE   Date Value Ref Range Status   02/09/2017 Normal Urogenital Ginger  Preliminary     Radiology Data:  reviewed    I have reviewed the medications.    Assessment/Plan     Assessment/Problem List  Active Problems:    Generalized weakness     Hypothyroid    Bipolar affective disorder    COPD (chronic obstructive pulmonary disease)    History of stroke    Iron deficiency anemia    CKD (chronic kidney disease), stage III    Vascular dementia    Hypertension    Hyperlipidemia    Tobacco abuse    Malignant hypertension    Orthostasis    Impaired functional mobility, balance, gait, and endurance           Plan    - episode at time of admission was probably accelerated HTN related  - no evidence of seizures or stroke  - pt very orthostatic, have ordered qshift orthostatic BP's and advised to be gentle going from positional changes.  Hesitant to add midodrine unless absolutely necessary since may interfere with essential HTN control.  - very anemic and Fe deficient compared to October 2016 labs, receiving few doses IV FeSO4 while here and at d/c will need BID FeSO4 tabs  - transfuse 2u pRBC today since Hgb now showing it's apoorva after receiving IVF's and currently 6.9  - Last Cscope ~4 yrs ago in [facility], have called and set her up for outpt EGD and Cscope with Dr. Quintanilla at [facility] for 2/24/17 (his office will call her 2 days prior with instructions and call in the bowel prep)  - Hx of prior CVA in Oct 2016 and has been on dual ASA/Plavix since then but will plan to HOLD PLAVIX at d/c (would continue with ASA only for now) until after EGD/Cscope and PCP can restart depending on those findings  - pt has been on daily omeprazole as outpt but at d/c would send home with Rx for BID Protonix PO for one month, then depending on scope findings further Rx's can be guided by Dr. Quintanilla (GI)   - on Friday [facility] made appt for pt to see PCP first week in March so can review EGD/Cscope and hopefully restart Plavix if indicated    :  Discharge Planning: I expect patient to be discharged home likely tomorrow    Krista Guevara MD   02/11/17   12:52 PM    Please note that portions of this note may have been completed with a voice recognition program. Efforts were made to edit the  dictations, but occasionally words are mistranscribed.

## 2017-02-12 VITALS
BODY MASS INDEX: 26.68 KG/M2 | RESPIRATION RATE: 16 BRPM | TEMPERATURE: 98.6 F | HEART RATE: 82 BPM | OXYGEN SATURATION: 94 % | WEIGHT: 170 LBS | SYSTOLIC BLOOD PRESSURE: 130 MMHG | DIASTOLIC BLOOD PRESSURE: 70 MMHG | HEIGHT: 67 IN

## 2017-02-12 LAB
ABO + RH BLD: NORMAL
ABO + RH BLD: NORMAL
BH BB BLOOD EXPIRATION DATE: NORMAL
BH BB BLOOD EXPIRATION DATE: NORMAL
BH BB BLOOD TYPE BARCODE: 9500
BH BB BLOOD TYPE BARCODE: 9500
BH BB DISPENSE STATUS: NORMAL
BH BB DISPENSE STATUS: NORMAL
BH BB PRODUCT CODE: NORMAL
BH BB PRODUCT CODE: NORMAL
BH BB UNIT NUMBER: NORMAL
BH BB UNIT NUMBER: NORMAL
CROSSMATCH INTERPRETATION: NORMAL
CROSSMATCH INTERPRETATION: NORMAL
HCT VFR BLD AUTO: 33.1 % (ref 34.5–44)
HGB BLD-MCNC: 10 G/DL (ref 11.5–15.5)
UNIT  ABO: NORMAL
UNIT  ABO: NORMAL
UNIT  RH: NORMAL
UNIT  RH: NORMAL

## 2017-02-12 PROCEDURE — G0378 HOSPITAL OBSERVATION PER HR: HCPCS

## 2017-02-12 PROCEDURE — 85018 HEMOGLOBIN: CPT | Performed by: FAMILY MEDICINE

## 2017-02-12 PROCEDURE — 97116 GAIT TRAINING THERAPY: CPT

## 2017-02-12 PROCEDURE — G8987 SELF CARE CURRENT STATUS: HCPCS | Performed by: OCCUPATIONAL THERAPIST

## 2017-02-12 PROCEDURE — 97110 THERAPEUTIC EXERCISES: CPT

## 2017-02-12 PROCEDURE — G8979 MOBILITY GOAL STATUS: HCPCS

## 2017-02-12 PROCEDURE — 99217 PR OBSERVATION CARE DISCHARGE MANAGEMENT: CPT | Performed by: NURSE PRACTITIONER

## 2017-02-12 PROCEDURE — G8980 MOBILITY D/C STATUS: HCPCS

## 2017-02-12 PROCEDURE — G8988 SELF CARE GOAL STATUS: HCPCS

## 2017-02-12 PROCEDURE — 97530 THERAPEUTIC ACTIVITIES: CPT | Performed by: OCCUPATIONAL THERAPIST

## 2017-02-12 PROCEDURE — 94640 AIRWAY INHALATION TREATMENT: CPT

## 2017-02-12 PROCEDURE — 85014 HEMATOCRIT: CPT | Performed by: FAMILY MEDICINE

## 2017-02-12 PROCEDURE — G8978 MOBILITY CURRENT STATUS: HCPCS

## 2017-02-12 PROCEDURE — G8989 SELF CARE D/C STATUS: HCPCS | Performed by: OCCUPATIONAL THERAPIST

## 2017-02-12 RX ORDER — FERROUS SULFATE 325(65) MG
325 TABLET ORAL 2 TIMES DAILY WITH MEALS
Qty: 60 TABLET | Refills: 0 | Status: SHIPPED | OUTPATIENT
Start: 2017-02-12 | End: 2017-12-20

## 2017-02-12 RX ORDER — PANTOPRAZOLE SODIUM 40 MG/1
40 TABLET, DELAYED RELEASE ORAL
Qty: 60 TABLET | Refills: 0 | Status: SHIPPED | OUTPATIENT
Start: 2017-02-12

## 2017-02-12 RX ADMIN — DONEPEZIL HYDROCHLORIDE 10 MG: 10 TABLET ORAL at 09:07

## 2017-02-12 RX ADMIN — AMLODIPINE BESYLATE 5 MG: 5 TABLET ORAL at 09:07

## 2017-02-12 RX ADMIN — DULOXETINE 60 MG: 60 CAPSULE, DELAYED RELEASE ORAL at 09:07

## 2017-02-12 RX ADMIN — BUDESONIDE AND FORMOTEROL FUMARATE DIHYDRATE 2 PUFF: 80; 4.5 AEROSOL RESPIRATORY (INHALATION) at 07:56

## 2017-02-12 RX ADMIN — PANTOPRAZOLE SODIUM 40 MG: 40 TABLET, DELAYED RELEASE ORAL at 09:07

## 2017-02-12 RX ADMIN — ASPIRIN 325 MG ORAL TABLET 325 MG: 325 PILL ORAL at 09:08

## 2017-02-12 RX ADMIN — LEVOTHYROXINE SODIUM 25 MCG: 25 TABLET ORAL at 05:54

## 2017-02-12 RX ADMIN — MEMANTINE HYDROCHLORIDE 10 MG: 10 TABLET, FILM COATED ORAL at 09:07

## 2017-02-12 RX ADMIN — BUPROPION HYDROCHLORIDE 300 MG: 150 TABLET, FILM COATED, EXTENDED RELEASE ORAL at 09:07

## 2017-02-12 NOTE — PLAN OF CARE
Problem: Patient Care Overview (Adult)  Goal: Plan of Care Review  Outcome: Unable to achieve outcome(s) by discharge Date Met:  02/12/17 02/12/17 1117   Coping/Psychosocial Response Interventions   Plan Of Care Reviewed With patient;daughter   Outcome Evaluation   Outcome Summary/Follow up Plan able to amb 350 ft (vs few steps on 2-10) w/ stable orthostat BP's, but c/o dizziness & ataxia (baseline since CVA oct '16); remains anemic (HGB 10.0 s/p 2 units yest);dtr will supv ther exer s/p d/c, & HHPT /OT planned (did not meet any goals)   Patient Care Overview   Progress progress towards functional goals is fair         Problem: Stroke (Ischemic) (Adult)  Goal: Signs and Symptoms of Listed Potential Problems Will be Absent or Manageable (Stroke)  Outcome: Unable to achieve outcome(s) by discharge Date Met:  02/12/17 02/12/17 1117   Stroke (Ischemic)   Problems Assessed (Stroke (Ischemic)/TIA) motor/sensory impairment   Problems Present (Stroke (Ischemic)/TIA) motor/sensory impairment         Problem: Inpatient Physical Therapy  Goal: Bed Mobility Goal LTG- PT  Outcome: Unable to achieve outcome(s) by discharge Date Met:  02/12/17 02/12/17 1117   Bed Mobility PT LTG   Bed Mobility PT LTG, Date Established 02/10/17   Bed Mobility PT LTG, Time to Achieve 2 wks   Bed Mobility PT LTG, Activity Type all bed mobility   Bed Mobility PT LTG, Garza Level independent   Bed Mobility PT LTG, Outcome goal not met   Bed Mobility PT LTG, Reason Goal Not Met discharged from facility       Goal: Transfer Training Goal 1 LTG- PT  Outcome: Unable to achieve outcome(s) by discharge Date Met:  02/12/17 02/12/17 1117   Transfer Training PT LTG   Transfer Training PT LTG, Date Established 02/10/17   Transfer Training PT LTG, Time to Achieve 2 wks   Transfer Training PT LTG, Activity Type all transfers   Transfer Training PT LTG, Garza Level independent   Transfer Training PT LTG, Outcome goal not met   Transfer  Training PT LTG, Reason Goal Not Met discharged from facility       Goal: Gait Training Goal LTG- PT  Outcome: Unable to achieve outcome(s) by discharge Date Met:  02/12/17 02/12/17 1117   Gait Training PT LTG   Gait Training Goal PT LTG, Date Established 02/10/17   Gait Training Goal PT LTG, Time to Achieve 2 wks   Gait Training Goal PT LTG, Dodge Level independent   Gait Training Goal PT LTG, Distance to Achieve 500   Gait Training Goal PT LTG, Outcome goal not met   Gait Training Goal PT LTG, Reason Goal Not Met discharged from facility

## 2017-02-12 NOTE — THERAPY DISCHARGE NOTE
Acute Care - Occupational Therapy Treatment Note/Discharge   [place]     Patient Name: Sunshine Her  : 1945  MRN: [id number]  Today's Date: 2017  Onset of Illness/Injury or Date of Surgery Date: 17  Date of Referral to OT: 17  Referring Physician: MONTANA MANJARREZ      Admit Date: 2017    Visit Dx:     ICD-10-CM ICD-9-CM   1. Impaired functional mobility, balance, gait, and endurance Z74.09 V49.89   2. Impaired mobility and ADLs Z74.09 799.89     Patient Active Problem List   Diagnosis   • Generalized weakness   • Acute on chronic renal insufficiency   • Anemia   • Symptomatic bradycardia   • Leukopenia   • Hypothyroid   • Bipolar affective disorder   • Kidney function abnormal   • COPD (chronic obstructive pulmonary disease)   • CVA (cerebral vascular accident)-Right Occipital/Temporal   • Chronic kidney disease (CKD), stage III (moderate)   • Dementia   • Vertigo   • History of stroke   • Iron deficiency anemia   • CKD (chronic kidney disease), stage III   • Vascular dementia   • Hypertension   • Hyperlipidemia   • Tobacco abuse   • Malignant hypertension   • Orthostasis   • Impaired functional mobility, balance, gait, and endurance             Adult Rehabilitation Note       17 1031 17 1018       Rehab Assessment/Intervention    Discipline occupational therapist  -AR (P)  physical therapist  -DM     Document Type therapy note (daily note);discharge summary  -AR (P)  therapy note (daily note);discharge summary  -DM     Subjective Information no complaints;agree to therapy  -AR (P)  agree to therapy;complains of;dizziness   sl. dizzy EOB/amb.;2 unitsPRBC's yest;HGB now10.0(Was 6.9)  -DM     Patient Effort, Rehab Treatment excellent  -AR (P)  good  -DM     Symptoms Noted During/After Treatment none  -AR (P)  dizziness;fatigue   dizziness persisted;orthostatBP;still anemic(HGBremains low)  -DM     Precautions/Limitations fall precautions  -AR (P)  fall precautions;other (see  comments)   bipolar; dementia;h/o vertigo;anemia  -DM     Equipment Issued to Patient bathing equipment;dressing equipment;gait belt   reacher and sponge  -AR      Recorded by [AR] Mary Pan OT [DM] Cherry Tejada, PT     Vital Signs    Pre Systolic BP Rehab 136  -AR (P)  130   sup.  -DM     Pre Treatment Diastolic BP 88  -AR (P)  70  -DM     Intra Systolic BP Rehab  (P)  136   sitting  -DM     Intra Treatment Diastolic BP  (P)  88  -DM     Post Systolic BP Rehab 130  -AR (P)  130   standing  -DM     Post Treatment Diastolic BP 61  -AR (P)  61  -DM     Pretreatment Heart Rate (beats/min) 88  -AR (P)  81  -DM     Intratreatment Heart Rate (beats/min)  (P)  91  -DM     Posttreatment Heart Rate (beats/min) 96  -AR (P)  82  -DM     Pre SpO2 (%) 97  -AR (P)  98  -DM     O2 Delivery Pre Treatment room air  -AR (P)  room air  -DM     Post SpO2 (%) 96  -AR (P)  96  -DM     O2 Delivery Post Treatment room air  -AR (P)  room air  -DM     Pre Patient Position Sitting  -AR (P)  Sitting  -DM     Intra Patient Position  (P)  Standing  -DM     Post Patient Position Standing  -AR (P)  Sitting  -DM     Recorded by [AR] Mary Pan OT [DM] Cherry Tejada, PT     Pain Assessment    Pain Assessment No/denies pain  -AR (P)  No/denies pain  -DM     Recorded by [AR] Mary Pan OT [DM] Cherry Tejada, PT     Cognitive Assessment/Intervention    Current Cognitive/Communication Assessment functional  -AR (P)  impaired   h/o dem.  -DM     Orientation Status oriented x 4  -AR (P)  oriented to;person;place  -DM     Follows Commands/Answers Questions 100% of the time;able to follow single-step instructions  -AR (P)  75% of the time;100% of the time;able to follow single-step instructions;needs cueing;needs increased time;needs repetition   trying to sit when instructed to stand for BP(pulls at cuff)  -DM     Personal Safety mild impairment  -AR (P)  mild impairment;decreased awareness, need for assist;decreased  awareness, need for safety;decreased insight to deficits;impulsive  -DM     Personal Safety Interventions fall prevention program maintained  -AR (P)  elopement precautions initiated;fall prevention program maintained;gait belt;nonskid shoes/slippers when out of bed   bed alarm off w/ dtr present  -DM     Recorded by [AR] Mary Pan, OT [DM] Cherry Tejada, PT     Bed Mobility, Assessment/Treatment    Bed Mobility, Scoot/Bridge, [place] independent  -AR      Bed Mobility, Comment  (P)  EOB  -DM     Recorded by [AR] Mary Pan, OT [DM] Cherry Tejada, PT     Transfer Assessment/Treatment    Transfers, Sit-Stand [place] stand by assist  -AR (P)  contact guard assist;verbal cues required  -DM     Transfers, Stand-Sit [place] stand by assist  -AR (P)  verbal cues required;contact guard assist;2 person assist required  -DM     Transfers, Sit-Stand-Sit, Assist Device  (P)  other (see comments)   GT BELT;PT grabbing for FOB  -DM     Bathtub Transfer, [place] --   unable simulate as pt pt has walk-in shiower in room  -AR      Transfer, Safety Issues  (P)  weight-shifting ability decreased  -DM     Transfer, Impairments  (P)  strength decreased;impaired balance  -DM     Transfer, Comment Dtr thinks pt has bench at home and not tub seat. recommenduse of bench die to lmild unstediness and issued handouts on places to obtain, educated them on safe car and tub bench transfer technque  -AR      Recorded by [AR] Mary Pan, OT [DM] Cherry Tejada, PT     Gait Assessment/Treatment    Gait, [place] Level  (P)  contact guard assist;2 person assist required;verbal cues required  -DM     Gait, Assistive Device  (P)  --   decl. R wx;GRABBING AT OBJECTS IN ROOM(cued to avoid)  -DM     Gait, Distance (Feet)  (P)  350  -DM     Gait, Gait Pattern Analysis  (P)  swing-through gait  -DM     Gait, Gait Deviations  (P)  ataxia;decreased heel strike;bilateral:;limb motion velocity  "decreased;narrow base;step length decreased;weight-shifting ability decreased   weaves L/R;tends to scissors;cues to incr.KINA,Focus ahead  -DM     Gait, Safety Issues  (P)  balance decreased during turns;sequencing ability decreased;step length decreased;weight-shifting ability decreased   incr. scissoring w/ turns;grabbing at objects,P.T.  -DM     Gait, Impairments  (P)  strength decreased;impaired balance;coordination impaired   \"Weaving since before CVAOct.'16\";c/o baseline min.dizziness  -DM     Gait, Comment  (P)  BP standing s/p gt  -DM     Recorded by  [DM] Cherry Tejada, PT     Functional Mobility    Functional Mobility- Ind. Level contact guard assist;verbal cues required  -AR      Functional Mobility- Device --   pt delined use of AD  -AR      Recorded by [AR] Mary Pan OT      Lower Body Bathing Assessment/Training    LB Bathing Assess/Train, Comment Issued LH sponge and educated pt on use  -AR      Recorded by [AR] Mary Pan OT      Lower Body Dressing Assessment/Training    LB Dressing Assess/Train, Comment Isued reacher to assist with LB dressing as pt reports fear of \"toppling over\" while bending  -AR      Recorded by [AR] Mary Pan, OT      Motor Skills/Interventions    Additional Documentation Balance Skills Training (Group)  -AR (P)  Balance Skills Training (Group)  -DM     Recorded by [AR] Mary Pan, OT [DM] Cherry Tejada, PT     Balance Skills Training    Sitting-Level of Assistance Independent  -AR (P)  Distant supervision  -DM     Sitting-Balance Support Feet supported  -AR (P)  Feet supported  -DM     Sitting-Balance Activities  (P)  Forward lean;Trunk control activities  -DM     Sitting # of Minutes 10  -AR (P)  10  -DM     Standing-Level of Assistance Contact guard  -AR (P)  Contact guard;x2  -DM     Static Standing Balance Support  (P)  No upper extremity supported  -DM     Standing-Balance Activities  (P)  Weight Shift A-P;Weight Shift R-L  -DM     " Standing Balance # of Minutes 10  -AR (P)  3   for BP reading  -DM     Recorded by [AR] Mary Pan, OT [DM] Cherry Tejada, SOLEDAD     Therapy Exercises    Bilateral Lower Extremities  (P)  AROM:;10 reps;sitting;ankle pumps/circles;hip abduction/adduction;hip flexion;LAQ   instructed in remaining exer:hip rot,mera, SAQ;Will do w/dtr  -DM     Bilateral Upper Extremity AROM:;5 reps;sitting  -AR      Recorded by [AR] Mary Pan, OT [DM] Cherry Tejada PT     Positioning and Restraints    Pre-Treatment Position in bed  -AR (P)  other (comment)   EOB  -DM     Post Treatment Position bed  -AR (P)  other   EOB  -DM     In Bed sitting EOB;call light within reach;encouraged to call for assist;with family/caregiver  -AR      Recorded by [AR] Mary Pan, OT [DM] Cherry Tejada PT       User Key  (r) = Recorded By, (t) = Taken By, (c) = Cosigned By    Initials Name Effective Dates    COSME Tejada, PT 06/19/15 -     AR Mary Pan, OT 06/22/15 -                 OT Goals       02/12/17 1111 02/10/17 0909       Transfer Training OT LTG    Transfer Training OT LTG, Date Established  02/10/17  -AN     Transfer Training OT LTG, Time to Achieve  1 wk  -AN     Transfer Training OT LTG, Activity Type  shower chair  -AN     Transfer Training OT LTG, [place] Level  conditional independence  -AN     Transfer Training OT LTG, Outcome goal not met  -AR      Transfer Training OT LTG, Reason Goal Not Met discharged from facility  -AR      Safety Awareness OT LTG    Safety Awareness OT LTG, Date Established  02/10/17  -AN     Safety Awareness OT LTG, Time to Achieve  1 wk  -AN     Safety Awareness OT LTG, Activity Type  with ADL's;good safety awareness  -AN     Safety Awareness OT LTG, Outcome goal met  -AR      Coordination OT LTG    Coordination OT LTG, Date Established  02/10/17  -AN     Coordination OT LTG, Time to Achieve  1 wk  -AN     Coordination OT LTG, Activity Type  FM task;GM task  -AN      Coordination OT LTG, [place] Level  independent  -AN     Coordination OT LTG, Outcome goal not met  -AR      Coordination OT LTG, Reason Goal Not Met discharged from facility  -AR      Functional Mobility OT LTG    Functional Mobility Goal OT LTG, Date Established  02/10/17  -AN     Functional Mobility Goal OT LTG, Time to Achieve  1 wk  -AN     Functional Mobility Goal OT LTG, [place] Level  modified independent  -AN     Functional Mobility Goal OT LTG, Distance to Achieve  to the bathroom  -AN     Functional Mobility Goal OT LTG, Outcome goal met  -AR        User Key  (r) = Recorded By, (t) = Taken By, (c) = Cosigned By    Initials Name Provider Type    AN Quiana Christianson, OT Occupational Therapist    AR Mary Pan, OT Occupational Therapist          Occupational Therapy Education     Title: PT OT SLP Therapies (Active)     Topic: Occupational Therapy (Done)     Point: ADL training (Done)    Description: Instruct learner(s) on proper safety adaptation and remediation techniques during self care or transfers.   Instruct in proper use of assistive devices.    Learning Progress Summary    Learner Readiness Method Response Comment Documented by Status   Patient JAME Bolivar D,H ANSELMO MARCUS Reviewed Ald retraining, AE use, transfert raining with emphasis on car/tub ench transfers, gait belt use, homem safety AR 02/12/17 1110 Done    Acceptance E MILIND MARCUS Reviewed need for assist at this time with all ADL's and transfers for safety. AN 02/10/17 0908 Done   Family JAME Bolivar D,H ANSELMO MARCUS Reviewed Ald retraining, AE use, transfert raining with emphasis on car/tub ench transfers, gait belt use, homem safety AR 02/12/17 1110 Done    Acceptance MILIND QUEZADA Reviewed need for assist at this time with all ADL's and transfers for safety. AN 02/10/17 0908 Done               Point: Home exercise program (Done)    Description: Instruct learner(s) on appropriate technique for monitoring, assisting and/or progressing therapeutic  exercises/activities.    Learning Progress Summary    Learner Readiness Method Response Comment Documented by Status   Patient Elioter TRAVIS,JAME,LUCHO,H ANSELMO MARCUS Reviewed Ald retraining, AE use, transfert raining with emphasis on car/tub ench transfers, gait belt use, homem safety AR 02/12/17 1110 Done   Family Eager E,JAME,LUCHO,H ANSELMO MARCUS Reviewed Ald retraining, AE use, transfert raining with emphasis on car/tub ench transfers, gait belt use, homem safety AR 02/12/17 1110 Done                      User Key     Initials Effective Dates Name Provider Type Discipline    AN 06/22/15 -  Quiana Christianson, OT Occupational Therapist OT    AR 06/22/15 -  Mary Pan, OT Occupational Therapist OT                OT Recommendation and Plan  Anticipated Equipment Needs At Discharge:  (TBD)  Anticipated Discharge Disposition: home with home health, home with assist  Therapy Frequency: daily  Plan of Care Review  Plan Of Care Reviewed With: patient, daughter  Progress: progress toward functional goals as expected  Outcome Summary/Follow up Plan: Pt anticiaptes DC home today with assist form family and HHOT/PT. Recommend use of tub bench. AE issued and pt educated on use, as well as places to obtain DME.          Outcome Measures       02/12/17 1031 02/10/17 0820 02/10/17 0818    How much help from another person do you currently need...    Turning from your back to your side while in flat bed without using bedrails?  4  -CD     Moving from lying on back to sitting on the side of a flat bed without bedrails?  3  -CD     Moving to and from a bed to a chair (including a wheelchair)?  3  -CD     Standing up from a chair using your arms (e.g., wheelchair, bedside chair)?  3  -CD     Climbing 3-5 steps with a railing?  3  -CD     To walk in hospital room?  3  -CD     AM-PAC 6 Clicks Score  19  -CD     How much help from another is currently needed...    Putting on and taking off regular lower body clothing? 3  -AR  3  -AN    Bathing (including washing,  rinsing, and drying) 3  -AR  3  -AN    Toileting (which includes using toilet bed pan or urinal) 3  -AR  3  -AN    Putting on and taking off regular upper body clothing 4  -AR  3  -AN    Taking care of personal grooming (such as brushing teeth) 4  -AR  4  -AN    Eating meals 4  -AR  4  -AN    Score 21  -AR  20  -AN    Modified Bollinger Scale    Modified Ava Scale 2 - Slight disability.  Unable to carry out all previous activities but able to look after own affairs without assistance.  -AR 3 - Moderate disability.  Requiring some help, but able to walk without assistance.  -CD 3 - Moderate disability.  Requiring some help, but able to walk without assistance.  -AN    Functional Assessment    Outcome Measure Options AM-PAC 6 Clicks Daily Activity (OT)  -AR AM-PAC 6 Clicks Basic Mobility (PT);Modified Bollinger  -CD AM-PAC 6 Clicks Daily Activity (OT);Modified [place]linger  -AN      User Key  (r) = Recorded By, (t) = Taken By, (c) = Cosigned By    Initials Name Provider Type    ТАТЬЯНА Salinas, PT Physical Therapist    TIFFANIE Christianson, OT Occupational Therapist    AR Mary Pna OT Occupational Therapist           Time Calculation:          Time Calculation- OT       02/12/17 1114          Time Calculation- OT    OT Start Time 1031  -AR      Total Timed Code Minutes- OT 17 minute(s)  -AR      OT Received On 02/12/17  -AR      OT Goal Re-Cert Due Date 02/20/17  -AR        User Key  (r) = Recorded By, (t) = Taken By, (c) = Cosigned By    Initials Name Provider Type    AR Mary Pan OT Occupational Therapist          Therapy Charges for Today     Code Description Service Date Service Provider Modifiers Qty    [id number]  OT THERAPEUTIC ACT EA 15 MIN 2/12/2017 Mary Pan OT GO 1    [id number]  OT THER SUPP EA 15 MIN 2/12/2017 Mary Pan OT GO 1    [id number]  OT SELFCARE CURRENT 2/12/2017 Mary Pan OT GO, CJ 1    [id number] HC OT SELFCARE DISCHARGE 2/12/2017 Mary Gaytan  JACQUE Pan GO, CJ 1          OT G-codes  OT Functional Scales Options: Modified [place]  Functional Assessment Tool Used: Modified [place]   Score: 2  Functional Limitation: Self care  Self Care Current Status (): At least 20 percent but less than 40 percent impaired, limited or restricted  Self Care Discharge Status (): At least 20 percent but less than 40 percent impaired, limited or restricted    OT Discharge Summary  Anticipated Discharge Disposition: home with home health, home with assist  Reason for Discharge: Discharge from facility  Outcomes Achieved: Patient able to partially acheive established goals  Discharge Destination: Home with home health, Home with assist    Mary Pan OT  2/12/2017

## 2017-02-12 NOTE — THERAPY DISCHARGE NOTE
Acute Care - Physical Therapy Treatment Note/Discharge  [facility]     Patient Name: Sunshine Her  : 1945  MRN: [id number]  Today's Date: 2017  Onset of Illness/Injury or Date of Surgery Date: 17  Date of Referral to PT: 17  Referring Physician: [place] MANJARREZ    Admit Date: 2017    Visit Dx:    ICD-10-CM ICD-9-CM   1. Impaired functional mobility, balance, gait, and endurance Z74.09 V49.89   2. Impaired mobility and ADLs Z74.09 799.89     Patient Active Problem List   Diagnosis   • Generalized weakness   • Acute on chronic renal insufficiency   • Anemia   • Symptomatic bradycardia   • Leukopenia   • Hypothyroid   • Bipolar affective disorder   • Kidney function abnormal   • COPD (chronic obstructive pulmonary disease)   • CVA (cerebral vascular accident)-Right Occipital/Temporal   • Chronic kidney disease (CKD), stage III (moderate)   • Dementia   • Vertigo   • History of stroke   • Iron deficiency anemia   • CKD (chronic kidney disease), stage III   • Vascular dementia   • Hypertension   • Hyperlipidemia   • Tobacco abuse   • Malignant hypertension   • Orthostasis   • Impaired functional mobility, balance, gait, and endurance       Physical Therapy Education     Title: PT OT SLP Therapies (Done)     Topic: Physical Therapy (Done)     Point: Mobility training (Done)    Learning Progress Summary    Learner Readiness Method Response Comment Documented by Status   Patient Eager LUCHO ROBLES H DU, VU DM 17 Done    Acceptance E NR BENEFITS OF OOB MOBILITY, SAFETY WITH MOBILITY, PROGRESSION OF POC, CD 02/10/17 0913 Active   Family Eager LUCHO ROBLES H DU, VU DM 17 111 Done    Acceptance E NR BENEFITS OF OOB MOBILITY, SAFETY WITH MOBILITY, PROGRESSION OF POC, CD 02/10/17 0913 Active               Point: Home exercise program (Done)    Learning Progress Summary    Learner Readiness Method Response Comment Documented by Status   Patient Eager LUCHO ROBLES H DU, VU DM 17 111 Done    Acceptance E  NR BENEFITS OF OOB MOBILITY, SAFETY WITH MOBILITY, PROGRESSION OF POC, CD 02/10/17 0913 Active   Family Eager E,D,H DU,VU  DM 02/12/17 1116 Done    Acceptance E NR BENEFITS OF OOB MOBILITY, SAFETY WITH MOBILITY, PROGRESSION OF POC, CD 02/10/17 0913 Active               Point: Body mechanics (Done)    Learning Progress Summary    Learner Readiness Method Response Comment Documented by Status   Patient Eager E,D,H DU,VU  DM 02/12/17 1116 Done    Acceptance E NR BENEFITS OF OOB MOBILITY, SAFETY WITH MOBILITY, PROGRESSION OF POC, CD 02/10/17 0913 Active   Family Eager E,D,H DU,VU  DM 02/12/17 1116 Done    Acceptance E NR BENEFITS OF OOB MOBILITY, SAFETY WITH MOBILITY, PROGRESSION OF POC, CD 02/10/17 0913 Active               Point: Precautions (Done)    Learning Progress Summary    Learner Readiness Method Response Comment Documented by Status   Patient Eager E,D,H DU,VU  DM 02/12/17 1116 Done    Acceptance E NR BENEFITS OF OOB MOBILITY, SAFETY WITH MOBILITY, PROGRESSION OF POC, CD 02/10/17 0913 Active   Family Eager E,D,H DU,VU  DM 02/12/17 1116 Done    Acceptance E NR BENEFITS OF OOB MOBILITY, SAFETY WITH MOBILITY, PROGRESSION OF POC, CD 02/10/17 0913 Active                      User Key     Initials Effective Dates Name Provider Type Discipline    CD 06/19/15 -  Makeda Salinas, PT Physical Therapist PT    DM 06/19/15 -  Cherry Tejada, PT Physical Therapist PT                    IP PT Goals       02/12/17 1117 02/10/17 0914       Bed Mobility PT LTG    Bed Mobility PT LTG, Date Established 02/10/17  -DM      Bed Mobility PT LTG, Time to Achieve 2 wks  -DM 2 wks  -CD     Bed Mobility PT LTG, Activity Type all bed mobility  -DM all bed mobility  -CD     Bed Mobility PT LTG, [place] Level independent  -DM independent  -CD     Bed Mobility PT LTG, Outcome goal not met  -DM      Bed Mobility PT LTG, Reason Goal Not Met discharged from facility  -DM      Transfer Training PT LTG    Transfer Training PT LTG, Date  Established 02/10/17  -DM      Transfer Training PT LTG, Time to Achieve 2 wks  -DM 2 wks  -CD     Transfer Training PT LTG, Activity Type all transfers  -DM all transfers  -CD     Transfer Training PT LTG, [place] Level independent  -DM independent  -CD     Transfer Training PT LTG, Assist Device  --   WITH LEAST RESTRICTIVE DEVICE.   -CD     Transfer Training PT LTG, Outcome goal not met  -DM      Transfer Training PT LTG, Reason Goal Not Met discharged from facility  -DM      Gait Training PT LTG    Gait Training Goal PT LTG, Date Established 02/10/17  -DM      Gait Training Goal PT LTG, Time to Achieve 2 wks  -DM 2 wks  -CD     Gait Training Goal PT LTG, [place] Level independent  -DM independent  -CD     Gait Training Goal PT LTG, Assist Device  --   WITH LEAST RESTRIVE DEVICE  -CD     Gait Training Goal PT LTG, Distance to Achieve 500  -  -CD     Gait Training Goal PT LTG, Outcome goal not met  -DM      Gait Training Goal PT LTG, Reason Goal Not Met discharged from facility  -DM        User Key  (r) = Recorded By, (t) = Taken By, (c) = Cosigned By    Initials Name Provider Type    CD Makeda Salinas, PT Physical Therapist    DM Cherry Tejada, PT Physical Therapist              Adult Rehabilitation Note       02/12/17 1031 02/12/17 1018       Rehab Assessment/Intervention    Discipline occupational therapist  -AR physical therapist  -DM     Document Type therapy note (daily note);discharge summary  -AR therapy note (daily note);discharge summary  -DM     Subjective Information no complaints;agree to therapy  -AR agree to therapy;complains of;dizziness   sl. dizzy EOB/amb.;2 unitsPRBC's yest;HGB now10.0(Was 6.9)  -DM     Patient Effort, Rehab Treatment excellent  -AR good  -DM     Symptoms Noted During/After Treatment none  -AR dizziness;fatigue   dizziness persisted;orthostatBP;still anemic(HGBremains low)  -DM     Precautions/Limitations fall precautions  -AR fall precautions;other (see  comments)   bipolar; dementia;h/o vertigo;anemia  -DM     Equipment Issued to Patient bathing equipment;dressing equipment;gait belt   reacher and sponge  -AR      Recorded by [AR] Mary Pan OT [DM] Cherry Tejada, PT     Vital Signs    Pre Systolic BP Rehab 136  -   sup.  -DM     Pre Treatment Diastolic BP 88  -AR 70  -DM     Intra Systolic BP Rehab  136   sitting  -DM     Intra Treatment Diastolic BP  88  -DM     Post Systolic BP Rehab 130  -   standing  -DM     Post Treatment Diastolic BP 61  -AR 61  -DM     Pretreatment Heart Rate (beats/min) 88  -AR 81  -DM     Intratreatment Heart Rate (beats/min)  91  -DM     Posttreatment Heart Rate (beats/min) 96  -AR 82  -DM     Pre SpO2 (%) 97  -AR 98  -DM     O2 Delivery Pre Treatment room air  -AR room air  -DM     Post SpO2 (%) 96  -AR 96  -DM     O2 Delivery Post Treatment room air  -AR room air  -DM     Pre Patient Position Sitting  -AR Sitting  -DM     Intra Patient Position  Standing  -DM     Post Patient Position Standing  -AR Sitting  -DM     Recorded by [AR] Mary Pan OT [DM] Cherry Tejada, PT     Pain Assessment    Pain Assessment No/denies pain  -AR No/denies pain  -DM     Recorded by [AR] Mary Pan OT [DM] Cherry Tejada, PT     Cognitive Assessment/Intervention    Current Cognitive/Communication Assessment functional  -AR impaired   h/o dem.  -DM     Orientation Status oriented x 4  -AR oriented to;person;place  -DM     Follows Commands/Answers Questions 100% of the time;able to follow single-step instructions  -AR 75% of the time;100% of the time;able to follow single-step instructions;needs cueing;needs increased time;needs repetition   trying to sit when instructed to stand for BP(pulls at cuff)  -DM     Personal Safety mild impairment  -AR mild impairment;decreased awareness, need for assist;decreased awareness, need for safety;decreased insight to deficits;impulsive  -DM     Personal Safety Interventions  fall prevention program maintained  -AR elopement precautions initiated;fall prevention program maintained;gait belt;nonskid shoes/slippers when out of bed   bed alarm off w/ dtr present  -DM     Recorded by [AR] Mary Pan, OT [DM] Cherry Tejada, PT     Bed Mobility, Assessment/Treatment    Bed Mobility, Scoot/Bridge, [place] independent  -AR      Bed Mobility, Comment  EOB  -DM     Recorded by [AR] Mary Pan, OT [DM] Cherry Tejada, PT     Transfer Assessment/Treatment    Transfers, Sit-Stand [place] stand by assist  -AR contact guard assist;verbal cues required  -DM     Transfers, Stand-Sit [place] stand by assist  -AR verbal cues required;contact guard assist;2 person assist required  -DM     Transfers, Sit-Stand-Sit, Assist Device  other (see comments)   GT BELT;PT grabbing for FOB  -DM     Bathtub Transfer, [place] --   unable simulate as pt pt has walk-in shiower in room  -AR      Transfer, Safety Issues  weight-shifting ability decreased  -DM     Transfer, Impairments  strength decreased;impaired balance  -DM     Transfer, Comment Dtr thinks pt has bench at home and not tub seat. recommenduse of bench die to lmild unstediness and issued handouts on places to obtain, educated them on safe car and tub bench transfer technque  -AR      Recorded by [AR] Mary Pan, OT [DM] Cherry Tejada, PT     Gait Assessment/Treatment    Gait, [place] Level  contact guard assist;2 person assist required;verbal cues required  -DM     Gait, Assistive Device  --   decl. R wx;GRABBING AT OBJECTS IN ROOM(cued to avoid)  -DM     Gait, Distance (Feet)  350  -DM     Gait, Gait Pattern Analysis  swing-through gait  -DM     Gait, Gait Deviations  ataxia;decreased heel strike;bilateral:;limb motion velocity decreased;narrow base;step length decreased;weight-shifting ability decreased   weaves L/R;tends to scissors;cues to incr.KINA,Focus ahead  -DM     Gait, Safety Issues  balance  "decreased during turns;sequencing ability decreased;step length decreased;weight-shifting ability decreased   incr. scissoring w/ turns;grabbing at objects,P.T.  -DM     Gait, Impairments  strength decreased;impaired balance;coordination impaired   \"Weaving since before CVAOct.'16\";c/o baseline min.dizziness  -DM     Gait, Comment  BP standing s/p gt  -DM     Recorded by  [DM] Cherry Tejada, PT     Functional Mobility    Functional Mobility- Ind. Level contact guard assist;verbal cues required  -AR      Functional Mobility- Device --   pt delined use of AD  -AR      Recorded by [AR] Mary Pan, OT      Lower Body Bathing Assessment/Training    LB Bathing Assess/Train, Comment Issued LH sponge and educated pt on use  -AR      Recorded by [AR] Mary Pan, OT      Lower Body Dressing Assessment/Training    LB Dressing Assess/Train, Comment Isued reacher to assist with LB dressing as pt reports fear of \"toppling over\" while bending  -AR      Recorded by [AR] Mary Pan, OT      Motor Skills/Interventions    Additional Documentation Balance Skills Training (Group)  -AR Balance Skills Training (Group)  -DM     Recorded by [AR] Mary Pan, OT [DM] Cherry Tejada, PT     Balance Skills Training    Sitting-Level of Assistance Independent  -AR Distant supervision  -DM     Sitting-Balance Support Feet supported  -AR Feet supported  -DM     Sitting-Balance Activities  Forward lean;Trunk control activities  -DM     Sitting # of Minutes 10  -AR 10  -DM     Standing-Level of Assistance Contact guard  -AR Contact guard;x2  -DM     Static Standing Balance Support  No upper extremity supported  -DM     Standing-Balance Activities  Weight Shift A-P;Weight Shift R-L  -DM     Standing Balance # of Minutes 10  -AR 3   for BP reading  -DM     Recorded by [AR] Mary Pan, OT [DM] Cherry Tejada, PT     Therapy Exercises    Bilateral Lower Extremities  AROM:;10 reps;sitting;ankle pumps/circles;hip " abduction/adduction;hip flexion;LAQ   instructed in remaining exer:hip rot,mera, SAQ;Will do w/dtr  -DM     Bilateral Upper Extremity AROM:;5 reps;sitting  -AR      Recorded by [AR] Mary Pan, OT [DM] Cherry Tejada, PT     Positioning and Restraints    Pre-Treatment Position in bed  -AR other (comment)   EOB  -DM     Post Treatment Position bed  -AR other   EOB  -DM     In Bed sitting EOB;call light within reach;encouraged to call for assist;with family/caregiver  -AR sitting EOB;call light within reach;with family/caregiver  -DM     Recorded by [AR] Mary Pan, OT [DM] Cherry Tejada, PT       User Key  (r) = Recorded By, (t) = Taken By, (c) = Cosigned By    Initials Name Effective Dates    COSME Tejada, PT 06/19/15 -     AR Mary Pan OT 06/22/15 -           PT Recommendation and Plan  Anticipated Discharge Disposition: home with home health  Planned Therapy Interventions: balance training, bed mobility training, gait training, patient/family education, transfer training, stair training, strengthening  PT Frequency: daily  Plan of Care Review  Plan Of Care Reviewed With: patient, daughter  Progress: progress towards functional goals is fair  Outcome Summary/Follow up Plan: able to amb 350 ft (vs few steps on 2-10) w/ stable orthostat BP's, but c/o dizziness & ataxia (baseline since CVA oct '16); remains anemic (HGB 10.0 s/p 2 units yest);dtr will supv  ther exer s/p d/c, & HHPT /OT planned (did not meet any goals)          Outcome Measures       02/12/17 1031 02/12/17 1018 02/10/17 0820    How much help from another person do you currently need...    Turning from your back to your side while in flat bed without using bedrails?  4  -DM 4  -CD    Moving from lying on back to sitting on the side of a flat bed without bedrails?  3  -DM 3  -CD    Moving to and from a bed to a chair (including a wheelchair)?  3  -DM 3  -CD    Standing up from a chair using your arms (e.g., wheelchair,  bedside chair)?  3  -DM 3  -CD    Climbing 3-5 steps with a railing?  3  -DM 3  -CD    To walk in hospital room?  3  -DM 3  -CD    AM-PAC 6 Clicks Score  19  -DM 19  -CD    How much help from another is currently needed...    Putting on and taking off regular lower body clothing? 3  -AR      Bathing (including washing, rinsing, and drying) 3  -AR      Toileting (which includes using toilet bed pan or urinal) 3  -AR      Putting on and taking off regular upper body clothing 4  -AR      Taking care of personal grooming (such as brushing teeth) 4  -AR      Eating meals 4  -AR      Score 21  -AR      Modified Ava Scale    Modified Ava Scale 2 - Slight disability.  Unable to carry out all previous activities but able to look after own affairs without assistance.  -AR 2 - Slight disability.  Unable to carry out all previous activities but able to look after own affairs without assistance.  -DM 3 - Moderate disability.  Requiring some help, but able to walk without assistance.  -CD    Functional Assessment    Outcome Measure Options AM-PAC 6 Clicks Daily Activity (OT)  -AR AM-PAC 6 Clicks Basic Mobility (PT);Modified [place]  -DM AM-PAC 6 Clicks Basic Mobility (PT);Modified [place]  -CD      02/10/17 0818          How much help from another is currently needed...    Putting on and taking off regular lower body clothing? 3  -AN      Bathing (including washing, rinsing, and drying) 3  -AN      Toileting (which includes using toilet bed pan or urinal) 3  -AN      Putting on and taking off regular upper body clothing 3  -AN      Taking care of personal grooming (such as brushing teeth) 4  -AN      Eating meals 4  -AN      Score 20  -AN      Modified [place] Scale    Modified [place] Scale 3 - Moderate disability.  Requiring some help, but able to walk without assistance.  -AN      Functional Assessment    Outcome Measure Options AM-PAC 6 Clicks Daily Activity (OT);Modified Ava  -AN        User Key  (r) = Recorded By, (t)  = Taken By, (c) = Cosigned By    Initials Name Provider Type    ТАТЬЯНА Salinas, PT Physical Therapist    COSEM Tejada, PT Physical Therapist    TIFFANIE Christianson, OT Occupational Therapist    MARIA VICTORIA Pan, OT Occupational Therapist           Time Calculation:         PT Charges       02/12/17 1125          Time Calculation    Start Time 1018  -DM      PT Received On 02/12/17  -DM      PT Goal Re-Cert Due Date 02/20/17  -DM      Time Calculation- PT    Total Timed Code Minutes- PT 23 minute(s)  -DM        User Key  (r) = Recorded By, (t) = Taken By, (c) = Cosigned By    Initials Name Provider Type    DM Cherry Tejada, PT Physical Therapist          Therapy Charges for Today     Code Description Service Date Service Provider Modifiers Qty    56872178780 HC PT MOBILITY CURRENT 2/12/2017 Cherry Tejada, PT GP, CK 1    45876106081 HC PT MOBILITY PROJECTED 2/12/2017 Cherry Tejada, PT GP, CI 1    15636428329 HC PT MOBILITY DISCHARGE 2/12/2017 Cherry Tejada, PT GP, CK 1    22573145239 HC PT THER PROC EA 15 MIN 2/12/2017 Cherry Tejada, PT GP 1    88600747158 HC GAIT TRAINING EA 15 MIN 2/12/2017 Cherry Tejada, PT GP 1          PT G-Codes  PT Professional Judgement Used?: Yes  Outcome Measure Options: AM-PAC 6 Clicks Basic Mobility (PT)  Score: 19  Functional Limitation: Mobility: Walking and moving around  Mobility: Walking and Moving Around Current Status (): At least 40 percent but less than 60 percent impaired, limited or restricted  Mobility: Walking and Moving Around Goal Status (): At least 1 percent but less than 20 percent impaired, limited or restricted  Mobility: Walking and Moving Around Discharge Status (): At least 40 percent but less than 60 percent impaired, limited or restricted    PT Discharge Summary  Anticipated Discharge Disposition: home with home health  Reason for Discharge: Discharge from facility  Outcomes Achieved: Patient able to partially acheive  established goals  Discharge Destination: Home with home health    Cherry Tejada, PT  2/12/2017

## 2017-02-23 RX ORDER — CLOPIDOGREL BISULFATE 75 MG/1
75 TABLET ORAL DAILY
COMMUNITY
End: 2017-02-28 | Stop reason: ALTCHOICE

## 2017-02-24 ENCOUNTER — HOSPITAL ENCOUNTER (OUTPATIENT)
Facility: HOSPITAL | Age: 72
Setting detail: HOSPITAL OUTPATIENT SURGERY
Discharge: HOME | End: 2017-02-24
Attending: INTERNAL MEDICINE | Admitting: INTERNAL MEDICINE

## 2017-02-24 ENCOUNTER — ANESTHESIA (OUTPATIENT)
Dept: PERIOP | Facility: HOSPITAL | Age: 72
End: 2017-02-24

## 2017-02-24 ENCOUNTER — ANESTHESIA EVENT (OUTPATIENT)
Dept: PERIOP | Facility: HOSPITAL | Age: 72
End: 2017-02-24

## 2017-02-24 VITALS
HEIGHT: 67 IN | OXYGEN SATURATION: 98 % | WEIGHT: 167 LBS | TEMPERATURE: 98 F | BODY MASS INDEX: 26.21 KG/M2 | SYSTOLIC BLOOD PRESSURE: 153 MMHG | HEART RATE: 76 BPM | DIASTOLIC BLOOD PRESSURE: 97 MMHG | RESPIRATION RATE: 18 BRPM

## 2017-02-24 DIAGNOSIS — R10.9 ABDOMINAL PAIN, UNSPECIFIED LOCATION: ICD-10-CM

## 2017-02-24 DIAGNOSIS — Z12.11 COLON CANCER SCREENING: ICD-10-CM

## 2017-02-24 PROCEDURE — 25010000002 PROPOFOL 1000 MG/ML EMULSION: Performed by: NURSE ANESTHETIST, CERTIFIED REGISTERED

## 2017-02-24 PROCEDURE — 43239 EGD BIOPSY SINGLE/MULTIPLE: CPT | Performed by: INTERNAL MEDICINE

## 2017-02-24 PROCEDURE — 88305 TISSUE EXAM BY PATHOLOGIST: CPT | Performed by: INTERNAL MEDICINE

## 2017-02-24 PROCEDURE — 25010000002 PROPOFOL 10 MG/ML EMULSION: Performed by: NURSE ANESTHETIST, CERTIFIED REGISTERED

## 2017-02-24 PROCEDURE — 45385 COLONOSCOPY W/LESION REMOVAL: CPT | Performed by: INTERNAL MEDICINE

## 2017-02-24 PROCEDURE — 25010000002 FENTANYL CITRATE (PF) 100 MCG/2ML SOLUTION: Performed by: NURSE ANESTHETIST, CERTIFIED REGISTERED

## 2017-02-24 RX ORDER — SODIUM CHLORIDE, SODIUM LACTATE, POTASSIUM CHLORIDE, CALCIUM CHLORIDE 600; 310; 30; 20 MG/100ML; MG/100ML; MG/100ML; MG/100ML
125 INJECTION, SOLUTION INTRAVENOUS CONTINUOUS
Status: DISCONTINUED | OUTPATIENT
Start: 2017-02-24 | End: 2017-02-24 | Stop reason: HOSPADM

## 2017-02-24 RX ORDER — PROPOFOL 10 MG/ML
VIAL (ML) INTRAVENOUS AS NEEDED
Status: DISCONTINUED | OUTPATIENT
Start: 2017-02-24 | End: 2017-02-24 | Stop reason: SURG

## 2017-02-24 RX ORDER — FENTANYL CITRATE 50 UG/ML
INJECTION, SOLUTION INTRAMUSCULAR; INTRAVENOUS AS NEEDED
Status: DISCONTINUED | OUTPATIENT
Start: 2017-02-24 | End: 2017-02-24 | Stop reason: SURG

## 2017-02-24 RX ORDER — IPRATROPIUM BROMIDE AND ALBUTEROL SULFATE 2.5; .5 MG/3ML; MG/3ML
3 SOLUTION RESPIRATORY (INHALATION) ONCE AS NEEDED
Status: CANCELLED | OUTPATIENT
Start: 2017-02-24

## 2017-02-24 RX ORDER — SODIUM CHLORIDE 0.9 % (FLUSH) 0.9 %
1-10 SYRINGE (ML) INJECTION AS NEEDED
Status: DISCONTINUED | OUTPATIENT
Start: 2017-02-24 | End: 2017-02-24 | Stop reason: HOSPADM

## 2017-02-24 RX ORDER — ONDANSETRON 2 MG/ML
4 INJECTION INTRAMUSCULAR; INTRAVENOUS ONCE AS NEEDED
Status: CANCELLED | OUTPATIENT
Start: 2017-02-24

## 2017-02-24 RX ORDER — LIDOCAINE HYDROCHLORIDE 20 MG/ML
INJECTION, SOLUTION INFILTRATION; PERINEURAL AS NEEDED
Status: DISCONTINUED | OUTPATIENT
Start: 2017-02-24 | End: 2017-02-24 | Stop reason: SURG

## 2017-02-24 RX ADMIN — FENTANYL CITRATE 25 MCG: 50 INJECTION INTRAMUSCULAR; INTRAVENOUS at 12:50

## 2017-02-24 RX ADMIN — SODIUM CHLORIDE, POTASSIUM CHLORIDE, SODIUM LACTATE AND CALCIUM CHLORIDE: 600; 310; 30; 20 INJECTION, SOLUTION INTRAVENOUS at 12:47

## 2017-02-24 RX ADMIN — PROPOFOL 50 MG: 10 INJECTION, EMULSION INTRAVENOUS at 12:50

## 2017-02-24 RX ADMIN — LIDOCAINE HYDROCHLORIDE 60 MG: 20 INJECTION, SOLUTION INFILTRATION; PERINEURAL at 12:50

## 2017-02-24 RX ADMIN — PROPOFOL 100 MCG/KG/MIN: 10 INJECTION, EMULSION INTRAVENOUS at 12:52

## 2017-02-24 RX ADMIN — PROPOFOL 25 MG: 10 INJECTION, EMULSION INTRAVENOUS at 12:55

## 2017-02-24 NOTE — PLAN OF CARE
Problem: GI Endoscopy (Adult)  Goal: Signs and Symptoms of Listed Potential Problems Will be Absent or Manageable (GI Endoscopy)  Outcome: Ongoing (interventions implemented as appropriate)    02/24/17 1254   GI Endoscopy   Problems Assessed (GI Endoscopy) all   Problems Present (GI Endoscopy) none

## 2017-02-24 NOTE — DISCHARGE INSTRUCTIONS
General Anesthesia, Adult, Care After  Refer to this sheet in the next few weeks. These instructions provide you with information on caring for yourself after your procedure. Your health care provider may also give you more specific instructions. Your treatment has been planned according to current medical practices, but problems sometimes occur. Call your health care provider if you have any problems or questions after your procedure.  WHAT TO EXPECT AFTER THE PROCEDURE  After the procedure, it is typical to experience:  · Sleepiness.  · Nausea and vomiting.  HOME CARE INSTRUCTIONS  · For the first 24 hours after general anesthesia:  ¨ Have a responsible person with you.  ¨ Do not drive a car. If you are alone, do not take public transportation.  ¨ Do not drink alcohol.  ¨ Do not take medicine that has not been prescribed by your health care provider.  ¨ Do not sign important papers or make important decisions.  ¨ You may resume a normal diet and activities as directed by your health care provider.  · Change bandages (dressings) as directed.  · If you have questions or problems that seem related to general anesthesia, call the hospital and ask for the anesthetist or anesthesiologist on call.  SEEK MEDICAL CARE IF:  · You have nausea and vomiting that continue the day after anesthesia.  · You develop a rash.  SEEK IMMEDIATE MEDICAL CARE IF:    · You have difficulty breathing.  · You have chest pain.  · You have any allergic problems.  This information is not intended to replace advice given to you by your health care provider. Make sure you discuss any questions you have with your health care provider.  Document Released: 03/26/2002 Document Revised: 01/08/2016 Document Reviewed: 07/03/2014  [employer] Interactive Patient Education ©2016 [employer]      Esophagogastroduodenoscopy, Care After  Refer to this sheet in the next few weeks. These instructions provide you with information about caring for yourself after  your procedure. Your health care provider may also give you more specific instructions. Your treatment has been planned according to current medical practices, but problems sometimes occur. Call your health care provider if you have any problems or questions after your procedure.  WHAT TO EXPECT AFTER THE PROCEDURE  After your procedure, it is typical to feel:  · Soreness in your throat.  · Pain with swallowing.  · Sick to your stomach (nauseous).  · Bloated.  · Dizzy.  · Fatigued.  HOME CARE INSTRUCTIONS  · Do not eat or drink anything until the numbing medicine (local anesthetic) has worn off and your gag reflex has returned. You will know that the local anesthetic has worn off when you can swallow comfortably.  · Do not drive or operate machinery until directed by your health care provider.  · Take medicines only as directed by your health care provider.  SEEK MEDICAL CARE IF:    · You cannot stop coughing.  · You are not urinating at all or less than usual.  SEEK IMMEDIATE MEDICAL CARE IF:  · You have difficulty swallowing.  · You cannot eat or drink.  · You have worsening throat or chest pain.  · You have dizziness or lightheadedness or you faint.  · You have nausea or vomiting.  · You have chills.  · You have a fever.  · You have severe abdominal pain.  · You have black, tarry, or bloody stools.  This information is not intended to replace advice given to you by your health care provider. Make sure you discuss any questions you have with your health care provider.  Document Released: 12/04/2013 Document Revised: 01/08/2016 Document Reviewed: 12/04/2013  [employer] Interactive Patient Education ©2016 [employer]      Colonoscopy, Care After  Refer to this sheet in the next few weeks. These instructions provide you with information on caring for yourself after your procedure. Your health care provider may also give you more specific instructions. Your treatment has been planned according to current medical  practices, but problems sometimes occur. Call your health care provider if you have any problems or questions after your procedure.  WHAT TO EXPECT AFTER THE PROCEDURE    After your procedure, it is typical to have the following:  · A small amount of blood in your stool.  · Moderate amounts of gas and mild abdominal cramping or bloating.  HOME CARE INSTRUCTIONS  · Do not drive, operate machinery, or sign important documents for 24 hours.  · You may shower and resume your regular physical activities, but move at a slower pace for the first 24 hours.  · Take frequent rest periods for the first 24 hours.  · Walk around or put a warm pack on your abdomen to help reduce abdominal cramping and bloating.  · Drink enough fluids to keep your urine clear or pale yellow.  · You may resume your normal diet as instructed by your health care provider. Avoid heavy or fried foods that are hard to digest.  · Avoid drinking alcohol for 24 hours or as instructed by your health care provider.  · Only take over-the-counter or prescription medicines as directed by your health care provider.  · If a tissue sample (biopsy) was taken during your procedure:  ¨ Do not take aspirin or blood thinners for 7 days, or as instructed by your health care provider.  ¨ Do not drink alcohol for 7 days, or as instructed by your health care provider.  ¨ Eat soft foods for the first 24 hours.  SEEK MEDICAL CARE IF:  You have persistent spotting of blood in your stool 2-3 days after the procedure.  SEEK IMMEDIATE MEDICAL CARE IF:  · You have more than a small spotting of blood in your stool.  · You pass large blood clots in your stool.  · Your abdomen is swollen (distended).  · You have nausea or vomiting.  · You have a fever.  · You have increasing abdominal pain that is not relieved with medicine.  This information is not intended to replace advice given to you by your health care provider. Make sure you discuss any questions you have with your health  care provider.  Document Released: 08/01/2005 Document Revised: 10/08/2014 Document Reviewed: 08/25/2014  [employer] Interactive Patient Education ©2016 [employer]      Colon Polyps  Polyps are lumps of extra tissue growing inside the body. Polyps can grow in the large intestine (colon). Most colon polyps are noncancerous (benign). However, some colon polyps can become cancerous over time. Polyps that are larger than a pea may be harmful. To be safe, caregivers remove and test all polyps.  CAUSES    Polyps form when mutations in the genes cause your cells to grow and divide even though no more tissue is needed.  RISK FACTORS  There are a number of risk factors that can increase your chances of getting colon polyps. They include:  · Being older than 50 years.  · Family history of colon polyps or colon cancer.  · Long-term colon diseases, such as colitis or Crohn disease.  · Being overweight.  · Smoking.  · Being inactive.  · Drinking too much alcohol.  SYMPTOMS    Most small polyps do not cause symptoms. If symptoms are present, they may include:  · Blood in the stool. The stool may look dark red or black.  · Constipation or diarrhea that lasts longer than 1 week.  DIAGNOSIS  People often do not know they have polyps until their caregiver finds them during a regular checkup. Your caregiver can use 4 tests to check for polyps:  · Digital rectal exam. The caregiver wears gloves and feels inside the rectum. This test would find polyps only in the rectum.  · Barium enema. The caregiver puts a liquid called barium into your rectum before taking X-rays of your colon. Barium makes your colon look white. Polyps are dark, so they are easy to see in the X-ray pictures.  · Sigmoidoscopy. A thin, flexible tube (sigmoidoscope) is placed into your rectum. The sigmoidoscope has a light and tiny camera in it. The caregiver uses the sigmoidoscope to look at the last third of your colon.  · Colonoscopy. This test is like  sigmoidoscopy, but the caregiver looks at the entire colon. This is the most common method for finding and removing polyps.  TREATMENT    Any polyps will be removed during a sigmoidoscopy or colonoscopy. The polyps are then tested for cancer.  PREVENTION    To help lower your risk of getting more colon polyps:  · Eat plenty of fruits and vegetables. Avoid eating fatty foods.  · Do not smoke.  · Avoid drinking alcohol.  · Exercise every day.  · Lose weight if recommended by your caregiver.  · Eat plenty of calcium and folate. Foods that are rich in calcium include milk, cheese, and broccoli. Foods that are rich in folate include chickpeas, kidney beans, and spinach.  HOME CARE INSTRUCTIONS  Keep all follow-up appointments as directed by your caregiver. You may need periodic exams to check for polyps.  SEEK MEDICAL CARE IF:  You notice bleeding during a bowel movement.  This information is not intended to replace advice given to you by your health care provider. Make sure you discuss any questions you have with your health care provider.  Document Released: 09/13/2005 Document Revised: 01/08/2016 Document Reviewed: 02/26/2013  [employer] Interactive Patient Education ©2016 [employer]        Diverticulosis    Diverticulosis is the condition that develops when small pouches (diverticula) form in the wall of your colon. Your colon, or large intestine, is where water is absorbed and stool is formed. The pouches form when the inside layer of your colon pushes through weak spots in the outer layers of your colon.  CAUSES    No one knows exactly what causes diverticulosis.  RISK FACTORS  · Being older than 50. Your risk for this condition increases with age. Diverticulosis is rare in people younger than 40 years. By age 80, almost everyone has it.  · Eating a low-fiber diet.  · Being frequently constipated.  · Being overweight.  · Not getting enough exercise.  · Smoking.  · Taking over-the-counter pain medicines, like  aspirin and ibuprofen.  SYMPTOMS    Most people with diverticulosis do not have symptoms.  DIAGNOSIS    Because diverticulosis often has no symptoms, health care providers often discover the condition during an exam for other colon problems. In many cases, a health care provider will diagnose diverticulosis while using a flexible scope to examine the colon (colonoscopy).  TREATMENT    If you have never developed an infection related to diverticulosis, you may not need treatment. If you have had an infection before, treatment may include:  · Eating more fruits, vegetables, and grains.  · Taking a fiber supplement.  · Taking a live bacteria supplement (probiotic).  · Taking medicine to relax your colon.  HOME CARE INSTRUCTIONS    · Drink at least 6-8 glasses of water each day to prevent constipation.  · Try not to strain when you have a bowel movement.  · Keep all follow-up appointments.  If you have had an infection before:   · Increase the fiber in your diet as directed by your health care provider or dietitian.  · Take a dietary fiber supplement if your health care provider approves.  · Only take medicines as directed by your health care provider.  SEEK MEDICAL CARE IF:    · You have abdominal pain.  · You have bloating.  · You have cramps.  · You have not gone to the bathroom in 3 days.  SEEK IMMEDIATE MEDICAL CARE IF:    · Your pain gets worse.  · Your bloating becomes very bad.  · You have a fever or chills, and your symptoms suddenly get worse.  · You begin vomiting.  · You have bowel movements that are bloody or black.  MAKE SURE YOU:  · Understand these instructions.  · Will watch your condition.  · Will get help right away if you are not doing well or get worse.  This information is not intended to replace advice given to you by your health care provider. Make sure you discuss any questions you have with your health care provider.  Document Released: 09/14/2005 Document Revised: 12/23/2014 Document Reviewed:  11/12/2014  [employer] Interactive Patient Education ©2016 [employer]

## 2017-02-24 NOTE — ANESTHESIA PREPROCEDURE EVALUATION
Anesthesia Evaluation     Patient summary reviewed and Nursing notes reviewed   no history of anesthetic complications:  NPO Status: > 8 hours   Airway   Mallampati: II  TM distance: >3 FB  Neck ROM: full  no difficulty expected  Dental    (+) edentulous    Pulmonary - normal exam   (+) a smoker, COPD,   (-) asthma  Cardiovascular - normal exam  Exercise tolerance: good (4-7 METS)    NYHA Classification: II    (+) hypertension, CAD, dysrhythmias Bradycardia, PVD,   (-) past MI, angina, CHF      Neuro/Psych  (+) TIA, CVA (left sided weakness) residual symptoms, dizziness/light headedness, weakness, psychiatric history Anxiety, Depression and Bipolar, dementia, poor historian.,    (-) seizures  GI/Hepatic/Renal/Endo    (+)  GERD, chronic renal disease CRI, hypothyroidism,   (-) diabetes    Musculoskeletal     Abdominal  - normal exam    Bowel sounds: normal.   Substance History - negative use     OB/GYN negative ob/gyn ROS         Other   (+) arthritis     ROS/Med Hx Other: Hard of hearing                              Anesthesia Plan    ASA 3     general     intravenous induction   Anesthetic plan and risks discussed with patient.  Use of blood products discussed with patient  Consented to blood products.

## 2017-02-24 NOTE — H&P
"History and physical examination  February 24, 2017    HPI  71-year-old white female presents for EGD and screening colonoscopy in order to investigate unexplained anemia and suspected GI blood loss.  Colonoscopy was last performed about 2 years ago.      Review of Systems   Negative and noncontributory.    ACTIVE PROBLEMS:   Specialty Problems     None          PAST MEDICAL HISTORY:  Past Medical History   Diagnosis Date   • Acute on chronic renal insufficiency 7/15/2016   • Anemia    • Anxiety    • Arthritis    • Arthritis    • Bipolar 1 disorder    • Bradycardia    • COPD (chronic obstructive pulmonary disease)    • Coronary artery disease    • Dementia    • Disease of thyroid gland    • Generalized weakness 7/14/2016   • GERD (gastroesophageal reflux disease)    • Heart disease    • History of transfusion    • Hyperlipidemia    • Hypertension    • PAD (peripheral artery disease)    • Stroke    • TIA (transient ischemic attack)    • Vertigo        SURGICAL HISTORY:  Past Surgical History   Procedure Laterality Date   • Carotid endarterectomy Bilateral    • Endoscopy     • Angioplasty subclavian artery     • Renal artery bypass     • Colonoscopy  2012       FAMILY HISTORY:  Family History   Problem Relation Age of Onset   • Heart disease Mother    • Diabetes Mother    • Cancer Father    • Diabetes Sister    • Cancer Brother        SOCIAL HISTORY:  Social History   Substance Use Topics   • Smoking status: Former Smoker     Packs/day: 1.00     Years: 40.00     Types: Cigarettes     Quit date: 2/6/2017   • Smokeless tobacco: Never Used   • Alcohol use No      Comment: \"used to drink a lot\" none now  (per daughter)       CURRENT MEDICATION:    Current Facility-Administered Medications:   •  lactated ringers infusion, 125 mL/hr, Intravenous, Continuous, Ata Cardoza MD  •  sodium chloride 0.9 % flush 1-10 mL, 1-10 mL, Intravenous, PRN, Ata Cardoza MD     I have reviewed her list of current " "medications.    ALLERGIES:  Review of patient's allergies indicates no known allergies.    VISIT VITALS:  Visit Vitals   • Pulse 93   • Temp 98.1 °F (36.7 °C) (Tympanic)   • Resp 20   • Ht 67\" (170.2 cm)   • Wt 167 lb (75.8 kg)   • LMP  (LMP Unknown)   • SpO2 100%   • BMI 26.16 kg/m2       PHYSICAL EXAMINATION:  Physical Exam   Alert, oriented ×3  HEENT: Normal  Neck: No mass  Chest: Clear  Heart: Regular rhythm  Abdomen: Soft, nontender, active BS  Extremities: No edema  Neuro: No focal deficit    Assessment/Plan   Unexplained anemia, suspected GI blood loss  Colon cancer screening    REC  EGD and screening/surveillance colonoscopy are planned.  The procedures, benefits, risks and alternatives have been discussed with the patient.         Suraj Quintanilla III, MD  "

## 2017-02-24 NOTE — OP NOTE
02/24/17    ESOPHAGOGASTRODUODENOSCOPY, COLONOSCOPY with polypectomy  Procedure Note    Sunshine Her  2/24/2017    Pre-op Diagnosis: Anemia, GI blood loss (suspected), colon cancer screening, last colonoscopy was performed about 2 years ago      Post-op Diagnosis:   -Normal EGD  -Diverticulosis, sigmoid, mild  -Colonic polyps, described below        Anesthesia: Per Anesthesia service, general anesthesia      Estimated Blood Loss: Negligible      Findings: EGD was performed with careful inspection of the esophagus, stomach and duodenum to the fourth portion.  All areas appeared normal.    Normal rectal examination.  Colonoscopy was performed to the cecum, confirmed by identification of typical cecal anatomy.  Bowel preparation was adequate.  Scope was retroflexed within the rectum.  A few small diverticula were seen in the sigmoid colon.  There was no evidence of diverticulitis.  I found and removed 2 small benign-appearing sessile polyps from the proximal transverse colon and 2 small benign-appearing sessile polyps from the ascending colon--all of the polyps were between 5-10 millimeters in size.  All polyps were removed using the cold snare.  No other abnormality was seen.    She tolerated the examinations very well and there were no immediate complications.  She left the OR in stable and satisfactory condition.      Complications: None      Recommendations:   Findings discussed with the patient  Await biopsy findings  Repeat screening/surveillance colonoscopy in about 3 years      Suraj Quintanilla III, MD     Date: 2/24/2017  Time: 1:31 PM

## 2017-02-24 NOTE — PLAN OF CARE
Problem: Patient Care Overview (Adult)  Goal: Plan of Care Review  Outcome: Ongoing (interventions implemented as appropriate)    02/24/17 1114   Coping/Psychosocial Response Interventions   Plan Of Care Reviewed With patient   Patient Care Overview   Progress progress toward functional goals as expected

## 2017-02-24 NOTE — PLAN OF CARE
Problem: GI Endoscopy (Adult)  Goal: Signs and Symptoms of Listed Potential Problems Will be Absent or Manageable (GI Endoscopy)  Outcome: Ongoing (interventions implemented as appropriate)    02/24/17 1115   GI Endoscopy   Problems Assessed (GI Endoscopy) all   Problems Present (GI Endoscopy) bleeding

## 2017-02-24 NOTE — ANESTHESIA POSTPROCEDURE EVALUATION
Patient: Sunshine Her    Procedure Summary     Date Anesthesia Start Anesthesia Stop Room / Location    02/24/17 1247 1330 [facility] / [facility]       Procedure Diagnosis Surgeon Provider    ESOPHAGOGASTRODUODENOSCOPY WITH BIOPSY  CPTCODE:31323 (N/A Esophagus); COLONOSCOPY  CPTCODE:11687 (N/A ) Colon cancer screening; Abdominal pain, unspecified location  (Colon cancer screening [Z12.11]; Abdominal pain, unspecified location [R10.9]) MD Ata Tony III, MD          Anesthesia Type: general  Last vitals  /97 (02/24/17 1400)    Temp      Pulse 76 (02/24/17 1400)   Resp 18 (02/24/17 1400)    SpO2 98 % (02/24/17 1400)      Post Anesthesia Care and Evaluation    Patient location during evaluation: PHASE II  Patient participation: complete - patient participated  Level of consciousness: awake and alert  Pain score: 1  Pain management: adequate  Airway patency: patent  Anesthetic complications: No anesthetic complications  PONV Status: controlled  Cardiovascular status: acceptable  Respiratory status: acceptable  Hydration status: acceptable

## 2017-02-28 ENCOUNTER — OFFICE VISIT (OUTPATIENT)
Dept: PSYCHIATRY | Facility: CLINIC | Age: 72
End: 2017-02-28

## 2017-02-28 VITALS
WEIGHT: 175 LBS | BODY MASS INDEX: 27.47 KG/M2 | DIASTOLIC BLOOD PRESSURE: 67 MMHG | HEART RATE: 87 BPM | SYSTOLIC BLOOD PRESSURE: 150 MMHG | HEIGHT: 67 IN

## 2017-02-28 DIAGNOSIS — F31.32 BIPOLAR AFFECTIVE DISORDER, CURRENTLY DEPRESSED, MODERATE (HCC): ICD-10-CM

## 2017-02-28 LAB
LAB AP CASE REPORT: NORMAL
Lab: NORMAL
PATH REPORT.FINAL DX SPEC: NORMAL

## 2017-02-28 PROCEDURE — 99213 OFFICE O/P EST LOW 20 MIN: CPT | Performed by: NURSE PRACTITIONER

## 2017-02-28 RX ORDER — DULOXETIN HYDROCHLORIDE 60 MG/1
60 CAPSULE, DELAYED RELEASE ORAL 2 TIMES DAILY
Qty: 60 CAPSULE | Refills: 1 | Status: SHIPPED | OUTPATIENT
Start: 2017-02-28 | End: 2017-04-21 | Stop reason: SDUPTHER

## 2017-02-28 RX ORDER — BUPROPION HYDROCHLORIDE 300 MG/1
300 TABLET ORAL EVERY MORNING
Qty: 30 TABLET | Refills: 1 | Status: SHIPPED | OUTPATIENT
Start: 2017-02-28 | End: 2017-04-21 | Stop reason: SDUPTHER

## 2017-02-28 RX ORDER — QUETIAPINE FUMARATE 200 MG/1
200 TABLET, FILM COATED ORAL NIGHTLY
Qty: 30 TABLET | Refills: 1 | Status: SHIPPED | OUTPATIENT
Start: 2017-02-28 | End: 2017-04-21 | Stop reason: SDUPTHER

## 2017-02-28 RX ORDER — MIRTAZAPINE 15 MG/1
15 TABLET, FILM COATED ORAL NIGHTLY
Qty: 30 TABLET | Refills: 1 | Status: SHIPPED | OUTPATIENT
Start: 2017-02-28 | End: 2017-04-21 | Stop reason: SDUPTHER

## 2017-02-28 NOTE — PROGRESS NOTES
Stacy Her is a 71 y.o. female who is here today for medication management follow up at the [facility] [address], she presents with her daughter with whom she gives permission to speak openly in front of.  She presents to her appointment on time.      Chief Complaint:  Follow-up     History of Present Illness            She states that she is doing well with her medications, denies any SE.  She states that she is not able to sleep because of racing thoughts, been picking at herself with anxiety.  She shares that she is sleeping less than about 6 hours per night with no NM.  She feels slightly, denies any feelings of being hyper, however she feels in a good mood and noted to be rapidly speaking.  She states that she has been worrying more lately, especially since last stroke.  She states that she is eating well, noted to have gained some weight.  She denies any new stressors but feels jittery.  She denies any new health issues.  Denies any AV hallucinations, denies any SI/HI.  Sh was previously on depakote but taken off because of increased sedation, was on it twice, will take at night.      The following portions of the patient's history were reviewed and updated as appropriate: allergies, current medications, past family history, past medical history, past social history, past surgical history and problem list.    Review of Systems   Constitutional: Negative for appetite change, chills, diaphoresis, fatigue, fever and unexpected weight change.   HENT: Negative for hearing loss, sore throat, trouble swallowing and voice change.    Eyes: Negative for photophobia and visual disturbance.   Respiratory: Negative for cough, chest tightness and shortness of breath.    Cardiovascular: Negative for chest pain and palpitations.   Gastrointestinal: Negative for abdominal pain, constipation, nausea and vomiting.   Endocrine: Negative for cold intolerance and heat intolerance.   Genitourinary: Negative for dysuria and  "frequency.   Musculoskeletal: Positive for gait problem. Negative for arthralgias, back pain, joint swelling and neck stiffness.   Skin: Negative for color change and wound.   Allergic/Immunologic: Negative for environmental allergies and immunocompromised state.   Neurological: Negative for dizziness, tremors, seizures, syncope, weakness, light-headedness and headaches.   Hematological: Negative for adenopathy. Does not bruise/bleed easily.       Objective   Physical Exam   Constitutional: She appears well-developed and well-nourished. No distress.   Neurological: She is alert. Coordination and gait normal.   Vitals reviewed.    Blood pressure 150/67, pulse 87, height 67\" (170.2 cm), weight 175 lb (79.4 kg).    No Known Allergies    Current Medications:   Current Outpatient Prescriptions   Medication Sig Dispense Refill   • acetaminophen (TYLENOL) 325 MG tablet Take 2 tablets by mouth Every 4 (Four) Hours As Needed for mild pain (1-3) or fever (Temperature greater than or equal to 37 C).  0   • amLODIPine (NORVASC) 5 MG tablet Take 1 tablet by mouth Daily. 30 tablet 0   • aspirin 81 MG chewable tablet Chew 1 tablet Daily.     • atorvastatin (LIPITOR) 40 MG tablet Take 1 tablet by mouth Daily. (Patient taking differently: Take 40 mg by mouth Every Night.)     • buPROPion XL (WELLBUTRIN XL) 300 MG 24 hr tablet Take 1 tablet by mouth Every Morning. 30 tablet 1   • donepezil (ARICEPT) 10 MG tablet Take 1 tablet by mouth Daily. 30 tablet 11   • DULoxetine (CYMBALTA) 60 MG capsule Take 1 capsule by mouth 2 (Two) Times a Day. 60 capsule 1   • ferrous sulfate (IRON SUPPLEMENT) 325 (65 FE) MG tablet Take 1 tablet by mouth 2 (Two) Times a Day With Meals. 60 tablet 0   • levothyroxine (SYNTHROID, LEVOTHROID) 25 MCG tablet Take 25 mcg by mouth daily.     • memantine (NAMENDA) 10 MG tablet Take 1 tablet by mouth 2 (two) times a day. 60 tablet 1   • pantoprazole (PROTONIX) 40 MG EC tablet Take 1 tablet by mouth 2 (Two) Times a " Day Before Meals. 60 tablet 0   • QUEtiapine (SEROquel) 200 MG tablet Take 1 tablet by mouth Every Night. 30 tablet 1   • fluticasone-salmeterol (ADVAIR) 100-50 MCG/DOSE DISKUS Inhale 1 puff 2 (Two) Times a Day. Patients family states they are not sure this is the right mg but her pharmacy is [facility].     • mirtazapine (REMERON) 15 MG tablet Take 1 tablet by mouth Every Night. 30 tablet 1     No current facility-administered medications for this visit.        Mental Status Exam:   Hygiene:   good  Cooperation:  Cooperative  Eye Contact:  Fair  Psychomotor Behavior:  Appropriate  Affect:  Appropriate  Hopelessness: Denies  Speech:  Normal  Thought Process:  Goal directed  Thought Content:  Normal  Suicidal:  None  Homicidal:  None  Hallucinations:  None  Delusion:  None  Memory:  Intact  Orientation:  Person, Place, Time and Situation  Reliability:  fair  Insight:  Fair  Judgement:  Fair  Impulse Control:  Fair  Physical/Medical Issues:  No     Assessment/Plan   Bipolar I disorder, most recent episode depressed    Anxiety disorder, unspecified type    Dementia without behavioral disturbance, unspecified dementia type    Other orders  -     buPROPion XL (WELLBUTRIN XL) 300 MG 24 hr tablet; Take 1 tablet by mouth Every Morning.  -     DULoxetine (CYMBALTA) 60 MG capsule; Take 1 capsule by mouth 2 (Two) Times a Day.  -     QUEtiapine 200mg:  Take 1 tablet by mouth Every Night.  -     Remeron 15mg at bedtime for sleep    Discused medications options.  Begin remeron 15mg qhs, increase seroquel 200mg; continue wellbutrin, cymbalta- discontinue depakote   Discussed the risks, beneefits, and side effects of the medications; patient ackowledged and verbally consented.  Patient is aware to call the [facility] with any worsening of symptoms.  Patient is agreeable to call 911 or go to the nearest ER should he/she begin having SI/HI.    Return in 8 weeks

## 2017-04-07 ENCOUNTER — LAB (OUTPATIENT)
Dept: LAB | Facility: HOSPITAL | Age: 72
End: 2017-04-07

## 2017-04-07 ENCOUNTER — TRANSCRIBE ORDERS (OUTPATIENT)
Dept: ADMINISTRATIVE | Facility: HOSPITAL | Age: 72
End: 2017-04-07

## 2017-04-07 DIAGNOSIS — E87.5 HYPERPOTASSEMIA: ICD-10-CM

## 2017-04-07 DIAGNOSIS — E87.5 HYPERPOTASSEMIA: Primary | ICD-10-CM

## 2017-04-07 LAB — POTASSIUM BLD-SCNC: 5 MMOL/L (ref 3.5–5.3)

## 2017-04-07 PROCEDURE — 36415 COLL VENOUS BLD VENIPUNCTURE: CPT

## 2017-04-07 PROCEDURE — 84132 ASSAY OF SERUM POTASSIUM: CPT | Performed by: NURSE PRACTITIONER

## 2017-04-21 DIAGNOSIS — F31.32 BIPOLAR AFFECTIVE DISORDER, CURRENTLY DEPRESSED, MODERATE (HCC): ICD-10-CM

## 2017-04-24 RX ORDER — BUPROPION HYDROCHLORIDE 300 MG/1
300 TABLET ORAL EVERY MORNING
Qty: 30 TABLET | Refills: 1 | Status: SHIPPED | OUTPATIENT
Start: 2017-04-24 | End: 2017-06-01 | Stop reason: SDUPTHER

## 2017-04-24 RX ORDER — MIRTAZAPINE 15 MG/1
15 TABLET, FILM COATED ORAL NIGHTLY
Qty: 30 TABLET | Refills: 1 | Status: SHIPPED | OUTPATIENT
Start: 2017-04-24 | End: 2017-06-01 | Stop reason: ALTCHOICE

## 2017-04-24 RX ORDER — DULOXETIN HYDROCHLORIDE 60 MG/1
60 CAPSULE, DELAYED RELEASE ORAL 2 TIMES DAILY
Qty: 60 CAPSULE | Refills: 1 | Status: SHIPPED | OUTPATIENT
Start: 2017-04-24 | End: 2017-06-01 | Stop reason: SDUPTHER

## 2017-04-24 RX ORDER — QUETIAPINE FUMARATE 200 MG/1
200 TABLET, FILM COATED ORAL NIGHTLY
Qty: 30 TABLET | Refills: 1 | Status: SHIPPED | OUTPATIENT
Start: 2017-04-24 | End: 2017-06-01 | Stop reason: SDUPTHER

## 2017-06-01 ENCOUNTER — OFFICE VISIT (OUTPATIENT)
Dept: PSYCHIATRY | Facility: CLINIC | Age: 72
End: 2017-06-01

## 2017-06-01 VITALS
SYSTOLIC BLOOD PRESSURE: 157 MMHG | DIASTOLIC BLOOD PRESSURE: 72 MMHG | BODY MASS INDEX: 28.56 KG/M2 | HEIGHT: 67 IN | WEIGHT: 182 LBS | HEART RATE: 98 BPM

## 2017-06-01 DIAGNOSIS — F03.90 DEMENTIA WITHOUT BEHAVIORAL DISTURBANCE, UNSPECIFIED DEMENTIA TYPE: ICD-10-CM

## 2017-06-01 DIAGNOSIS — F41.9 ANXIETY DISORDER, UNSPECIFIED TYPE: Primary | ICD-10-CM

## 2017-06-01 DIAGNOSIS — F31.32 BIPOLAR AFFECTIVE DISORDER, CURRENTLY DEPRESSED, MODERATE (HCC): ICD-10-CM

## 2017-06-01 PROCEDURE — 99213 OFFICE O/P EST LOW 20 MIN: CPT | Performed by: NURSE PRACTITIONER

## 2017-06-01 RX ORDER — TOBRAMYCIN 3 MG/ML
SOLUTION/ DROPS OPHTHALMIC
Refills: 1 | Status: ON HOLD | COMMUNITY
Start: 2017-03-29 | End: 2017-06-28

## 2017-06-01 RX ORDER — DULOXETIN HYDROCHLORIDE 60 MG/1
60 CAPSULE, DELAYED RELEASE ORAL 2 TIMES DAILY
Qty: 60 CAPSULE | Refills: 1 | Status: ON HOLD | OUTPATIENT
Start: 2017-06-01 | End: 2017-06-28

## 2017-06-01 RX ORDER — BUPROPION HYDROCHLORIDE 150 MG/1
150 TABLET ORAL EVERY MORNING
Qty: 30 TABLET | Refills: 1 | Status: ON HOLD | OUTPATIENT
Start: 2017-06-01 | End: 2017-06-28

## 2017-06-01 RX ORDER — SERTRALINE HYDROCHLORIDE 25 MG/1
25 TABLET, FILM COATED ORAL DAILY
Qty: 30 TABLET | Refills: 1 | Status: SHIPPED | OUTPATIENT
Start: 2017-06-01 | End: 2017-06-30 | Stop reason: HOSPADM

## 2017-06-01 RX ORDER — TRIPROLIDINE/PSEUDOEPHEDRINE 2.5MG-60MG
TABLET ORAL
Refills: 1 | COMMUNITY
Start: 2017-03-29 | End: 2017-06-01

## 2017-06-01 RX ORDER — HYDROXYZINE HYDROCHLORIDE 10 MG/1
TABLET, FILM COATED ORAL
Qty: 60 TABLET | Refills: 1 | Status: ON HOLD | OUTPATIENT
Start: 2017-06-01 | End: 2017-06-28

## 2017-06-01 RX ORDER — QUETIAPINE FUMARATE 200 MG/1
200 TABLET, FILM COATED ORAL NIGHTLY
Qty: 30 TABLET | Refills: 1 | Status: ON HOLD | OUTPATIENT
Start: 2017-06-01 | End: 2017-06-28

## 2017-06-01 NOTE — PROGRESS NOTES
Subjective   Sunshine Her is a 71 y.o. female who is here today for medication management follow up at the [facility], she presents with her daughter with whom she gives permission to speak openly in front of.  She presents to her appointment on time.      Chief Complaint:  Follow-up     History of Present Illness  She states that she has been doing pretty good, she denies any side effects from the medications.  Family is concerned about the wellbutrin causing increased anxiety, she has been noted to be scratching her chest, legs and back.  She states that she has stopped smoking now for 3 months and has gained about 7 pounds.  This could be related to her increased anxiety.  She states that she has had a lot of depression because she has been worried about things.  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  Her appetite has increased with her stopping smoking.  She denies any new stressors or health concerns.  She denies AV hallucinations, denies any SI/HI.  Begin taper of Wellbutrin, start Zoloft and low dose of atarax for itching, made aware of risk of increased dizziness.         The following portions of the patient's history were reviewed and updated as appropriate: allergies, current medications, past family history, past medical history, past social history, past surgical history and problem list.    Review of Systems   Constitutional: Negative for appetite change, chills, diaphoresis, fatigue, fever and unexpected weight change.   HENT: Negative for hearing loss, sore throat, trouble swallowing and voice change.    Eyes: Negative for photophobia and visual disturbance.   Respiratory: Negative for cough, chest tightness and shortness of breath.    Cardiovascular: Negative for chest pain and palpitations.   Gastrointestinal: Negative for abdominal pain,  "constipation, nausea and vomiting.   Endocrine: Negative for cold intolerance and heat intolerance.   Genitourinary: Negative for dysuria and frequency.   Musculoskeletal: Positive for gait problem. Negative for arthralgias, back pain, joint swelling and neck stiffness.   Skin: Negative for color change and wound.   Allergic/Immunologic: Negative for environmental allergies and immunocompromised state.   Neurological: Negative for dizziness, tremors, seizures, syncope, weakness, light-headedness and headaches.   Hematological: Negative for adenopathy. Does not bruise/bleed easily.       Objective   Physical Exam   Constitutional: She appears well-developed and well-nourished. No distress.   Neurological: She is alert. Coordination and gait normal.   Vitals reviewed.    Blood pressure 157/72, pulse 98, height 67\" (170.2 cm), weight 182 lb (82.6 kg).    No Known Allergies    Current Medications:   Current Outpatient Prescriptions   Medication Sig Dispense Refill   • acetaminophen (TYLENOL) 325 MG tablet Take 2 tablets by mouth Every 4 (Four) Hours As Needed for mild pain (1-3) or fever (Temperature greater than or equal to 37 C).  0   • amLODIPine (NORVASC) 5 MG tablet Take 1 tablet by mouth Daily. 30 tablet 0   • aspirin 81 MG chewable tablet Chew 1 tablet Daily.     • atorvastatin (LIPITOR) 40 MG tablet Take 1 tablet by mouth Daily. (Patient taking differently: Take 40 mg by mouth Every Night.)     • buPROPion XL (WELLBUTRIN XL) 300 MG 24 hr tablet Take 1 tablet by mouth Every Morning. 30 tablet 1   • donepezil (ARICEPT) 10 MG tablet Take 1 tablet by mouth Daily. 30 tablet 11   • DULoxetine (CYMBALTA) 60 MG capsule Take 1 capsule by mouth 2 (Two) Times a Day. 60 capsule 1   • ferrous sulfate (IRON SUPPLEMENT) 325 (65 FE) MG tablet Take 1 tablet by mouth 2 (Two) Times a Day With Meals. 60 tablet 0   • levothyroxine (SYNTHROID, LEVOTHROID) 25 MCG tablet Take 25 mcg by mouth daily.     • memantine (NAMENDA) 10 MG " tablet Take 1 tablet by mouth 2 (two) times a day. 60 tablet 1   • pantoprazole (PROTONIX) 40 MG EC tablet Take 1 tablet by mouth 2 (Two) Times a Day Before Meals. 60 tablet 0   • QUEtiapine (SEROquel) 200 MG tablet Take 1 tablet by mouth Every Night. 30 tablet 1   • tobramycin 0.3 % solution ophthalmic solution   1     No current facility-administered medications for this visit.        Mental Status Exam:   Hygiene:   good  Cooperation:  Cooperative  Eye Contact:  Fair  Psychomotor Behavior:  Appropriate  Affect:  Appropriate  Hopelessness: Denies  Speech:  Normal  Thought Process:  Goal directed  Thought Content:  Normal  Suicidal:  None  Homicidal:  None  Hallucinations:  None  Delusion:  None  Memory:  Intact  Orientation:  Person, Place, Time and Situation  Reliability:  fair  Insight:  Fair  Judgement:  Fair  Impulse Control:  Fair  Physical/Medical Issues:  No     Assessment/Plan   Bipolar I disorder, most recent episode depressed    Anxiety disorder, unspecified type    Dementia without behavioral disturbance, unspecified dementia type    Other orders  -     Zoloft 25mg daily   -     DULoxetine (CYMBALTA) 60 MG capsule; Take 1 capsule by mouth 2 (Two) Times a Day.  -     QUEtiapine 200mg:  Take 1 tablet by mouth Every Night.  -     Atarax 10mg qhs, may take 1 tab daily prn for anxiety    Discused medications options.  Continue remeron 15mg qhs, seroquel 200mg and cymbalta; taper wellbutrin as it may be causing increased anxiety, begin zoloft for anxiety and depression, add atarax for anxiety/itching- Discussed the risks, beneefits, and side effects of the medications; patient ackowledged and verbally consented.  Patient is aware to call the [facility] with any worsening of symptoms.  Patient is agreeable to call 911 or go to the nearest ER should he/she begin having SI/HI.    Return in 5 weeks

## 2017-06-26 ENCOUNTER — HOSPITAL ENCOUNTER (EMERGENCY)
Facility: HOSPITAL | Age: 72
Discharge: HOME | End: 2017-06-26
Attending: FAMILY MEDICINE | Admitting: FAMILY MEDICINE

## 2017-06-26 ENCOUNTER — APPOINTMENT (OUTPATIENT)
Dept: GENERAL RADIOLOGY | Facility: HOSPITAL | Age: 72
End: 2017-06-26

## 2017-06-26 VITALS
HEIGHT: 67 IN | RESPIRATION RATE: 18 BRPM | OXYGEN SATURATION: 98 % | BODY MASS INDEX: 26.21 KG/M2 | DIASTOLIC BLOOD PRESSURE: 70 MMHG | TEMPERATURE: 98.1 F | HEART RATE: 70 BPM | SYSTOLIC BLOOD PRESSURE: 148 MMHG | WEIGHT: 167 LBS

## 2017-06-26 DIAGNOSIS — K94.23 MALFUNCTION OF PERCUTANEOUS ENDOSCOPIC GASTROSTOMY (PEG) TUBE (HCC): Primary | ICD-10-CM

## 2017-06-26 PROCEDURE — 74000 XR ABDOMEN KUB: CPT | Performed by: RADIOLOGY

## 2017-06-26 PROCEDURE — 43760 PR CHANGE GASTROSTOMY TUBE PERCUTANEOUS W/O GUIDE: CPT | Performed by: SURGERY

## 2017-06-26 PROCEDURE — 99284 EMERGENCY DEPT VISIT MOD MDM: CPT | Performed by: SURGERY

## 2017-06-26 RX ORDER — LACTULOSE 10 G/15ML
30 SOLUTION ORAL 2 TIMES DAILY PRN
Status: ON HOLD | COMMUNITY
End: 2017-06-28

## 2017-06-26 RX ORDER — SODIUM CHLORIDE 9 MG/ML
125 INJECTION, SOLUTION INTRAVENOUS CONTINUOUS
Status: DISCONTINUED | OUTPATIENT
Start: 2017-06-26 | End: 2017-06-26 | Stop reason: HOSPADM

## 2017-06-26 RX ORDER — LISINOPRIL 20 MG/1
20 TABLET ORAL DAILY
COMMUNITY
End: 2017-06-30 | Stop reason: HOSPADM

## 2017-06-26 RX ORDER — MEPERIDINE HYDROCHLORIDE 25 MG/ML
12.5 INJECTION INTRAMUSCULAR; INTRAVENOUS; SUBCUTANEOUS ONCE
Status: COMPLETED | OUTPATIENT
Start: 2017-06-26 | End: 2017-06-26

## 2017-06-26 RX ORDER — LIDOCAINE HYDROCHLORIDE 10 MG/ML
INJECTION, SOLUTION INFILTRATION; PERINEURAL
Status: DISCONTINUED
Start: 2017-06-26 | End: 2017-06-26 | Stop reason: HOSPADM

## 2017-06-26 RX ORDER — GUAIFENESIN 600 MG/1
1200 TABLET, EXTENDED RELEASE ORAL 2 TIMES DAILY
COMMUNITY
End: 2017-06-28

## 2017-06-26 RX ORDER — SODIUM CHLORIDE 0.9 % (FLUSH) 0.9 %
10 SYRINGE (ML) INJECTION AS NEEDED
Status: DISCONTINUED | OUTPATIENT
Start: 2017-06-26 | End: 2017-06-26 | Stop reason: HOSPADM

## 2017-06-26 RX ORDER — ONDANSETRON 2 MG/ML
4 INJECTION INTRAMUSCULAR; INTRAVENOUS ONCE
Status: COMPLETED | OUTPATIENT
Start: 2017-06-26 | End: 2017-06-26

## 2017-06-26 RX ADMIN — ONDANSETRON 4 MG: 2 INJECTION, SOLUTION INTRAMUSCULAR; INTRAVENOUS at 14:21

## 2017-06-26 RX ADMIN — MEPERIDINE HYDROCHLORIDE 12.5 MG: 25 INJECTION, SOLUTION INTRAMUSCULAR; INTRAVENOUS; SUBCUTANEOUS at 14:08

## 2017-06-26 RX ADMIN — SODIUM CHLORIDE 125 ML/HR: 9 INJECTION, SOLUTION INTRAVENOUS at 13:21

## 2017-06-27 ENCOUNTER — HOSPITAL ENCOUNTER (INPATIENT)
Facility: HOSPITAL | Age: 72
LOS: 2 days | Discharge: NURSING, REHAB OR LONG-TERM CARE | End: 2017-06-30
Attending: EMERGENCY MEDICINE | Admitting: INTERNAL MEDICINE

## 2017-06-27 ENCOUNTER — APPOINTMENT (OUTPATIENT)
Dept: CT IMAGING | Facility: HOSPITAL | Age: 72
End: 2017-06-27

## 2017-06-27 DIAGNOSIS — R42 VERTIGO: ICD-10-CM

## 2017-06-27 DIAGNOSIS — I10 MALIGNANT HYPERTENSION: ICD-10-CM

## 2017-06-27 DIAGNOSIS — Z74.09 IMPAIRED FUNCTIONAL MOBILITY, BALANCE, GAIT, AND ENDURANCE: ICD-10-CM

## 2017-06-27 DIAGNOSIS — N18.30 CKD (CHRONIC KIDNEY DISEASE), STAGE III (HCC): Chronic | ICD-10-CM

## 2017-06-27 DIAGNOSIS — K94.23 PEG TUBE MALFUNCTION (HCC): ICD-10-CM

## 2017-06-27 DIAGNOSIS — R00.1 SYMPTOMATIC BRADYCARDIA: ICD-10-CM

## 2017-06-27 DIAGNOSIS — I95.1 ORTHOSTASIS: ICD-10-CM

## 2017-06-27 DIAGNOSIS — N28.9 ACUTE ON CHRONIC RENAL INSUFFICIENCY: ICD-10-CM

## 2017-06-27 DIAGNOSIS — N39.0 URINARY TRACT INFECTION, SITE UNSPECIFIED: ICD-10-CM

## 2017-06-27 DIAGNOSIS — Z72.0 TOBACCO ABUSE: Chronic | ICD-10-CM

## 2017-06-27 DIAGNOSIS — R53.1 GENERALIZED WEAKNESS: ICD-10-CM

## 2017-06-27 DIAGNOSIS — N28.9 KIDNEY FUNCTION ABNORMAL: Chronic | ICD-10-CM

## 2017-06-27 DIAGNOSIS — N18.30 CHRONIC KIDNEY DISEASE (CKD), STAGE III (MODERATE) (HCC): ICD-10-CM

## 2017-06-27 DIAGNOSIS — N18.9 ACUTE ON CHRONIC RENAL INSUFFICIENCY: ICD-10-CM

## 2017-06-27 DIAGNOSIS — Z86.73 HISTORY OF STROKE: Chronic | ICD-10-CM

## 2017-06-27 DIAGNOSIS — A41.9 SEPSIS, DUE TO UNSPECIFIED ORGANISM: Primary | ICD-10-CM

## 2017-06-27 LAB
ALBUMIN SERPL-MCNC: 4.5 G/DL (ref 3.4–4.8)
ALBUMIN/GLOB SERPL: 1.2 G/DL (ref 1.5–2.5)
ALP SERPL-CCNC: 150 U/L (ref 35–104)
ALT SERPL W P-5'-P-CCNC: 26 U/L (ref 10–36)
ANION GAP SERPL CALCULATED.3IONS-SCNC: 8.3 MMOL/L (ref 3.6–11.2)
AST SERPL-CCNC: 29 U/L (ref 10–30)
BACTERIA UR QL AUTO: ABNORMAL /HPF
BASOPHILS # BLD AUTO: 0.04 10*3/MM3 (ref 0–0.3)
BASOPHILS NFR BLD AUTO: 0.2 % (ref 0–2)
BILIRUB SERPL-MCNC: 0.4 MG/DL (ref 0.2–1.8)
BILIRUB UR QL STRIP: NEGATIVE
BUN BLD-MCNC: 68 MG/DL (ref 7–21)
BUN/CREAT SERPL: 48.2 (ref 7–25)
CALCIUM SPEC-SCNC: 9.9 MG/DL (ref 7.7–10)
CHLORIDE SERPL-SCNC: 95 MMOL/L (ref 99–112)
CLARITY UR: ABNORMAL
CO2 SERPL-SCNC: 25.7 MMOL/L (ref 24.3–31.9)
COLOR UR: YELLOW
CREAT BLD-MCNC: 1.41 MG/DL (ref 0.43–1.29)
DEPRECATED RDW RBC AUTO: 45.6 FL (ref 37–54)
EOSINOPHIL # BLD AUTO: 0.03 10*3/MM3 (ref 0–0.7)
EOSINOPHIL NFR BLD AUTO: 0.2 % (ref 0–7)
ERYTHROCYTE [DISTWIDTH] IN BLOOD BY AUTOMATED COUNT: 13.9 % (ref 11.5–14.5)
GFR SERPL CREATININE-BSD FRML MDRD: 37 ML/MIN/1.73
GLOBULIN UR ELPH-MCNC: 3.8 GM/DL
GLUCOSE BLD-MCNC: 118 MG/DL (ref 70–110)
GLUCOSE UR STRIP-MCNC: NEGATIVE MG/DL
HCT VFR BLD AUTO: 30.3 % (ref 37–47)
HGB BLD-MCNC: 9.8 G/DL (ref 12–16)
HGB UR QL STRIP.AUTO: NEGATIVE
HYALINE CASTS UR QL AUTO: ABNORMAL /LPF
IMM GRANULOCYTES # BLD: 0.14 10*3/MM3 (ref 0–0.03)
IMM GRANULOCYTES NFR BLD: 0.7 % (ref 0–0.5)
KETONES UR QL STRIP: NEGATIVE
LEUKOCYTE ESTERASE UR QL STRIP.AUTO: ABNORMAL
LYMPHOCYTES # BLD AUTO: 1.31 10*3/MM3 (ref 1–3)
LYMPHOCYTES NFR BLD AUTO: 7 % (ref 16–46)
MCH RBC QN AUTO: 30.4 PG (ref 27–33)
MCHC RBC AUTO-ENTMCNC: 32.3 G/DL (ref 33–37)
MCV RBC AUTO: 94.1 FL (ref 80–94)
MONOCYTES # BLD AUTO: 1.51 10*3/MM3 (ref 0.1–0.9)
MONOCYTES NFR BLD AUTO: 8 % (ref 0–12)
NEUTROPHILS # BLD AUTO: 15.75 10*3/MM3 (ref 1.4–6.5)
NEUTROPHILS NFR BLD AUTO: 83.9 % (ref 40–75)
NITRITE UR QL STRIP: POSITIVE
OSMOLALITY SERPL CALC.SUM OF ELEC: 279.8 MOSM/KG (ref 273–305)
PH UR STRIP.AUTO: 6.5 [PH] (ref 5–8)
PLATELET # BLD AUTO: 397 10*3/MM3 (ref 130–400)
PMV BLD AUTO: 9.8 FL (ref 6–10)
POTASSIUM BLD-SCNC: 5.4 MMOL/L (ref 3.5–5.3)
PROT SERPL-MCNC: 8.3 G/DL (ref 6–8)
PROT UR QL STRIP: ABNORMAL
RBC # BLD AUTO: 3.22 10*6/MM3 (ref 4.2–5.4)
RBC # UR: ABNORMAL /HPF
REF LAB TEST METHOD: ABNORMAL
SODIUM BLD-SCNC: 129 MMOL/L (ref 135–153)
SP GR UR STRIP: 1.02 (ref 1–1.03)
SQUAMOUS #/AREA URNS HPF: ABNORMAL /HPF
UROBILINOGEN UR QL STRIP: ABNORMAL
WBC NRBC COR # BLD: 18.78 10*3/MM3 (ref 4.5–12.5)
WBC UR QL AUTO: ABNORMAL /HPF

## 2017-06-27 PROCEDURE — 87086 URINE CULTURE/COLONY COUNT: CPT | Performed by: PHYSICIAN ASSISTANT

## 2017-06-27 PROCEDURE — 87186 SC STD MICRODIL/AGAR DIL: CPT | Performed by: PHYSICIAN ASSISTANT

## 2017-06-27 PROCEDURE — 25010000002 MORPHINE PER 10 MG: Performed by: EMERGENCY MEDICINE

## 2017-06-27 PROCEDURE — 85025 COMPLETE CBC W/AUTO DIFF WBC: CPT | Performed by: PHYSICIAN ASSISTANT

## 2017-06-27 PROCEDURE — 81001 URINALYSIS AUTO W/SCOPE: CPT | Performed by: PHYSICIAN ASSISTANT

## 2017-06-27 PROCEDURE — 80053 COMPREHEN METABOLIC PANEL: CPT | Performed by: PHYSICIAN ASSISTANT

## 2017-06-27 PROCEDURE — 74176 CT ABD & PELVIS W/O CONTRAST: CPT

## 2017-06-27 PROCEDURE — 87077 CULTURE AEROBIC IDENTIFY: CPT | Performed by: PHYSICIAN ASSISTANT

## 2017-06-27 PROCEDURE — 99285 EMERGENCY DEPT VISIT HI MDM: CPT

## 2017-06-27 PROCEDURE — P9612 CATHETERIZE FOR URINE SPEC: HCPCS

## 2017-06-27 PROCEDURE — 74176 CT ABD & PELVIS W/O CONTRAST: CPT | Performed by: RADIOLOGY

## 2017-06-27 RX ORDER — SODIUM CHLORIDE 0.9 % (FLUSH) 0.9 %
10 SYRINGE (ML) INJECTION AS NEEDED
Status: DISCONTINUED | OUTPATIENT
Start: 2017-06-27 | End: 2017-06-30 | Stop reason: HOSPADM

## 2017-06-27 RX ORDER — MORPHINE SULFATE 2 MG/ML
1 INJECTION, SOLUTION INTRAMUSCULAR; INTRAVENOUS ONCE
Status: COMPLETED | OUTPATIENT
Start: 2017-06-27 | End: 2017-06-27

## 2017-06-27 RX ADMIN — SODIUM CHLORIDE 500 ML: 9 INJECTION, SOLUTION INTRAVENOUS at 23:03

## 2017-06-27 RX ADMIN — MORPHINE SULFATE 1 MG: 2 INJECTION, SOLUTION INTRAMUSCULAR; INTRAVENOUS at 23:03

## 2017-06-28 ENCOUNTER — ANESTHESIA EVENT (OUTPATIENT)
Dept: PERIOP | Facility: HOSPITAL | Age: 72
End: 2017-06-28

## 2017-06-28 ENCOUNTER — ANESTHESIA (OUTPATIENT)
Dept: PERIOP | Facility: HOSPITAL | Age: 72
End: 2017-06-28

## 2017-06-28 PROBLEM — A41.9 SEPSIS (HCC): Status: ACTIVE | Noted: 2017-06-28

## 2017-06-28 LAB
ANION GAP SERPL CALCULATED.3IONS-SCNC: 5.5 MMOL/L (ref 3.6–11.2)
BASOPHILS # BLD AUTO: 0.02 10*3/MM3 (ref 0–0.3)
BASOPHILS NFR BLD AUTO: 0.1 % (ref 0–2)
BUN BLD-MCNC: 57 MG/DL (ref 7–21)
BUN/CREAT SERPL: 41.3 (ref 7–25)
CALCIUM SPEC-SCNC: 9 MG/DL (ref 7.7–10)
CHLORIDE SERPL-SCNC: 103 MMOL/L (ref 99–112)
CK SERPL-CCNC: 232 U/L (ref 24–173)
CO2 SERPL-SCNC: 23.5 MMOL/L (ref 24.3–31.9)
CREAT BLD-MCNC: 1.38 MG/DL (ref 0.43–1.29)
CRP SERPL-MCNC: 11.59 MG/DL (ref 0–0.99)
D-LACTATE SERPL-SCNC: 1.1 MMOL/L (ref 0.5–2)
DEPRECATED RDW RBC AUTO: 46.1 FL (ref 37–54)
EOSINOPHIL # BLD AUTO: 0.06 10*3/MM3 (ref 0–0.7)
EOSINOPHIL NFR BLD AUTO: 0.4 % (ref 0–7)
ERYTHROCYTE [DISTWIDTH] IN BLOOD BY AUTOMATED COUNT: 14 % (ref 11.5–14.5)
GFR SERPL CREATININE-BSD FRML MDRD: 38 ML/MIN/1.73
GLUCOSE BLD-MCNC: 126 MG/DL (ref 70–110)
HCT VFR BLD AUTO: 26.4 % (ref 37–47)
HGB BLD-MCNC: 8.1 G/DL (ref 12–16)
IMM GRANULOCYTES # BLD: 0.07 10*3/MM3 (ref 0–0.03)
IMM GRANULOCYTES NFR BLD: 0.5 % (ref 0–0.5)
LYMPHOCYTES # BLD AUTO: 1.18 10*3/MM3 (ref 1–3)
LYMPHOCYTES NFR BLD AUTO: 8.6 % (ref 16–46)
MCH RBC QN AUTO: 29.3 PG (ref 27–33)
MCHC RBC AUTO-ENTMCNC: 30.7 G/DL (ref 33–37)
MCV RBC AUTO: 95.7 FL (ref 80–94)
MONOCYTES # BLD AUTO: 1 10*3/MM3 (ref 0.1–0.9)
MONOCYTES NFR BLD AUTO: 7.2 % (ref 0–12)
NEUTROPHILS # BLD AUTO: 11.47 10*3/MM3 (ref 1.4–6.5)
NEUTROPHILS NFR BLD AUTO: 83.2 % (ref 40–75)
OSMOLALITY SERPL CALC.SUM OF ELEC: 281.9 MOSM/KG (ref 273–305)
PLATELET # BLD AUTO: 324 10*3/MM3 (ref 130–400)
PMV BLD AUTO: 9.7 FL (ref 6–10)
POTASSIUM BLD-SCNC: 5 MMOL/L (ref 3.5–5.3)
RBC # BLD AUTO: 2.76 10*6/MM3 (ref 4.2–5.4)
SODIUM BLD-SCNC: 132 MMOL/L (ref 135–153)
WBC NRBC COR # BLD: 13.8 10*3/MM3 (ref 4.5–12.5)

## 2017-06-28 PROCEDURE — 25010000002 PROPOFOL 10 MG/ML EMULSION: Performed by: NURSE ANESTHETIST, CERTIFIED REGISTERED

## 2017-06-28 PROCEDURE — 99221 1ST HOSP IP/OBS SF/LOW 40: CPT | Performed by: SURGERY

## 2017-06-28 PROCEDURE — 25010000002 CEFTRIAXONE: Performed by: PHYSICIAN ASSISTANT

## 2017-06-28 PROCEDURE — 82550 ASSAY OF CK (CPK): CPT | Performed by: INTERNAL MEDICINE

## 2017-06-28 PROCEDURE — 86140 C-REACTIVE PROTEIN: CPT | Performed by: INTERNAL MEDICINE

## 2017-06-28 PROCEDURE — 25010000002 PIPERACILLIN-TAZOBACTAM: Performed by: PHYSICIAN ASSISTANT

## 2017-06-28 PROCEDURE — 94799 UNLISTED PULMONARY SVC/PX: CPT

## 2017-06-28 PROCEDURE — 94640 AIRWAY INHALATION TREATMENT: CPT

## 2017-06-28 PROCEDURE — 85025 COMPLETE CBC W/AUTO DIFF WBC: CPT | Performed by: INTERNAL MEDICINE

## 2017-06-28 PROCEDURE — 0DP63UZ REMOVAL OF FEEDING DEVICE FROM STOMACH, PERCUTANEOUS APPROACH: ICD-10-PCS | Performed by: INTERNAL MEDICINE

## 2017-06-28 PROCEDURE — 25010000002 PROPOFOL 1000 MG/ML EMULSION: Performed by: NURSE ANESTHETIST, CERTIFIED REGISTERED

## 2017-06-28 PROCEDURE — 43246 EGD PLACE GASTROSTOMY TUBE: CPT | Performed by: SURGERY

## 2017-06-28 PROCEDURE — 25010000002 MORPHINE PER 10 MG: Performed by: EMERGENCY MEDICINE

## 2017-06-28 PROCEDURE — 87040 BLOOD CULTURE FOR BACTERIA: CPT | Performed by: PHYSICIAN ASSISTANT

## 2017-06-28 PROCEDURE — 25010000002 VANCOMYCIN PER 500 MG: Performed by: PHYSICIAN ASSISTANT

## 2017-06-28 PROCEDURE — 80048 BASIC METABOLIC PNL TOTAL CA: CPT | Performed by: INTERNAL MEDICINE

## 2017-06-28 PROCEDURE — 0DH63UZ INSERTION OF FEEDING DEVICE INTO STOMACH, PERCUTANEOUS APPROACH: ICD-10-PCS | Performed by: INTERNAL MEDICINE

## 2017-06-28 PROCEDURE — 25010000002 PIPERACILLIN-TAZOBACTAM: Performed by: INTERNAL MEDICINE

## 2017-06-28 PROCEDURE — 99223 1ST HOSP IP/OBS HIGH 75: CPT | Performed by: INTERNAL MEDICINE

## 2017-06-28 PROCEDURE — 25010000002 ENOXAPARIN PER 10 MG: Performed by: SURGERY

## 2017-06-28 PROCEDURE — 83605 ASSAY OF LACTIC ACID: CPT | Performed by: PHYSICIAN ASSISTANT

## 2017-06-28 RX ORDER — PREDNISOLONE ACETATE 10 MG/ML
1 SUSPENSION/ DROPS OPHTHALMIC EVERY 6 HOURS SCHEDULED
Status: CANCELLED | OUTPATIENT
Start: 2017-06-28

## 2017-06-28 RX ORDER — MEPERIDINE HYDROCHLORIDE 25 MG/ML
12.5 INJECTION INTRAMUSCULAR; INTRAVENOUS; SUBCUTANEOUS
Status: DISCONTINUED | OUTPATIENT
Start: 2017-06-28 | End: 2017-06-28 | Stop reason: HOSPADM

## 2017-06-28 RX ORDER — ATORVASTATIN CALCIUM 40 MG/1
40 TABLET, FILM COATED ORAL NIGHTLY
Status: DISCONTINUED | OUTPATIENT
Start: 2017-06-28 | End: 2017-06-30 | Stop reason: HOSPADM

## 2017-06-28 RX ORDER — DIFLUPREDNATE OPHTHALMIC 0.5 MG/ML
1 EMULSION OPHTHALMIC 4 TIMES DAILY
COMMUNITY
End: 2017-06-30 | Stop reason: HOSPADM

## 2017-06-28 RX ORDER — FERROUS SULFATE 325(65) MG
325 TABLET ORAL DAILY
Status: CANCELLED | OUTPATIENT
Start: 2017-06-28

## 2017-06-28 RX ORDER — FENTANYL CITRATE 50 UG/ML
50 INJECTION, SOLUTION INTRAMUSCULAR; INTRAVENOUS
Status: DISCONTINUED | OUTPATIENT
Start: 2017-06-28 | End: 2017-06-28 | Stop reason: HOSPADM

## 2017-06-28 RX ORDER — MIRTAZAPINE 15 MG/1
30 TABLET, FILM COATED ORAL NIGHTLY
Status: DISCONTINUED | OUTPATIENT
Start: 2017-06-28 | End: 2017-06-30 | Stop reason: HOSPADM

## 2017-06-28 RX ORDER — LANSOPRAZOLE
30 KIT
Status: CANCELLED | OUTPATIENT
Start: 2017-06-29

## 2017-06-28 RX ORDER — MEMANTINE HYDROCHLORIDE 10 MG/1
10 TABLET ORAL EVERY 12 HOURS SCHEDULED
Status: DISCONTINUED | OUTPATIENT
Start: 2017-06-28 | End: 2017-06-30 | Stop reason: HOSPADM

## 2017-06-28 RX ORDER — ACETAMINOPHEN 500 MG
500 TABLET ORAL EVERY 4 HOURS PRN
COMMUNITY

## 2017-06-28 RX ORDER — DONEPEZIL HYDROCHLORIDE 5 MG/1
10 TABLET, FILM COATED ORAL NIGHTLY
Status: CANCELLED | OUTPATIENT
Start: 2017-06-28 | End: 2017-11-07

## 2017-06-28 RX ORDER — FERROUS SULFATE 325(65) MG
325 TABLET ORAL
Status: CANCELLED | OUTPATIENT
Start: 2017-06-28

## 2017-06-28 RX ORDER — MEMANTINE HYDROCHLORIDE 10 MG/1
10 TABLET ORAL EVERY 12 HOURS SCHEDULED
Status: CANCELLED | OUTPATIENT
Start: 2017-06-28

## 2017-06-28 RX ORDER — KETOROLAC TROMETHAMINE 5 MG/ML
1 SOLUTION OPHTHALMIC 4 TIMES DAILY
Status: DISCONTINUED | OUTPATIENT
Start: 2017-06-28 | End: 2017-06-28 | Stop reason: RX

## 2017-06-28 RX ORDER — ACETAMINOPHEN 325 MG/1
500 TABLET ORAL EVERY 4 HOURS PRN
Status: DISCONTINUED | OUTPATIENT
Start: 2017-06-28 | End: 2017-06-30 | Stop reason: HOSPADM

## 2017-06-28 RX ORDER — LEVOTHYROXINE SODIUM 0.03 MG/1
25 TABLET ORAL DAILY
Status: DISCONTINUED | OUTPATIENT
Start: 2017-06-28 | End: 2017-06-30 | Stop reason: HOSPADM

## 2017-06-28 RX ORDER — DICLOFENAC SODIUM 1 MG/ML
1 SOLUTION/ DROPS OPHTHALMIC 4 TIMES DAILY
Status: DISCONTINUED | OUTPATIENT
Start: 2017-06-28 | End: 2017-06-30

## 2017-06-28 RX ORDER — MIRTAZAPINE 30 MG/1
30 TABLET, FILM COATED ORAL NIGHTLY
COMMUNITY

## 2017-06-28 RX ORDER — SODIUM CHLORIDE 9 MG/ML
INJECTION, SOLUTION INTRAVENOUS
Status: COMPLETED
Start: 2017-06-28 | End: 2017-06-28

## 2017-06-28 RX ORDER — GUAIFENESIN 200 MG/1
1200 TABLET ORAL 3 TIMES DAILY
Status: CANCELLED | OUTPATIENT
Start: 2017-06-28

## 2017-06-28 RX ORDER — KETOROLAC TROMETHAMINE 5 MG/ML
1 SOLUTION OPHTHALMIC 4 TIMES DAILY
Status: CANCELLED | OUTPATIENT
Start: 2017-06-28

## 2017-06-28 RX ORDER — PANTOPRAZOLE SODIUM 40 MG/1
40 TABLET, DELAYED RELEASE ORAL
Status: DISCONTINUED | OUTPATIENT
Start: 2017-06-28 | End: 2017-06-30 | Stop reason: HOSPADM

## 2017-06-28 RX ORDER — GUAIFENESIN 400 MG/1
1200 TABLET ORAL EVERY 12 HOURS SCHEDULED
COMMUNITY
End: 2017-06-30 | Stop reason: HOSPADM

## 2017-06-28 RX ORDER — AMLODIPINE BESYLATE 5 MG/1
5 TABLET ORAL DAILY
Status: CANCELLED | OUTPATIENT
Start: 2017-06-28

## 2017-06-28 RX ORDER — PREDNISOLONE ACETATE 10 MG/ML
1 SUSPENSION/ DROPS OPHTHALMIC EVERY 6 HOURS SCHEDULED
Status: DISCONTINUED | OUTPATIENT
Start: 2017-06-28 | End: 2017-06-30

## 2017-06-28 RX ORDER — SERTRALINE HYDROCHLORIDE 25 MG/1
25 TABLET, FILM COATED ORAL DAILY
Status: CANCELLED | OUTPATIENT
Start: 2017-06-28 | End: 2018-06-01

## 2017-06-28 RX ORDER — LIDOCAINE HYDROCHLORIDE 20 MG/ML
INJECTION, SOLUTION INFILTRATION; PERINEURAL AS NEEDED
Status: DISCONTINUED | OUTPATIENT
Start: 2017-06-28 | End: 2017-06-28 | Stop reason: SURG

## 2017-06-28 RX ORDER — ACETAMINOPHEN AND CODEINE PHOSPHATE 300; 30 MG/1; MG/1
1 TABLET ORAL EVERY 4 HOURS PRN
Status: CANCELLED | OUTPATIENT
Start: 2017-06-28

## 2017-06-28 RX ORDER — SODIUM CHLORIDE 0.9 % (FLUSH) 0.9 %
1-10 SYRINGE (ML) INJECTION AS NEEDED
Status: DISCONTINUED | OUTPATIENT
Start: 2017-06-28 | End: 2017-06-30 | Stop reason: HOSPADM

## 2017-06-28 RX ORDER — ACETAMINOPHEN AND CODEINE PHOSPHATE 300; 30 MG/1; MG/1
1 TABLET ORAL EVERY 4 HOURS PRN
Status: DISCONTINUED | OUTPATIENT
Start: 2017-06-28 | End: 2017-06-30 | Stop reason: HOSPADM

## 2017-06-28 RX ORDER — LISINOPRIL 10 MG/1
20 TABLET ORAL DAILY
Status: CANCELLED | OUTPATIENT
Start: 2017-06-28

## 2017-06-28 RX ORDER — LEVOTHYROXINE SODIUM 0.03 MG/1
25 TABLET ORAL DAILY
Status: CANCELLED | OUTPATIENT
Start: 2017-06-28

## 2017-06-28 RX ORDER — SERTRALINE HYDROCHLORIDE 25 MG/1
25 TABLET, FILM COATED ORAL DAILY
Status: DISCONTINUED | OUTPATIENT
Start: 2017-06-28 | End: 2017-06-30

## 2017-06-28 RX ORDER — ACETAMINOPHEN AND CODEINE PHOSPHATE 60; 300 MG/1; MG/1
1 TABLET ORAL EVERY 4 HOURS PRN
COMMUNITY
End: 2017-06-30 | Stop reason: HOSPADM

## 2017-06-28 RX ORDER — OXYCODONE HYDROCHLORIDE AND ACETAMINOPHEN 5; 325 MG/1; MG/1
1 TABLET ORAL ONCE AS NEEDED
Status: DISCONTINUED | OUTPATIENT
Start: 2017-06-28 | End: 2017-06-28 | Stop reason: HOSPADM

## 2017-06-28 RX ORDER — ONDANSETRON 2 MG/ML
4 INJECTION INTRAMUSCULAR; INTRAVENOUS ONCE AS NEEDED
Status: DISCONTINUED | OUTPATIENT
Start: 2017-06-28 | End: 2017-06-28 | Stop reason: HOSPADM

## 2017-06-28 RX ORDER — SODIUM CHLORIDE 9 MG/ML
INJECTION, SOLUTION INTRAVENOUS CONTINUOUS PRN
Status: DISCONTINUED | OUTPATIENT
Start: 2017-06-28 | End: 2017-06-28 | Stop reason: SURG

## 2017-06-28 RX ORDER — IPRATROPIUM BROMIDE AND ALBUTEROL SULFATE 2.5; .5 MG/3ML; MG/3ML
3 SOLUTION RESPIRATORY (INHALATION) EVERY 6 HOURS PRN
COMMUNITY
End: 2017-11-15 | Stop reason: ALTCHOICE

## 2017-06-28 RX ORDER — SODIUM CHLORIDE 0.9 % (FLUSH) 0.9 %
1-10 SYRINGE (ML) INJECTION AS NEEDED
Status: DISCONTINUED | OUTPATIENT
Start: 2017-06-28 | End: 2017-06-28 | Stop reason: HOSPADM

## 2017-06-28 RX ORDER — ASPIRIN 81 MG/1
81 TABLET, CHEWABLE ORAL DAILY
Status: CANCELLED | OUTPATIENT
Start: 2017-06-28

## 2017-06-28 RX ORDER — ATORVASTATIN CALCIUM 40 MG/1
40 TABLET, FILM COATED ORAL NIGHTLY
Status: CANCELLED | OUTPATIENT
Start: 2017-06-28

## 2017-06-28 RX ORDER — MORPHINE SULFATE 2 MG/ML
1 INJECTION, SOLUTION INTRAMUSCULAR; INTRAVENOUS ONCE
Status: COMPLETED | OUTPATIENT
Start: 2017-06-28 | End: 2017-06-28

## 2017-06-28 RX ORDER — IPRATROPIUM BROMIDE AND ALBUTEROL SULFATE 2.5; .5 MG/3ML; MG/3ML
3 SOLUTION RESPIRATORY (INHALATION) ONCE AS NEEDED
Status: COMPLETED | OUTPATIENT
Start: 2017-06-28 | End: 2017-06-28

## 2017-06-28 RX ORDER — IPRATROPIUM BROMIDE AND ALBUTEROL SULFATE 2.5; .5 MG/3ML; MG/3ML
3 SOLUTION RESPIRATORY (INHALATION) EVERY 6 HOURS PRN
Status: CANCELLED | OUTPATIENT
Start: 2017-06-28

## 2017-06-28 RX ORDER — AMLODIPINE BESYLATE 5 MG/1
5 TABLET ORAL DAILY
Status: DISCONTINUED | OUTPATIENT
Start: 2017-06-28 | End: 2017-06-30 | Stop reason: HOSPADM

## 2017-06-28 RX ORDER — SODIUM CHLORIDE, SODIUM LACTATE, POTASSIUM CHLORIDE, CALCIUM CHLORIDE 600; 310; 30; 20 MG/100ML; MG/100ML; MG/100ML; MG/100ML
125 INJECTION, SOLUTION INTRAVENOUS CONTINUOUS
Status: DISCONTINUED | OUTPATIENT
Start: 2017-06-28 | End: 2017-06-28

## 2017-06-28 RX ORDER — IPRATROPIUM BROMIDE AND ALBUTEROL SULFATE 2.5; .5 MG/3ML; MG/3ML
3 SOLUTION RESPIRATORY (INHALATION) EVERY 6 HOURS PRN
Status: DISCONTINUED | OUTPATIENT
Start: 2017-06-28 | End: 2017-06-30 | Stop reason: HOSPADM

## 2017-06-28 RX ORDER — PROPOFOL 10 MG/ML
VIAL (ML) INTRAVENOUS AS NEEDED
Status: DISCONTINUED | OUTPATIENT
Start: 2017-06-28 | End: 2017-06-28 | Stop reason: SURG

## 2017-06-28 RX ORDER — ACETAMINOPHEN 325 MG/1
500 TABLET ORAL EVERY 4 HOURS PRN
Status: CANCELLED | OUTPATIENT
Start: 2017-06-28

## 2017-06-28 RX ORDER — MIRTAZAPINE 15 MG/1
30 TABLET, FILM COATED ORAL NIGHTLY
Status: CANCELLED | OUTPATIENT
Start: 2017-06-28

## 2017-06-28 RX ORDER — FERROUS SULFATE 325(65) MG
325 TABLET ORAL
Status: DISCONTINUED | OUTPATIENT
Start: 2017-06-29 | End: 2017-06-30 | Stop reason: HOSPADM

## 2017-06-28 RX ORDER — DONEPEZIL HYDROCHLORIDE 5 MG/1
10 TABLET, FILM COATED ORAL NIGHTLY
Status: DISCONTINUED | OUTPATIENT
Start: 2017-06-28 | End: 2017-06-30 | Stop reason: HOSPADM

## 2017-06-28 RX ORDER — ASPIRIN 81 MG/1
81 TABLET, CHEWABLE ORAL DAILY
Status: DISCONTINUED | OUTPATIENT
Start: 2017-06-28 | End: 2017-06-29

## 2017-06-28 RX ORDER — SODIUM CHLORIDE 9 MG/ML
50 INJECTION, SOLUTION INTRAVENOUS CONTINUOUS
Status: DISCONTINUED | OUTPATIENT
Start: 2017-06-28 | End: 2017-06-29

## 2017-06-28 RX ORDER — LISINOPRIL 10 MG/1
20 TABLET ORAL DAILY
Status: DISCONTINUED | OUTPATIENT
Start: 2017-06-28 | End: 2017-06-28

## 2017-06-28 RX ADMIN — ENOXAPARIN SODIUM 40 MG: 40 INJECTION SUBCUTANEOUS at 15:45

## 2017-06-28 RX ADMIN — MORPHINE SULFATE 1 MG: 2 INJECTION, SOLUTION INTRAMUSCULAR; INTRAVENOUS at 01:26

## 2017-06-28 RX ADMIN — IPRATROPIUM BROMIDE AND ALBUTEROL SULFATE 3 ML: .5; 3 SOLUTION RESPIRATORY (INHALATION) at 11:38

## 2017-06-28 RX ADMIN — PREDNISOLONE ACETATE 1 DROP: 10 SUSPENSION/ DROPS OPHTHALMIC at 17:54

## 2017-06-28 RX ADMIN — DONEPEZIL HYDROCHLORIDE 10 MG: 5 TABLET, FILM COATED ORAL at 21:58

## 2017-06-28 RX ADMIN — PANTOPRAZOLE SODIUM 40 MG: 40 TABLET, DELAYED RELEASE ORAL at 15:44

## 2017-06-28 RX ADMIN — DICLOFENAC SODIUM 1 DROP: 1 SOLUTION/ DROPS OPHTHALMIC at 21:58

## 2017-06-28 RX ADMIN — PROPOFOL 20 MG: 10 INJECTION, EMULSION INTRAVENOUS at 11:06

## 2017-06-28 RX ADMIN — LEVOTHYROXINE SODIUM 25 MCG: 25 TABLET ORAL at 15:44

## 2017-06-28 RX ADMIN — SODIUM CHLORIDE 1000 ML: 9 INJECTION, SOLUTION INTRAVENOUS at 01:26

## 2017-06-28 RX ADMIN — CEFTRIAXONE 1 G: 1 INJECTION, POWDER, FOR SOLUTION INTRAMUSCULAR; INTRAVENOUS at 23:57

## 2017-06-28 RX ADMIN — MEMANTINE HYDROCHLORIDE 10 MG: 10 TABLET ORAL at 21:58

## 2017-06-28 RX ADMIN — PREDNISOLONE ACETATE 1 DROP: 10 SUSPENSION/ DROPS OPHTHALMIC at 23:54

## 2017-06-28 RX ADMIN — VANCOMYCIN HYDROCHLORIDE 1500 MG: 5 INJECTION, POWDER, LYOPHILIZED, FOR SOLUTION INTRAVENOUS at 02:29

## 2017-06-28 RX ADMIN — MEMANTINE HYDROCHLORIDE 10 MG: 10 TABLET ORAL at 15:45

## 2017-06-28 RX ADMIN — PIPERACILLIN SODIUM,TAZOBACTAM SODIUM 3.38 G: 3; .375 INJECTION, POWDER, FOR SOLUTION INTRAVENOUS at 15:45

## 2017-06-28 RX ADMIN — PROPOFOL 100 MCG/KG/MIN: 10 INJECTION, EMULSION INTRAVENOUS at 11:06

## 2017-06-28 RX ADMIN — TAZOBACTAM SODIUM AND PIPERACILLIN SODIUM 4.5 G: .5; 4 INJECTION, POWDER, LYOPHILIZED, FOR SOLUTION INTRAVENOUS at 01:26

## 2017-06-28 RX ADMIN — ATORVASTATIN CALCIUM 40 MG: 40 TABLET, FILM COATED ORAL at 21:58

## 2017-06-28 RX ADMIN — SODIUM CHLORIDE: 9 INJECTION, SOLUTION INTRAVENOUS at 11:04

## 2017-06-28 RX ADMIN — ASPIRIN 81 MG: 81 TABLET, CHEWABLE ORAL at 15:44

## 2017-06-28 RX ADMIN — AMLODIPINE BESYLATE 5 MG: 5 TABLET ORAL at 15:44

## 2017-06-28 RX ADMIN — MIRTAZAPINE 30 MG: 15 TABLET, FILM COATED ORAL at 21:58

## 2017-06-28 RX ADMIN — LISINOPRIL 20 MG: 10 TABLET ORAL at 15:44

## 2017-06-28 RX ADMIN — SODIUM CHLORIDE 75 ML/HR: 9 INJECTION, SOLUTION INTRAVENOUS at 20:46

## 2017-06-28 RX ADMIN — LIDOCAINE HYDROCHLORIDE 30 MG: 20 INJECTION, SOLUTION INFILTRATION; PERINEURAL at 11:06

## 2017-06-28 RX ADMIN — SODIUM CHLORIDE 1000 ML: 9 INJECTION, SOLUTION INTRAVENOUS at 02:30

## 2017-06-28 RX ADMIN — SERTRALINE 25 MG: 25 TABLET, FILM COATED ORAL at 15:44

## 2017-06-28 RX ADMIN — GUAIFENESIN 400 MG: 100 SOLUTION ORAL at 15:45

## 2017-06-28 RX ADMIN — GUAIFENESIN 400 MG: 100 SOLUTION ORAL at 21:58

## 2017-06-28 RX ADMIN — PIPERACILLIN SODIUM,TAZOBACTAM SODIUM 3.38 G: 3; .375 INJECTION, POWDER, FOR SOLUTION INTRAVENOUS at 09:27

## 2017-06-28 RX ADMIN — CEFTRIAXONE 1 G: 1 INJECTION, POWDER, FOR SOLUTION INTRAMUSCULAR; INTRAVENOUS at 00:31

## 2017-06-28 NOTE — ANESTHESIA PREPROCEDURE EVALUATION
Anesthesia Evaluation     Patient summary reviewed and Nursing notes reviewed   no history of anesthetic complications:  NPO Solid Status: > 8 hours  NPO Liquid Status: > 8 hours     Airway   Mallampati: II  TM distance: >3 FB  Neck ROM: full  no difficulty expected  Dental - normal exam     Pulmonary - normal exam    breath sounds clear to auscultation  (+) a smoker Former, COPD,   Cardiovascular - normal exam  Exercise tolerance: poor (<4 METS)    ECG reviewed  Rhythm: regular  Rate: normal    (+) hypertension, CAD, dysrhythmias Atrial Fib, WILSON, PVD, hyperlipidemia      Neuro/Psych  (+) TIA, CVA, dizziness/light headedness, psychiatric history, dementia,    GI/Hepatic/Renal/Endo    (+)  GERD, hypothyroidism,     Musculoskeletal     (+) gait problem,   Abdominal  - normal exam    Bowel sounds: normal.   Substance History - negative use     OB/GYN negative ob/gyn ROS         Other   (+) arthritis         Phys Exam Other: Pt is somnolent.  Responsive to touch.                                Anesthesia Plan    ASA 4     general   total IV anesthesia  intravenous induction   Anesthetic plan and risks discussed with patient and child.    Plan discussed with CRNA.

## 2017-06-28 NOTE — ANESTHESIA POSTPROCEDURE EVALUATION
Patient: Sunshine Her    Procedure Summary     Date Anesthesia Start Anesthesia Stop Room / Location    06/28/17 1104 1121  [facility] OR 08 / [facility]       Procedure Diagnosis Surgeon Provider    ESOPHAGOGASTRODUODENOSCOPY WITH PERCUTANEOUS ENDOSCOPIC GASTROSTOMY TUBE INSERTION (N/A Esophagus) Malignant hypertension; Tobacco abuse; Vertigo; Kidney function abnormal; Orthostasis; Chronic kidney disease (CKD), stage III (moderate); CKD (chronic kidney disease), stage III; History of stroke; Acute on chronic renal insufficiency; Symptomatic bradycardia; PEG tube malfunction; Generalized weakness; Impaired functional mobility, balance, gait, and endurance; Urinary tract infection, site unspecified; Sepsis, due to unspecified organism  (Malignant hypertension [I10]; Tobacco abuse [Z72.0]; Vertigo [R42]; Kidney function abnormal [N28.9]; Orthostasis [I95.1]; Chronic kidney disease (CKD), stage III (moderate) [N18.3]; CKD (chronic kidney disease), stage III [N18.3]; History of stroke [Z86.73]; Acute on chronic renal insufficiency [N28.9, N18.9]; Symptomatic bradycardia [R00.1]; PEG tube malfunction [K94.23]; Generalized weakness [R53.1]; Impaired functional mobility, balance, gait, and endurance [Z74.09]; Urinary tract infection, site unspecified [N39.0]; Sepsis, due to unspecified organism [A41.9]) MD Samir Moss Depa, DO          Anesthesia Type: general  Last vitals  BP      Temp      Pulse     Resp      SpO2        Post Anesthesia Care and Evaluation    Patient location during evaluation: PHASE II  Patient participation: complete - patient participated  Level of consciousness: awake and alert  Pain score: 0  Pain management: satisfactory to patient  Airway patency: patent  Anesthetic complications: No anesthetic complications  PONV Status: controlled  Cardiovascular status: acceptable and stable  Respiratory status: acceptable  Hydration status: acceptable  No anesthesia care post op

## 2017-06-29 ENCOUNTER — APPOINTMENT (OUTPATIENT)
Dept: CT IMAGING | Facility: HOSPITAL | Age: 72
End: 2017-06-29

## 2017-06-29 LAB
ALBUMIN SERPL-MCNC: 3.7 G/DL (ref 3.4–4.8)
ALBUMIN/GLOB SERPL: 1.2 G/DL (ref 1.5–2.5)
ALP SERPL-CCNC: 126 U/L (ref 35–104)
ALT SERPL W P-5'-P-CCNC: 22 U/L (ref 10–36)
ANION GAP SERPL CALCULATED.3IONS-SCNC: 10.6 MMOL/L (ref 3.6–11.2)
AST SERPL-CCNC: 24 U/L (ref 10–30)
BASOPHILS # BLD AUTO: 0.03 10*3/MM3 (ref 0–0.3)
BASOPHILS NFR BLD AUTO: 0.2 % (ref 0–2)
BILIRUB SERPL-MCNC: 0.3 MG/DL (ref 0.2–1.8)
BUN BLD-MCNC: 51 MG/DL (ref 7–21)
BUN/CREAT SERPL: 39.2 (ref 7–25)
CALCIUM SPEC-SCNC: 9 MG/DL (ref 7.7–10)
CHLORIDE SERPL-SCNC: 110 MMOL/L (ref 99–112)
CO2 SERPL-SCNC: 18.4 MMOL/L (ref 24.3–31.9)
CREAT BLD-MCNC: 1.3 MG/DL (ref 0.43–1.29)
CRP SERPL-MCNC: 21.31 MG/DL (ref 0–0.99)
DEPRECATED RDW RBC AUTO: 46.5 FL (ref 37–54)
EOSINOPHIL # BLD AUTO: 0.11 10*3/MM3 (ref 0–0.7)
EOSINOPHIL NFR BLD AUTO: 0.9 % (ref 0–7)
ERYTHROCYTE [DISTWIDTH] IN BLOOD BY AUTOMATED COUNT: 14.1 % (ref 11.5–14.5)
FERRITIN SERPL-MCNC: 95 NG/ML (ref 10–290.3)
FOLATE SERPL-MCNC: 21.71 NG/ML (ref 5.4–20)
GFR SERPL CREATININE-BSD FRML MDRD: 40 ML/MIN/1.73
GLOBULIN UR ELPH-MCNC: 3 GM/DL
GLUCOSE BLD-MCNC: 124 MG/DL (ref 70–110)
HCT VFR BLD AUTO: 28.3 % (ref 37–47)
HGB BLD-MCNC: 8.5 G/DL (ref 12–16)
IMM GRANULOCYTES # BLD: 0.05 10*3/MM3 (ref 0–0.03)
IMM GRANULOCYTES NFR BLD: 0.4 % (ref 0–0.5)
IRON 24H UR-MRATE: 14 MCG/DL (ref 49–151)
IRON SATN MFR SERPL: 4 % (ref 15–50)
LYMPHOCYTES # BLD AUTO: 1.07 10*3/MM3 (ref 1–3)
LYMPHOCYTES NFR BLD AUTO: 8.6 % (ref 16–46)
MCH RBC QN AUTO: 29.2 PG (ref 27–33)
MCHC RBC AUTO-ENTMCNC: 30 G/DL (ref 33–37)
MCV RBC AUTO: 97.3 FL (ref 80–94)
MONOCYTES # BLD AUTO: 0.93 10*3/MM3 (ref 0.1–0.9)
MONOCYTES NFR BLD AUTO: 7.5 % (ref 0–12)
NEUTROPHILS # BLD AUTO: 10.24 10*3/MM3 (ref 1.4–6.5)
NEUTROPHILS NFR BLD AUTO: 82.4 % (ref 40–75)
OSMOLALITY SERPL CALC.SUM OF ELEC: 292.6 MOSM/KG (ref 273–305)
PLATELET # BLD AUTO: 322 10*3/MM3 (ref 130–400)
PMV BLD AUTO: 9.6 FL (ref 6–10)
POTASSIUM BLD-SCNC: 4.5 MMOL/L (ref 3.5–5.3)
PROT SERPL-MCNC: 6.7 G/DL (ref 6–8)
RBC # BLD AUTO: 2.91 10*6/MM3 (ref 4.2–5.4)
RETICS #: 0.08 10*6/MM3 (ref 0.02–0.13)
RETICS/RBC NFR AUTO: 2.61 % (ref 0.5–2)
SODIUM BLD-SCNC: 139 MMOL/L (ref 135–153)
TIBC SERPL-MCNC: 325 MCG/DL (ref 241–421)
TSH SERPL DL<=0.05 MIU/L-ACNC: 2.93 MIU/ML (ref 0.55–4.78)
VIT B12 BLD-MCNC: 591 PG/ML (ref 211–911)
WBC NRBC COR # BLD: 12.43 10*3/MM3 (ref 4.5–12.5)

## 2017-06-29 PROCEDURE — 70450 CT HEAD/BRAIN W/O DYE: CPT | Performed by: RADIOLOGY

## 2017-06-29 PROCEDURE — 93005 ELECTROCARDIOGRAM TRACING: CPT | Performed by: INTERNAL MEDICINE

## 2017-06-29 PROCEDURE — 99222 1ST HOSP IP/OBS MODERATE 55: CPT | Performed by: PSYCHIATRY & NEUROLOGY

## 2017-06-29 PROCEDURE — 83550 IRON BINDING TEST: CPT | Performed by: INTERNAL MEDICINE

## 2017-06-29 PROCEDURE — 85025 COMPLETE CBC W/AUTO DIFF WBC: CPT | Performed by: INTERNAL MEDICINE

## 2017-06-29 PROCEDURE — 70450 CT HEAD/BRAIN W/O DYE: CPT

## 2017-06-29 PROCEDURE — 82728 ASSAY OF FERRITIN: CPT | Performed by: INTERNAL MEDICINE

## 2017-06-29 PROCEDURE — 84443 ASSAY THYROID STIM HORMONE: CPT | Performed by: INTERNAL MEDICINE

## 2017-06-29 PROCEDURE — 99233 SBSQ HOSP IP/OBS HIGH 50: CPT | Performed by: INTERNAL MEDICINE

## 2017-06-29 PROCEDURE — 99231 SBSQ HOSP IP/OBS SF/LOW 25: CPT | Performed by: SURGERY

## 2017-06-29 PROCEDURE — 80053 COMPREHEN METABOLIC PANEL: CPT | Performed by: INTERNAL MEDICINE

## 2017-06-29 PROCEDURE — 85045 AUTOMATED RETICULOCYTE COUNT: CPT | Performed by: INTERNAL MEDICINE

## 2017-06-29 PROCEDURE — 82746 ASSAY OF FOLIC ACID SERUM: CPT | Performed by: INTERNAL MEDICINE

## 2017-06-29 PROCEDURE — 94799 UNLISTED PULMONARY SVC/PX: CPT

## 2017-06-29 PROCEDURE — 86140 C-REACTIVE PROTEIN: CPT | Performed by: INTERNAL MEDICINE

## 2017-06-29 PROCEDURE — 83540 ASSAY OF IRON: CPT | Performed by: INTERNAL MEDICINE

## 2017-06-29 PROCEDURE — 82607 VITAMIN B-12: CPT | Performed by: INTERNAL MEDICINE

## 2017-06-29 PROCEDURE — 25010000002 CEFTRIAXONE: Performed by: INTERNAL MEDICINE

## 2017-06-29 PROCEDURE — 93010 ELECTROCARDIOGRAM REPORT: CPT | Performed by: INTERNAL MEDICINE

## 2017-06-29 PROCEDURE — 25010000002 ENOXAPARIN PER 10 MG: Performed by: SURGERY

## 2017-06-29 RX ORDER — SODIUM CHLORIDE 450 MG/100ML
75 INJECTION, SOLUTION INTRAVENOUS CONTINUOUS
Status: DISCONTINUED | OUTPATIENT
Start: 2017-06-29 | End: 2017-06-30 | Stop reason: HOSPADM

## 2017-06-29 RX ORDER — ASPIRIN 81 MG/1
81 TABLET, CHEWABLE ORAL DAILY
Status: DISCONTINUED | OUTPATIENT
Start: 2017-06-29 | End: 2017-06-30 | Stop reason: HOSPADM

## 2017-06-29 RX ADMIN — METOPROLOL TARTRATE 25 MG: 25 TABLET, FILM COATED ORAL at 14:24

## 2017-06-29 RX ADMIN — METOPROLOL TARTRATE 25 MG: 25 TABLET, FILM COATED ORAL at 21:03

## 2017-06-29 RX ADMIN — SODIUM CHLORIDE 75 ML/HR: 4.5 INJECTION, SOLUTION INTRAVENOUS at 12:18

## 2017-06-29 RX ADMIN — FERROUS SULFATE TAB 325 MG (65 MG ELEMENTAL FE) 325 MG: 325 (65 FE) TAB at 09:06

## 2017-06-29 RX ADMIN — CEFTRIAXONE 1 G: 1 INJECTION, POWDER, FOR SOLUTION INTRAMUSCULAR; INTRAVENOUS at 23:15

## 2017-06-29 RX ADMIN — ENOXAPARIN SODIUM 40 MG: 40 INJECTION SUBCUTANEOUS at 09:06

## 2017-06-29 RX ADMIN — DONEPEZIL HYDROCHLORIDE 10 MG: 5 TABLET, FILM COATED ORAL at 21:03

## 2017-06-29 RX ADMIN — GUAIFENESIN 400 MG: 100 SOLUTION ORAL at 17:13

## 2017-06-29 RX ADMIN — ASPIRIN 81 MG: 81 TABLET, CHEWABLE ORAL at 09:06

## 2017-06-29 RX ADMIN — PREDNISOLONE ACETATE 1 DROP: 10 SUSPENSION/ DROPS OPHTHALMIC at 23:15

## 2017-06-29 RX ADMIN — DICLOFENAC SODIUM 1 DROP: 1 SOLUTION/ DROPS OPHTHALMIC at 17:13

## 2017-06-29 RX ADMIN — MEMANTINE HYDROCHLORIDE 10 MG: 10 TABLET ORAL at 09:06

## 2017-06-29 RX ADMIN — MEMANTINE HYDROCHLORIDE 10 MG: 10 TABLET ORAL at 21:03

## 2017-06-29 RX ADMIN — DICLOFENAC SODIUM 1 DROP: 1 SOLUTION/ DROPS OPHTHALMIC at 21:08

## 2017-06-29 RX ADMIN — PREDNISOLONE ACETATE 1 DROP: 10 SUSPENSION/ DROPS OPHTHALMIC at 06:05

## 2017-06-29 RX ADMIN — SERTRALINE 25 MG: 25 TABLET, FILM COATED ORAL at 09:06

## 2017-06-29 RX ADMIN — DICLOFENAC SODIUM 1 DROP: 1 SOLUTION/ DROPS OPHTHALMIC at 12:18

## 2017-06-29 RX ADMIN — DICLOFENAC SODIUM 1 DROP: 1 SOLUTION/ DROPS OPHTHALMIC at 09:06

## 2017-06-29 RX ADMIN — MIRTAZAPINE 30 MG: 15 TABLET, FILM COATED ORAL at 21:03

## 2017-06-29 RX ADMIN — LEVOTHYROXINE SODIUM 25 MCG: 25 TABLET ORAL at 09:06

## 2017-06-29 RX ADMIN — ATORVASTATIN CALCIUM 40 MG: 40 TABLET, FILM COATED ORAL at 21:03

## 2017-06-29 RX ADMIN — AMLODIPINE BESYLATE 5 MG: 5 TABLET ORAL at 09:06

## 2017-06-29 RX ADMIN — GUAIFENESIN 400 MG: 100 SOLUTION ORAL at 21:03

## 2017-06-29 RX ADMIN — PANTOPRAZOLE SODIUM 40 MG: 40 TABLET, DELAYED RELEASE ORAL at 06:05

## 2017-06-29 RX ADMIN — GUAIFENESIN 400 MG: 100 SOLUTION ORAL at 09:06

## 2017-06-29 RX ADMIN — ACETAMINOPHEN 487.5 MG: 325 TABLET ORAL at 19:21

## 2017-06-29 RX ADMIN — PREDNISOLONE ACETATE 1 DROP: 10 SUSPENSION/ DROPS OPHTHALMIC at 17:13

## 2017-06-29 RX ADMIN — PREDNISOLONE ACETATE 1 DROP: 10 SUSPENSION/ DROPS OPHTHALMIC at 12:18

## 2017-06-30 VITALS
TEMPERATURE: 98.9 F | WEIGHT: 183.44 LBS | SYSTOLIC BLOOD PRESSURE: 150 MMHG | OXYGEN SATURATION: 98 % | HEIGHT: 67 IN | BODY MASS INDEX: 28.79 KG/M2 | HEART RATE: 62 BPM | RESPIRATION RATE: 18 BRPM | DIASTOLIC BLOOD PRESSURE: 73 MMHG

## 2017-06-30 LAB
ALBUMIN SERPL-MCNC: 3.7 G/DL (ref 3.4–4.8)
ALBUMIN/GLOB SERPL: 1.1 G/DL (ref 1.5–2.5)
ALP SERPL-CCNC: 126 U/L (ref 35–104)
ALT SERPL W P-5'-P-CCNC: 25 U/L (ref 10–36)
ANION GAP SERPL CALCULATED.3IONS-SCNC: 7 MMOL/L (ref 3.6–11.2)
AST SERPL-CCNC: 34 U/L (ref 10–30)
BACTERIA SPEC AEROBE CULT: ABNORMAL
BILIRUB SERPL-MCNC: 0.2 MG/DL (ref 0.2–1.8)
BUN BLD-MCNC: 31 MG/DL (ref 7–21)
BUN/CREAT SERPL: 29 (ref 7–25)
CALCIUM SPEC-SCNC: 9.4 MG/DL (ref 7.7–10)
CHLORIDE SERPL-SCNC: 109 MMOL/L (ref 99–112)
CO2 SERPL-SCNC: 21 MMOL/L (ref 24.3–31.9)
CREAT BLD-MCNC: 1.07 MG/DL (ref 0.43–1.29)
CRP SERPL-MCNC: 15.72 MG/DL (ref 0–0.99)
DEPRECATED RDW RBC AUTO: 50.9 FL (ref 37–54)
EOSINOPHIL # BLD MANUAL: 0.37 10*3/MM3 (ref 0–0.7)
EOSINOPHIL NFR BLD MANUAL: 4 % (ref 0–7)
ERYTHROCYTE [DISTWIDTH] IN BLOOD BY AUTOMATED COUNT: 14.2 % (ref 11.5–14.5)
GFR SERPL CREATININE-BSD FRML MDRD: 50 ML/MIN/1.73
GLOBULIN UR ELPH-MCNC: 3.5 GM/DL
GLUCOSE BLD-MCNC: 114 MG/DL (ref 70–110)
HCT VFR BLD AUTO: 28 % (ref 37–47)
HGB BLD-MCNC: 8.2 G/DL (ref 12–16)
HYPOCHROMIA BLD QL: ABNORMAL
HYPOCHROMIA BLD QL: NORMAL
LARGE PLATELETS: ABNORMAL
LYMPHOCYTES # BLD MANUAL: 1.4 10*3/MM3 (ref 1–3)
LYMPHOCYTES NFR BLD MANUAL: 15 % (ref 16–46)
LYMPHOCYTES NFR BLD MANUAL: 6 % (ref 0–12)
MACROCYTES BLD QL SMEAR: ABNORMAL
MACROCYTES BLD QL SMEAR: NORMAL
MCH RBC QN AUTO: 28.8 PG (ref 27–33)
MCHC RBC AUTO-ENTMCNC: 29.3 G/DL (ref 33–37)
MCV RBC AUTO: 98.2 FL (ref 80–94)
MONOCYTES # BLD AUTO: 0.56 10*3/MM3 (ref 0.1–0.9)
NEUTROPHILS # BLD AUTO: 6.99 10*3/MM3 (ref 1.4–6.5)
NEUTROPHILS NFR BLD MANUAL: 72 % (ref 40–75)
NEUTS BAND NFR BLD MANUAL: 3 % (ref 0–8)
OSMOLALITY SERPL CALC.SUM OF ELEC: 281.2 MOSM/KG (ref 273–305)
PLATELET # BLD AUTO: 289 10*3/MM3 (ref 130–400)
PMV BLD AUTO: 10 FL (ref 6–10)
POTASSIUM BLD-SCNC: 4.2 MMOL/L (ref 3.5–5.3)
PROT SERPL-MCNC: 7.2 G/DL (ref 6–8)
RBC # BLD AUTO: 2.85 10*6/MM3 (ref 4.2–5.4)
SCAN SLIDE: NORMAL
SODIUM BLD-SCNC: 137 MMOL/L (ref 135–153)
WBC NRBC COR # BLD: 9.32 10*3/MM3 (ref 4.5–12.5)

## 2017-06-30 PROCEDURE — 94799 UNLISTED PULMONARY SVC/PX: CPT

## 2017-06-30 PROCEDURE — 86140 C-REACTIVE PROTEIN: CPT | Performed by: INTERNAL MEDICINE

## 2017-06-30 PROCEDURE — 85007 BL SMEAR W/DIFF WBC COUNT: CPT | Performed by: INTERNAL MEDICINE

## 2017-06-30 PROCEDURE — 80053 COMPREHEN METABOLIC PANEL: CPT | Performed by: INTERNAL MEDICINE

## 2017-06-30 PROCEDURE — 99239 HOSP IP/OBS DSCHRG MGMT >30: CPT | Performed by: INTERNAL MEDICINE

## 2017-06-30 PROCEDURE — 85025 COMPLETE CBC W/AUTO DIFF WBC: CPT | Performed by: INTERNAL MEDICINE

## 2017-06-30 RX ORDER — CEFDINIR 300 MG/1
300 CAPSULE ORAL EVERY 12 HOURS SCHEDULED
Refills: 0
Start: 2017-06-30 | End: 2017-07-08

## 2017-06-30 RX ORDER — ERYTHROMYCIN 5 MG/G
OINTMENT OPHTHALMIC 2 TIMES DAILY
Refills: 0
Start: 2017-06-30 | End: 2017-07-05

## 2017-06-30 RX ORDER — CEFDINIR 300 MG/1
300 CAPSULE ORAL EVERY 12 HOURS SCHEDULED
Status: DISCONTINUED | OUTPATIENT
Start: 2017-06-30 | End: 2017-06-30 | Stop reason: HOSPADM

## 2017-06-30 RX ADMIN — SERTRALINE 25 MG: 25 TABLET, FILM COATED ORAL at 09:07

## 2017-06-30 RX ADMIN — AMLODIPINE BESYLATE 5 MG: 5 TABLET ORAL at 09:06

## 2017-06-30 RX ADMIN — METOPROLOL TARTRATE 25 MG: 25 TABLET, FILM COATED ORAL at 09:06

## 2017-06-30 RX ADMIN — LEVOTHYROXINE SODIUM 25 MCG: 25 TABLET ORAL at 09:07

## 2017-06-30 RX ADMIN — PREDNISOLONE ACETATE 1 DROP: 10 SUSPENSION/ DROPS OPHTHALMIC at 11:29

## 2017-06-30 RX ADMIN — PREDNISOLONE ACETATE 1 DROP: 10 SUSPENSION/ DROPS OPHTHALMIC at 05:41

## 2017-06-30 RX ADMIN — MEMANTINE HYDROCHLORIDE 10 MG: 10 TABLET ORAL at 09:07

## 2017-06-30 RX ADMIN — CEFDINIR 300 MG: 300 CAPSULE ORAL at 13:09

## 2017-06-30 RX ADMIN — FERROUS SULFATE TAB 325 MG (65 MG ELEMENTAL FE) 325 MG: 325 (65 FE) TAB at 09:07

## 2017-06-30 RX ADMIN — SODIUM CHLORIDE 75 ML/HR: 4.5 INJECTION, SOLUTION INTRAVENOUS at 05:41

## 2017-06-30 RX ADMIN — DICLOFENAC SODIUM 1 DROP: 1 SOLUTION/ DROPS OPHTHALMIC at 09:07

## 2017-06-30 RX ADMIN — DICLOFENAC SODIUM 1 DROP: 1 SOLUTION/ DROPS OPHTHALMIC at 11:29

## 2017-06-30 RX ADMIN — ASPIRIN 81 MG: 81 TABLET, CHEWABLE ORAL at 09:06

## 2017-06-30 RX ADMIN — GUAIFENESIN 400 MG: 100 SOLUTION ORAL at 09:07

## 2017-06-30 RX ADMIN — PANTOPRAZOLE SODIUM 40 MG: 40 TABLET, DELAYED RELEASE ORAL at 05:41

## 2017-07-03 LAB
BACTERIA SPEC AEROBE CULT: NORMAL
BACTERIA SPEC AEROBE CULT: NORMAL

## 2017-07-18 ENCOUNTER — LAB REQUISITION (OUTPATIENT)
Dept: LAB | Facility: HOSPITAL | Age: 72
End: 2017-07-18

## 2017-07-18 DIAGNOSIS — I10 ESSENTIAL (PRIMARY) HYPERTENSION: ICD-10-CM

## 2017-07-18 LAB
ANION GAP SERPL CALCULATED.3IONS-SCNC: 7 MMOL/L (ref 3.6–11.2)
BUN BLD-MCNC: 36 MG/DL (ref 7–21)
BUN/CREAT SERPL: 30.5 (ref 7–25)
CALCIUM SPEC-SCNC: 9.7 MG/DL (ref 7.7–10)
CHLORIDE SERPL-SCNC: 107 MMOL/L (ref 99–112)
CO2 SERPL-SCNC: 26 MMOL/L (ref 24.3–31.9)
CREAT BLD-MCNC: 1.18 MG/DL (ref 0.43–1.29)
GFR SERPL CREATININE-BSD FRML MDRD: 45 ML/MIN/1.73
GLUCOSE BLD-MCNC: 168 MG/DL (ref 70–110)
OSMOLALITY SERPL CALC.SUM OF ELEC: 291.6 MOSM/KG (ref 273–305)
POTASSIUM BLD-SCNC: 4.1 MMOL/L (ref 3.5–5.3)
SODIUM BLD-SCNC: 140 MMOL/L (ref 135–153)

## 2017-07-18 PROCEDURE — 80048 BASIC METABOLIC PNL TOTAL CA: CPT | Performed by: INTERNAL MEDICINE

## 2017-08-08 ENCOUNTER — LAB REQUISITION (OUTPATIENT)
Dept: LAB | Facility: HOSPITAL | Age: 72
End: 2017-08-08

## 2017-08-08 DIAGNOSIS — R53.83 OTHER FATIGUE: ICD-10-CM

## 2017-08-08 DIAGNOSIS — E78.5 HYPERLIPIDEMIA: ICD-10-CM

## 2017-08-08 DIAGNOSIS — I63.9 CEREBRAL INFARCTION (HCC): ICD-10-CM

## 2017-08-08 LAB
ALBUMIN SERPL-MCNC: 4.2 G/DL (ref 3.4–4.8)
ALBUMIN/GLOB SERPL: 1.4 G/DL (ref 1.5–2.5)
ALP SERPL-CCNC: 146 U/L (ref 35–104)
ALT SERPL W P-5'-P-CCNC: 27 U/L (ref 10–36)
ANION GAP SERPL CALCULATED.3IONS-SCNC: 6.1 MMOL/L (ref 3.6–11.2)
AST SERPL-CCNC: 30 U/L (ref 10–30)
BASOPHILS # BLD AUTO: 0.03 10*3/MM3 (ref 0–0.3)
BASOPHILS NFR BLD AUTO: 0.4 % (ref 0–2)
BILIRUB SERPL-MCNC: 0.2 MG/DL (ref 0.2–1.8)
BUN BLD-MCNC: 25 MG/DL (ref 7–21)
BUN/CREAT SERPL: 19.5 (ref 7–25)
CALCIUM SPEC-SCNC: 9.9 MG/DL (ref 7.7–10)
CHLORIDE SERPL-SCNC: 104 MMOL/L (ref 99–112)
CHOLEST SERPL-MCNC: 169 MG/DL (ref 0–200)
CO2 SERPL-SCNC: 27.9 MMOL/L (ref 24.3–31.9)
CREAT BLD-MCNC: 1.28 MG/DL (ref 0.43–1.29)
DEPRECATED RDW RBC AUTO: 48.7 FL (ref 37–54)
EOSINOPHIL # BLD AUTO: 0.41 10*3/MM3 (ref 0–0.7)
EOSINOPHIL NFR BLD AUTO: 5.2 % (ref 0–7)
ERYTHROCYTE [DISTWIDTH] IN BLOOD BY AUTOMATED COUNT: 14.9 % (ref 11.5–14.5)
GFR SERPL CREATININE-BSD FRML MDRD: 41 ML/MIN/1.73
GLOBULIN UR ELPH-MCNC: 3.1 GM/DL
GLUCOSE BLD-MCNC: 102 MG/DL (ref 70–110)
HCT VFR BLD AUTO: 34.2 % (ref 37–47)
HDLC SERPL-MCNC: 38 MG/DL (ref 60–100)
HGB BLD-MCNC: 10.6 G/DL (ref 12–16)
IMM GRANULOCYTES # BLD: 0.05 10*3/MM3 (ref 0–0.03)
IMM GRANULOCYTES NFR BLD: 0.6 % (ref 0–0.5)
IRON 24H UR-MRATE: 33 MCG/DL (ref 49–151)
IRON SATN MFR SERPL: 8 % (ref 15–50)
LDLC SERPL CALC-MCNC: 74 MG/DL (ref 0–100)
LDLC/HDLC SERPL: 1.95 {RATIO}
LYMPHOCYTES # BLD AUTO: 1.67 10*3/MM3 (ref 1–3)
LYMPHOCYTES NFR BLD AUTO: 21.3 % (ref 16–46)
MCH RBC QN AUTO: 28.4 PG (ref 27–33)
MCHC RBC AUTO-ENTMCNC: 31 G/DL (ref 33–37)
MCV RBC AUTO: 91.7 FL (ref 80–94)
MONOCYTES # BLD AUTO: 0.6 10*3/MM3 (ref 0.1–0.9)
MONOCYTES NFR BLD AUTO: 7.7 % (ref 0–12)
NEUTROPHILS # BLD AUTO: 5.07 10*3/MM3 (ref 1.4–6.5)
NEUTROPHILS NFR BLD AUTO: 64.8 % (ref 40–75)
OSMOLALITY SERPL CALC.SUM OF ELEC: 280.3 MOSM/KG (ref 273–305)
PLATELET # BLD AUTO: 272 10*3/MM3 (ref 130–400)
PMV BLD AUTO: 9.5 FL (ref 6–10)
POTASSIUM BLD-SCNC: 5.4 MMOL/L (ref 3.5–5.3)
PROT SERPL-MCNC: 7.3 G/DL (ref 6–8)
RBC # BLD AUTO: 3.73 10*6/MM3 (ref 4.2–5.4)
SODIUM BLD-SCNC: 138 MMOL/L (ref 135–153)
TIBC SERPL-MCNC: 411 MCG/DL (ref 241–421)
TRIGL SERPL-MCNC: 284 MG/DL (ref 0–150)
TSH SERPL DL<=0.05 MIU/L-ACNC: 2.07 MIU/ML (ref 0.55–4.78)
VLDLC SERPL-MCNC: 56.8 MG/DL
WBC NRBC COR # BLD: 7.83 10*3/MM3 (ref 4.5–12.5)

## 2017-08-08 PROCEDURE — 80061 LIPID PANEL: CPT | Performed by: FAMILY MEDICINE

## 2017-08-08 PROCEDURE — 83550 IRON BINDING TEST: CPT | Performed by: FAMILY MEDICINE

## 2017-08-08 PROCEDURE — 83540 ASSAY OF IRON: CPT | Performed by: FAMILY MEDICINE

## 2017-08-08 PROCEDURE — 85025 COMPLETE CBC W/AUTO DIFF WBC: CPT | Performed by: FAMILY MEDICINE

## 2017-08-08 PROCEDURE — 84443 ASSAY THYROID STIM HORMONE: CPT | Performed by: FAMILY MEDICINE

## 2017-08-08 PROCEDURE — 80053 COMPREHEN METABOLIC PANEL: CPT | Performed by: FAMILY MEDICINE

## 2017-08-15 ENCOUNTER — OFFICE VISIT (OUTPATIENT)
Dept: SURGERY | Facility: CLINIC | Age: 72
End: 2017-08-15

## 2017-08-15 VITALS — BODY MASS INDEX: 27.15 KG/M2 | WEIGHT: 173 LBS | HEIGHT: 67 IN

## 2017-08-15 DIAGNOSIS — I63.9 CEREBROVASCULAR ACCIDENT (CVA), UNSPECIFIED MECHANISM (HCC): Primary | ICD-10-CM

## 2017-08-15 PROCEDURE — 99213 OFFICE O/P EST LOW 20 MIN: CPT | Performed by: SURGERY

## 2017-08-15 NOTE — PROGRESS NOTES
"Stacy Her is a 71 y.o. female is here today for follow-up.    HPI Comments: 71-year-old female with a history of stroke.  She developed dysphagia secondary to stroke requiring PEG tube placement in the acute phase.  She's done well with rehabilitation and no longer requires gastric tube feedings.  On examination her PEG site is clean dry and intact.  The PEG tube is functional but no longer required.  She is on no anticoagulation apart from antiplatelet therapy with aspirin.      Past Medical History:   Diagnosis Date   • Acute on chronic renal insufficiency 7/15/2016   • Anemia    • Anxiety    • Arthritis    • Arthritis    • Bipolar 1 disorder    • Bradycardia    • COPD (chronic obstructive pulmonary disease)    • Coronary artery disease    • Dementia    • Disease of thyroid gland    • Generalized weakness 7/14/2016   • GERD (gastroesophageal reflux disease)    • Heart disease    • History of transfusion    • Hyperlipidemia    • Hypertension    • PAD (peripheral artery disease)    • Stroke    • TIA (transient ischemic attack)    • Vertigo        Family History   Problem Relation Age of Onset   • Heart disease Mother    • Diabetes Mother    • Cancer Father    • Diabetes Sister    • Cancer Brother        Social History     Social History   • Marital status:      Spouse name: N/A   • Number of children: N/A   • Years of education: N/A     Occupational History   • Not on file.     Social History Main Topics   • Smoking status: Former Smoker     Packs/day: 1.00     Years: 40.00     Types: Cigarettes     Quit date: 2/6/2017   • Smokeless tobacco: Never Used   • Alcohol use No      Comment: \"used to drink a lot\" none now  (per daughter)   • Drug use: No   • Sexual activity: Defer     Other Topics Concern   • Not on file     Social History Narrative    Currently lives in [facility] with daughter.        Past Surgical History:   Procedure Laterality Date   • ANGIOPLASTY SUBCLAVIAN ARTERY     • CAROTID " "ENDARTERECTOMY Bilateral    • COLONOSCOPY  2012   • COLONOSCOPY N/A 2/24/2017    Procedure: COLONOSCOPY  CPTCODE:64792;  Surgeon: Suraj Quintanilla III, MD;  Location: [facility] OR;  Service:    • ENDOSCOPY     • ENDOSCOPY N/A 2/24/2017    Procedure: ESOPHAGOGASTRODUODENOSCOPY WITH BIOPSY  CPTCODE:83996;  Surgeon: Suraj Quintanilla III, MD;  Location: [facility] OR;  Service:    • ENDOSCOPY W/ PEG TUBE PLACEMENT N/A 6/28/2017    Procedure: ESOPHAGOGASTRODUODENOSCOPY WITH PERCUTANEOUS ENDOSCOPIC GASTROSTOMY TUBE INSERTION;  Surgeon: Renan Montanez MD;  Location: [facility] OR;  Service:    • EYE SURGERY      bilat cataracts   • RENAL ARTERY BYPASS         The following portions of the patient's history were reviewed and updated as appropriate: allergies, current medications, past family history, past medical history, past social history, past surgical history and problem list.    Review of Systems   Constitutional: Negative for activity change, appetite change, chills and fever.   HENT: Negative for sore throat and trouble swallowing.    Eyes: Negative for visual disturbance.   Respiratory: Negative for cough and shortness of breath.    Cardiovascular: Negative for chest pain and palpitations.   Gastrointestinal: Negative for abdominal distention, abdominal pain, blood in stool, constipation, diarrhea, nausea and vomiting.   Endocrine: Negative for cold intolerance and heat intolerance.   Genitourinary: Negative for dysuria.   Musculoskeletal: Negative for joint swelling.   Skin: Negative for color change, rash and wound.   Allergic/Immunologic: Negative for immunocompromised state.   Neurological: Negative for dizziness, seizures, weakness and headaches.   Hematological: Negative for adenopathy. Does not bruise/bleed easily.   Psychiatric/Behavioral: Negative for agitation and confusion.         Ht 67\" (170.2 cm)  Wt 173 lb (78.5 kg)  LMP  (LMP Unknown)  BMI 27.1 kg/m2  Objective   Physical Exam   Constitutional: " She is oriented to person, place, and time. She appears well-developed.   HENT:   Head: Normocephalic and atraumatic.   Mouth/Throat: Mucous membranes are normal.   Eyes: Conjunctivae are normal. Pupils are equal, round, and reactive to light.   Neck: Neck supple. No JVD present. No tracheal deviation present. No thyromegaly present.   Cardiovascular: Normal rate and regular rhythm.  Exam reveals no gallop and no friction rub.    No murmur heard.  Pulmonary/Chest: Effort normal and breath sounds normal.   Abdominal: Soft. She exhibits no distension. There is no splenomegaly or hepatomegaly. There is no tenderness. No hernia.   PEG site clean dry and intact   Musculoskeletal: Normal range of motion. She exhibits no deformity.   Neurological: She is alert and oriented to person, place, and time.   Skin: Skin is warm and dry.   Psychiatric: She has a normal mood and affect.         Sunshine was seen today for follow-up.    Diagnoses and all orders for this visit:    Cerebrovascular accident (CVA), unspecified mechanism        Assessment     71-year-old female with PEG tube in place that is no longer required.  She is tolerating oral intake without issue.  The PEG tube site is clean dry and intact and the PEG is been removed in the office today.  She will keep a cover with gauze for 48 hours at which time she will remove bandage as needed.  She will follow up in my office as needed.

## 2017-10-10 ENCOUNTER — OFFICE VISIT (OUTPATIENT)
Dept: CARDIOLOGY | Facility: CLINIC | Age: 72
End: 2017-10-10

## 2017-10-10 VITALS
SYSTOLIC BLOOD PRESSURE: 156 MMHG | DIASTOLIC BLOOD PRESSURE: 69 MMHG | HEART RATE: 64 BPM | RESPIRATION RATE: 16 BRPM | HEIGHT: 67 IN

## 2017-10-10 DIAGNOSIS — I10 ESSENTIAL HYPERTENSION: Chronic | ICD-10-CM

## 2017-10-10 DIAGNOSIS — E78.5 DYSLIPIDEMIA: ICD-10-CM

## 2017-10-10 DIAGNOSIS — I48.0 PAROXYSMAL ATRIAL FIBRILLATION (HCC): Primary | ICD-10-CM

## 2017-10-10 PROCEDURE — 93000 ELECTROCARDIOGRAM COMPLETE: CPT | Performed by: INTERNAL MEDICINE

## 2017-10-10 PROCEDURE — 99204 OFFICE O/P NEW MOD 45 MIN: CPT | Performed by: INTERNAL MEDICINE

## 2017-10-10 RX ORDER — AMLODIPINE BESYLATE 5 MG/1
10 TABLET ORAL
Qty: 30 TABLET | Refills: 6 | Status: SHIPPED | OUTPATIENT
Start: 2017-10-10

## 2017-10-10 RX ORDER — ATENOLOL 25 MG/1
25 TABLET ORAL DAILY
COMMUNITY

## 2017-10-10 RX ORDER — OMEPRAZOLE 40 MG/1
40 CAPSULE, DELAYED RELEASE ORAL DAILY
COMMUNITY
End: 2017-11-15 | Stop reason: ALTCHOICE

## 2017-10-10 RX ORDER — DULOXETIN HYDROCHLORIDE 60 MG/1
60 CAPSULE, DELAYED RELEASE ORAL NIGHTLY PRN
COMMUNITY

## 2017-10-10 RX ORDER — CLOPIDOGREL BISULFATE 75 MG/1
75 TABLET ORAL DAILY
COMMUNITY
End: 2018-02-21 | Stop reason: ALTCHOICE

## 2017-10-10 RX ORDER — LORATADINE 10 MG/1
10 TABLET ORAL DAILY
COMMUNITY

## 2017-10-10 NOTE — PROGRESS NOTES
MONTANA Montez  Sunshine Her  : 1945  DATE:10/10/2017    Patient Active Problem List   Diagnosis   • Generalized weakness   • Acute on chronic renal insufficiency   • Anemia   • Symptomatic bradycardia   • Leukopenia   • Hypothyroid   • Bipolar affective disorder   • Kidney function abnormal   • COPD (chronic obstructive pulmonary disease)   • CVA (cerebral vascular accident)-Right Occipital/Temporal   • Chronic kidney disease (CKD), stage III (moderate)   • Dementia   • Vertigo   • History of stroke   • Iron deficiency anemia   • CKD (chronic kidney disease), stage III   • Vascular dementia   • Hypertension   • Hyperlipidemia   • Tobacco abuse   • Malignant hypertension   • Orthostasis   • Impaired functional mobility, balance, gait, and endurance   • Sepsis   • Paroxysmal atrial fibrillation   • Dyslipidemia       Dear MONTANA Montez:    Stacy Her is a 71 y.o. female with the above medical problems who is being seen for consultation today at the request of MONTANA Montez. Currently the patient she was recently seen by neurology after a recent stroke on 2017.  However there is also indication that she had a cerebral hemorrhage along details on this are unclear.  At that time she was also diagnosed with atrial fibrillation and started on atenolol for rate control.  She was not started on any anticoagulation in spite of her CHADS VASC score of at least 6 due to the recent cerebral hemorrhage.  She has been told that her strokes are caused by her atrial fibrillation and she is referred to cardiology for further management.  The patient's daughter states the patient has dementia as well as right sided hemiparesis as a result of the strokes.  There is also an echocardiogram on record that showed a possible PFO.      Past Medical History:   Diagnosis Date   • Acute on chronic renal insufficiency 7/15/2016   • Anemia    • Anxiety    • Arthritis    •  "Arthritis    • Bipolar 1 disorder    • Bradycardia    • COPD (chronic obstructive pulmonary disease)    • Coronary artery disease    • Dementia    • Disease of thyroid gland    • Generalized weakness 7/14/2016   • GERD (gastroesophageal reflux disease)    • Heart disease    • History of transfusion    • Hyperlipidemia    • Hypertension    • PAD (peripheral artery disease)    • Stroke    • TIA (transient ischemic attack)    • Vertigo        Past Surgical History:   Procedure Laterality Date   • ANGIOPLASTY SUBCLAVIAN ARTERY     • CAROTID ENDARTERECTOMY Bilateral    • COLONOSCOPY  2012   • COLONOSCOPY N/A 2/24/2017    Procedure: COLONOSCOPY  CPTCODE:61956;  Surgeon: Suraj Quintanilla III, MD;  Location: [facility];  Service:    • ENDOSCOPY     • ENDOSCOPY N/A 2/24/2017    Procedure: ESOPHAGOGASTRODUODENOSCOPY WITH BIOPSY  CPTCODE:76493;  Surgeon: Suraj Quintanilla III, MD;  Location: [facility];  Service:    • ENDOSCOPY W/ PEG TUBE PLACEMENT N/A 6/28/2017    Procedure: ESOPHAGOGASTRODUODENOSCOPY WITH PERCUTANEOUS ENDOSCOPIC GASTROSTOMY TUBE INSERTION;  Surgeon: Renan Montanez MD;  Location: [facility];  Service:    • EYE SURGERY      bilat cataracts   • RENAL ARTERY BYPASS         Family History   Problem Relation Age of Onset   • Heart disease Mother    • Diabetes Mother    • Cancer Father    • Diabetes Sister    • Cancer Brother        Social History     Social History   • Marital status:      Spouse name: N/A   • Number of children: N/A   • Years of education: N/A     Occupational History   • Not on file.     Social History Main Topics   • Smoking status: Former Smoker     Packs/day: 1.00     Years: 40.00     Types: Cigarettes     Quit date: 2/6/2017   • Smokeless tobacco: Never Used   • Alcohol use No      Comment: \"used to drink a lot\" none now  (per daughter)   • Drug use: No   • Sexual activity: Defer     Other Topics Concern   • Not on file     Social History Narrative    Currently lives in [place] with daughter.          Current Outpatient Prescriptions:   •  acetaminophen (TYLENOL) 500 MG tablet, 500 mg by Per PEG Tube route Every 4 (Four) Hours As Needed for Mild Pain (1-3)., Disp: , Rfl:   •  amLODIPine (NORVASC) 5 MG tablet, 2 tablets by Per G Tube route Daily., Disp: 30 tablet, Rfl: 6  •  aspirin 81 MG chewable tablet, Chew 1 tablet Daily. (Patient taking differently: 81 mg by Per PEG Tube route Daily.), Disp: , Rfl:   •  atenolol (TENORMIN) 25 MG tablet, Take 25 mg by mouth Daily., Disp: , Rfl:   •  atorvastatin (LIPITOR) 40 MG tablet, Take 1 tablet by mouth Daily. (Patient taking differently: 40 mg by Per PEG Tube route Every Night.), Disp: , Rfl:   •  clopidogrel (PLAVIX) 75 MG tablet, Take 75 mg by mouth Daily., Disp: , Rfl:   •  donepezil (ARICEPT) 10 MG tablet, Take 1 tablet by mouth Daily. (Patient taking differently: 10 mg by Per PEG Tube route Every Night.), Disp: 30 tablet, Rfl: 11  •  DULoxetine (CYMBALTA) 60 MG capsule, Take 60 mg by mouth At Night As Needed., Disp: , Rfl:   •  ferrous sulfate (IRON SUPPLEMENT) 325 (65 FE) MG tablet, Take 1 tablet by mouth 2 (Two) Times a Day With Meals. (Patient taking differently: 325 mg by Per PEG Tube route Daily With Breakfast.), Disp: 60 tablet, Rfl: 0  •  levothyroxine (SYNTHROID, LEVOTHROID) 25 MCG tablet, 25 mcg by Per PEG Tube route Daily., Disp: , Rfl:   •  loratadine (CLARITIN) 10 MG tablet, Take 10 mg by mouth Daily., Disp: , Rfl:   •  memantine (NAMENDA) 10 MG tablet, Take 1 tablet by mouth 2 (two) times a day. (Patient taking differently: 10 mg by Per PEG Tube route 2 (Two) Times a Day.), Disp: 60 tablet, Rfl: 1  •  mirtazapine (REMERON) 30 MG tablet, 30 mg by Per PEG Tube route Every Night., Disp: , Rfl:   •  omeprazole (priLOSEC) 40 MG capsule, Take 40 mg by mouth Daily., Disp: , Rfl:   •  sertraline (ZOLOFT) 50 MG tablet, 1 tablet by Per G Tube route Daily for 335 doses., Disp: , Rfl:   •  enoxaparin (LOVENOX) 40 MG/0.4ML solution  syringe, Inject 40 mg under the skin Daily., Disp: , Rfl:   •  ipratropium-albuterol (DUO-NEB) 0.5-2.5 mg/mL nebulizer, Take 3 mL by nebulization Every 6 (Six) Hours As Needed for Wheezing., Disp: , Rfl:   •  pantoprazole (PROTONIX) 40 MG EC tablet, Take 1 tablet by mouth 2 (Two) Times a Day Before Meals. (Patient taking differently: 40 mg by Per PEG Tube route Daily.), Disp: 60 tablet, Rfl: 0    The following portions of the patient's history were reviewed and updated as appropriate: allergies, current medications, past family history, past medical history, past social history, past surgical history and problem list.    Review of Systems   Constitution: Negative for chills, diaphoresis, fever, weakness, malaise/fatigue, weight gain and weight loss.   HENT: Negative for congestion, ear discharge, ear pain, hearing loss, hoarse voice, nosebleeds, sore throat and tinnitus.    Eyes: Negative for blurred vision, discharge, double vision, pain and redness.   Cardiovascular: Positive for leg swelling. Negative for chest pain, dyspnea on exertion, irregular heartbeat, orthopnea, palpitations and paroxysmal nocturnal dyspnea.   Respiratory: Negative for cough, hemoptysis, shortness of breath and wheezing.    Endocrine: Negative for cold intolerance, heat intolerance, polydipsia, polyphagia and polyuria.   Hematologic/Lymphatic: Negative for bleeding problem. Does not bruise/bleed easily.        Anemia   Skin: Negative for color change, dry skin, flushing, itching and rash.   Musculoskeletal: Positive for arthritis. Negative for back pain, joint pain, joint swelling, muscle cramps, muscle weakness, myalgias, neck pain and stiffness.   Gastrointestinal: Negative for abdominal pain, change in bowel habit, constipation, diarrhea, heartburn, nausea and vomiting.   Genitourinary: Negative for bladder incontinence, decreased libido, dysuria, frequency and hematuria.   Neurological: Positive for disturbances in coordination,  "dizziness, headaches and light-headedness. Negative for focal weakness, loss of balance, numbness, paresthesias and tremors.        Hx strokes x3   Psychiatric/Behavioral: Negative for altered mental status, depression and memory loss. The patient does not have insomnia.    Allergic/Immunologic: Negative for hives.       Objective   Blood pressure 156/69, pulse 64, resp. rate 16, height 67\" (170.2 cm).    Physical Exam   Constitutional: She is oriented to person, place, and time. She appears well-developed and well-nourished.   WF sitting on wheelchair.   HENT:   Mouth/Throat: Oropharynx is clear and moist.   Eyes: EOM are normal. Pupils are equal, round, and reactive to light.   Neck: Neck supple. No JVD present. No tracheal deviation present. No thyromegaly present.   Cardiovascular: Normal rate, regular rhythm, S1 normal and S2 normal.  Exam reveals no gallop and no friction rub.    No murmur heard.  Pulmonary/Chest: Effort normal and breath sounds normal. No respiratory distress. She has no wheezes. She has no rales.   Abdominal: Soft. Bowel sounds are normal. She exhibits no mass. There is no tenderness.   Musculoskeletal: Normal range of motion. She exhibits no edema.   Lymphadenopathy:     She has no cervical adenopathy.   Neurological: She is alert and oriented to person, place, and time.   Right sided hemiparesis.   Skin: Skin is warm and dry. No rash noted.   Psychiatric: She has a normal mood and affect.         ECG 12 Lead  Date/Time: 10/10/2017 10:21 AM  Performed by: RAMIREZ MCLEOD  Authorized by: RAMIREZ MCLEOD   Comparison: compared with previous ECG from 6/29/2017  Similar to previous ECG  Rhythm: sinus rhythm  Ectopy: atrial premature contractions  Rate: normal  BPM: 50  Conduction: conduction normal  ST Segments: ST segments normal  T Waves: T waves normal  QRS axis: normal  Other: no other findings  Clinical impression: normal ECG            Assessment/Plan      1. "  Paroxysmal atrial fibrillation: The patient is currently maintaining sinus rhythm on recent echocardiogram.  She reports having an episode of atrial fibrillation during her recent stroke although this is unconfirmed.  I am hesitant to start her on any anticoagulation with her history of we will hemorrhage.  This point will continue on atenolol for rate control and request records from the [facility].  We will decide on anticoagulation evaluationofthoserecords.    2.  Hypertension: Patient with a history of hypertension which is currently uncontrolled.  At this point will increase the patient's amlodipine to 10 mg by mouth daily and monitor for improvement.    3.  Dyslipidemia: Patient with a history of dyslipidemia with recent lipid panel done at the [facility].  We will try to obtain records.       Diagnosis Plan   1. Paroxysmal atrial fibrillation  ECG 12 Lead   2. Essential hypertension     3. Dyslipidemia            Return in about 4 weeks (around 11/7/2017).    I appreciate the opportunity to participate in this patient's cardiovascular care.    Best Regards    Bautista Melissa

## 2017-10-11 ENCOUNTER — TELEPHONE (OUTPATIENT)
Dept: CARDIOLOGY | Facility: CLINIC | Age: 72
End: 2017-10-11

## 2017-10-11 NOTE — TELEPHONE ENCOUNTER
----- Message from Bautista Antonio MD sent at 10/10/2017 11:02 AM EDT -----   Please obtain records from [facility] on recent stroke.

## 2017-10-16 ENCOUNTER — TRANSCRIBE ORDERS (OUTPATIENT)
Dept: ADMINISTRATIVE | Facility: HOSPITAL | Age: 72
End: 2017-10-16

## 2017-10-16 DIAGNOSIS — R10.9 ABDOMINAL PAIN, UNSPECIFIED ABDOMINAL LOCATION: Primary | ICD-10-CM

## 2017-10-26 ENCOUNTER — HOSPITAL ENCOUNTER (OUTPATIENT)
Dept: ULTRASOUND IMAGING | Facility: HOSPITAL | Age: 72
Discharge: HOME | End: 2017-10-26
Admitting: NURSE PRACTITIONER

## 2017-10-26 DIAGNOSIS — R10.9 ABDOMINAL PAIN, UNSPECIFIED ABDOMINAL LOCATION: ICD-10-CM

## 2017-10-26 PROCEDURE — 76700 US EXAM ABDOM COMPLETE: CPT | Performed by: RADIOLOGY

## 2017-10-26 PROCEDURE — 76700 US EXAM ABDOM COMPLETE: CPT

## 2017-11-15 ENCOUNTER — TELEPHONE (OUTPATIENT)
Dept: CARDIOLOGY | Facility: CLINIC | Age: 72
End: 2017-11-15

## 2017-11-15 ENCOUNTER — OFFICE VISIT (OUTPATIENT)
Dept: CARDIOLOGY | Facility: CLINIC | Age: 72
End: 2017-11-15

## 2017-11-15 VITALS
HEART RATE: 60 BPM | HEIGHT: 67 IN | SYSTOLIC BLOOD PRESSURE: 136 MMHG | BODY MASS INDEX: 28.31 KG/M2 | OXYGEN SATURATION: 94 % | WEIGHT: 180.4 LBS | DIASTOLIC BLOOD PRESSURE: 60 MMHG | RESPIRATION RATE: 16 BRPM

## 2017-11-15 DIAGNOSIS — E78.5 DYSLIPIDEMIA: ICD-10-CM

## 2017-11-15 DIAGNOSIS — I10 ESSENTIAL HYPERTENSION: Chronic | ICD-10-CM

## 2017-11-15 DIAGNOSIS — I48.0 PAROXYSMAL ATRIAL FIBRILLATION (HCC): Primary | ICD-10-CM

## 2017-11-15 PROCEDURE — 99213 OFFICE O/P EST LOW 20 MIN: CPT | Performed by: INTERNAL MEDICINE

## 2017-11-15 RX ORDER — QUETIAPINE 150 MG/1
150 TABLET, FILM COATED, EXTENDED RELEASE ORAL NIGHTLY
COMMUNITY

## 2017-11-15 RX ORDER — DONEPEZIL HYDROCHLORIDE 10 MG/1
10 TABLET, FILM COATED ORAL NIGHTLY
COMMUNITY

## 2017-11-15 NOTE — TELEPHONE ENCOUNTER
----- Message from Bautista Antonio MD sent at 11/15/2017 10:58 AM EST -----   Please obtain records form the [facility]    I will send a fax cover sheet stating what we are needing.

## 2017-11-15 NOTE — PROGRESS NOTES
MONTANA Montez  Sunshine Her  : 1945  DATE:10/10/2017    Patient Active Problem List   Diagnosis   • Generalized weakness   • Acute on chronic renal insufficiency   • Anemia   • Symptomatic bradycardia   • Leukopenia   • Hypothyroid   • Bipolar affective disorder   • Kidney function abnormal   • COPD (chronic obstructive pulmonary disease)   • CVA (cerebral vascular accident)-Right Occipital/Temporal   • Chronic kidney disease (CKD), stage III (moderate)   • Dementia   • Vertigo   • History of stroke   • Iron deficiency anemia   • CKD (chronic kidney disease), stage III   • Vascular dementia   • Hypertension   • Hyperlipidemia   • Tobacco abuse   • Malignant hypertension   • Orthostasis   • Impaired functional mobility, balance, gait, and endurance   • Sepsis   • Paroxysmal atrial fibrillation   • Dyslipidemia       Dear MONTANA Montez:    Subjective     The patient has dementia as well as right sided hemiparesis so she is unable to provide any history.  The history was obtained from the patient's caretaker at her side.    Sunshine Her is a 72 y.o. female with the above medical problems who is being seen for Follow-up.  A review of some records obtained from the [facility] suggest the patient had a ischemic stroke approximately 4 months ago.  At that time she was started on aspirin and Plavix.  She has a history of atrial fibrillation which is also concerning for a to be the callus of the patient's stroke.  In addition to these the echocardiogram finding a possible PFO.  According to the patient's caretaker she has been doing relatively well since her last visit.      Current Outpatient Prescriptions:   •  acetaminophen (TYLENOL) 500 MG tablet, 500 mg by Per PEG Tube route Every 4 (Four) Hours As Needed for Mild Pain (1-3)., Disp: , Rfl:   •  amLODIPine (NORVASC) 5 MG tablet, 2 tablets by Per G Tube route Daily., Disp: 30 tablet, Rfl: 6  •  atenolol (TENORMIN) 25 MG  tablet, Take 25 mg by mouth Daily., Disp: , Rfl:   •  atorvastatin (LIPITOR) 40 MG tablet, Take 1 tablet by mouth Daily. (Patient taking differently: 40 mg by Per PEG Tube route Every Night.), Disp: , Rfl:   •  clopidogrel (PLAVIX) 75 MG tablet, Take 75 mg by mouth Daily., Disp: , Rfl:   •  donepezil (ARICEPT) 10 MG tablet, Take 10 mg by mouth Every Night., Disp: , Rfl:   •  DULoxetine (CYMBALTA) 60 MG capsule, Take 60 mg by mouth At Night As Needed., Disp: , Rfl:   •  ferrous sulfate (IRON SUPPLEMENT) 325 (65 FE) MG tablet, Take 1 tablet by mouth 2 (Two) Times a Day With Meals. (Patient taking differently: 325 mg by Per PEG Tube route Daily With Breakfast.), Disp: 60 tablet, Rfl: 0  •  levothyroxine (SYNTHROID, LEVOTHROID) 25 MCG tablet, 25 mcg by Per PEG Tube route Daily., Disp: , Rfl:   •  loratadine (CLARITIN) 10 MG tablet, Take 10 mg by mouth Daily., Disp: , Rfl:   •  memantine (NAMENDA) 10 MG tablet, Take 1 tablet by mouth 2 (two) times a day. (Patient taking differently: 10 mg by Per PEG Tube route 2 (Two) Times a Day.), Disp: 60 tablet, Rfl: 1  •  mirtazapine (REMERON) 30 MG tablet, 30 mg by Per PEG Tube route Every Night., Disp: , Rfl:   •  pantoprazole (PROTONIX) 40 MG EC tablet, Take 1 tablet by mouth 2 (Two) Times a Day Before Meals. (Patient taking differently: 40 mg by Per PEG Tube route Daily.), Disp: 60 tablet, Rfl: 0  •  QUEtiapine XR (SEROquel XR) 150 MG 24 hr tablet, Take 150 mg by mouth Every Night., Disp: , Rfl:   •  sertraline (ZOLOFT) 50 MG tablet, 1 tablet by Per G Tube route Daily for 335 doses., Disp: , Rfl:   •  apixaban (ELIQUIS) 5 MG tablet tablet, Take 1 tablet by mouth 2 (Two) Times a Day., Disp: 60 tablet, Rfl: 4    The following portions of the patient's history were reviewed and updated as appropriate: allergies, current medications, past family history, past medical history, past social history, past surgical history and problem list.    Review of Systems   Constitution: Negative  "for chills, diaphoresis, fever, weakness, malaise/fatigue, weight gain and weight loss.   HENT: Negative for congestion, ear discharge, ear pain, hearing loss, hoarse voice, nosebleeds, sore throat and tinnitus.    Eyes: Negative for blurred vision, discharge, double vision, pain and redness.   Cardiovascular: Positive for dyspnea on exertion and leg swelling (right hand and foot). Negative for chest pain, irregular heartbeat, orthopnea, palpitations and paroxysmal nocturnal dyspnea.   Respiratory: Positive for wheezing. Negative for cough, hemoptysis and shortness of breath.    Endocrine: Negative for cold intolerance, heat intolerance, polydipsia, polyphagia and polyuria.   Hematologic/Lymphatic: Negative for bleeding problem. Does not bruise/bleed easily.        Anemia   Skin: Negative for color change, dry skin, flushing, itching and rash.   Musculoskeletal: Positive for arthritis. Negative for back pain, joint pain, joint swelling, muscle cramps, muscle weakness, myalgias, neck pain and stiffness.   Gastrointestinal: Negative for abdominal pain, change in bowel habit, constipation, diarrhea, heartburn, nausea and vomiting.   Genitourinary: Negative for bladder incontinence, decreased libido, dysuria, frequency and hematuria.   Neurological: Positive for dizziness and light-headedness. Negative for disturbances in coordination, focal weakness, headaches, loss of balance, numbness, paresthesias and tremors.        Hx strokes x3   Psychiatric/Behavioral: Negative for altered mental status, depression and memory loss. The patient does not have insomnia.    Allergic/Immunologic: Negative for hives.       Objective   Blood pressure 136/60, pulse 60, resp. rate 16, height 67\" (170.2 cm), weight 180 lb 6.4 oz (81.8 kg), SpO2 94 %.    Physical Exam   Constitutional: She is oriented to person, place, and time. She appears well-developed and well-nourished.   WF sitting on wheelchair.   HENT:   Mouth/Throat: Oropharynx is " clear and moist.   Eyes: EOM are normal. Pupils are equal, round, and reactive to light.   Neck: Neck supple. No JVD present. No tracheal deviation present. No thyromegaly present.   Cardiovascular: Normal rate, regular rhythm, S1 normal and S2 normal.  Exam reveals no gallop and no friction rub.    No murmur heard.  Pulmonary/Chest: Effort normal and breath sounds normal. No respiratory distress. She has no wheezes. She has no rales.   Abdominal: Soft. Bowel sounds are normal. She exhibits no mass. There is no tenderness.   Musculoskeletal: Normal range of motion. She exhibits no edema.   Lymphadenopathy:     She has no cervical adenopathy.   Neurological: She is alert and oriented to person, place, and time.   Right sided hemiparesis.   Skin: Skin is warm and dry. No rash noted.   Psychiatric: She has a normal mood and affect.       Procedures    Assessment/Plan      1.  Paroxysmal atrial fibrillation: The patient is currently maintaining sinus rhythm.  Since the patient has a history of atrial fibrillation and had a recent stroke she is at high risk for repeat stroke in the future.  She has a CHADS VASC score of 6.  I discussed with the patient's caretaker the option of starting her on a blood thinner.  She is agreeable to this.  At this point will start on Eliquis 5 mg by mouth twice a day and monitor for stability.  We'll need to monitor for evidence of bleeding.  I advised the caretaker of the importance of fall precautions in the setting of blood thinner administration.    2.  Hypertension: Patient with a history of hypertension which is currently well controlled.  At this point we'll continue current regimen.    3.  Dyslipidemia: Patient with a history of dyslipidemia with no recent lipid panel record.  We will request.       Diagnosis Plan   1. Paroxysmal atrial fibrillation     2. Essential hypertension     3. Dyslipidemia  Lipid Panel          Return in about 3 months (around 2/15/2018).    I appreciate  the opportunity to participate in this patient's cardiovascular care.    Best Regards    Bautista Melissa

## 2017-12-12 ENCOUNTER — TELEPHONE (OUTPATIENT)
Dept: CARDIOLOGY | Facility: CLINIC | Age: 72
End: 2017-12-12

## 2017-12-12 NOTE — TELEPHONE ENCOUNTER
Alanis called and wanted to know if you wanted Sunshine to take Plavix and Eliquis together.     I also resent in her Eliquis to [facility] in [place] she stated she doesn't use the [facility] anymore.

## 2017-12-12 NOTE — TELEPHONE ENCOUNTER
She is able to take Plavix and Ahlquist together but I did not prescribe Plavix.  This has been prescribed due to her history of strokes.  This would need to be evaluated by neurology.  She has no cardiac indication for Plavix.

## 2017-12-20 ENCOUNTER — APPOINTMENT (OUTPATIENT)
Dept: GENERAL RADIOLOGY | Facility: HOSPITAL | Age: 72
End: 2017-12-20

## 2017-12-20 ENCOUNTER — HOSPITAL ENCOUNTER (EMERGENCY)
Facility: HOSPITAL | Age: 72
Discharge: HOME | End: 2017-12-20
Attending: FAMILY MEDICINE | Admitting: EMERGENCY MEDICINE

## 2017-12-20 VITALS
TEMPERATURE: 98.2 F | BODY MASS INDEX: 28.72 KG/M2 | OXYGEN SATURATION: 98 % | HEART RATE: 78 BPM | DIASTOLIC BLOOD PRESSURE: 72 MMHG | RESPIRATION RATE: 16 BRPM | SYSTOLIC BLOOD PRESSURE: 142 MMHG | HEIGHT: 67 IN | WEIGHT: 183 LBS

## 2017-12-20 DIAGNOSIS — I48.0 PAROXYSMAL ATRIAL FIBRILLATION (HCC): ICD-10-CM

## 2017-12-20 DIAGNOSIS — I10 ESSENTIAL HYPERTENSION: ICD-10-CM

## 2017-12-20 DIAGNOSIS — J18.9 COMMUNITY ACQUIRED PNEUMONIA OF RIGHT LOWER LOBE OF LUNG: Primary | ICD-10-CM

## 2017-12-20 LAB
A-A DO2: 34.4 MMHG (ref 0–300)
ALBUMIN SERPL-MCNC: 4.5 G/DL (ref 3.4–4.8)
ALBUMIN/GLOB SERPL: 1.3 G/DL (ref 1.5–2.5)
ALP SERPL-CCNC: 159 U/L (ref 35–104)
ALT SERPL W P-5'-P-CCNC: 33 U/L (ref 10–36)
ANION GAP SERPL CALCULATED.3IONS-SCNC: 8.5 MMOL/L (ref 3.6–11.2)
APTT PPP: 31.5 SECONDS (ref 23.8–36.1)
ARTERIAL PATENCY WRIST A: ABNORMAL
AST SERPL-CCNC: 27 U/L (ref 10–30)
ATMOSPHERIC PRESS: 725 MMHG
BACTERIA UR QL AUTO: ABNORMAL /HPF
BASE EXCESS BLDA CALC-SCNC: -4.4 MMOL/L
BASOPHILS # BLD AUTO: 0.04 10*3/MM3 (ref 0–0.3)
BASOPHILS NFR BLD AUTO: 0.5 % (ref 0–2)
BDY SITE: ABNORMAL
BILIRUB SERPL-MCNC: 0.2 MG/DL (ref 0.2–1.8)
BILIRUB UR QL STRIP: NEGATIVE
BNP SERPL-MCNC: 40 PG/ML (ref 0–100)
BODY TEMPERATURE: 98.6 C
BUN BLD-MCNC: 26 MG/DL (ref 7–21)
BUN/CREAT SERPL: 16.7 (ref 7–25)
CALCIUM SPEC-SCNC: 9.5 MG/DL (ref 7.7–10)
CHLORIDE SERPL-SCNC: 108 MMOL/L (ref 99–112)
CK MB SERPL-CCNC: 0.72 NG/ML (ref 0–5)
CK MB SERPL-RTO: 0.8 % (ref 0–3)
CK SERPL-CCNC: 94 U/L (ref 24–173)
CLARITY UR: ABNORMAL
CO2 SERPL-SCNC: 24.5 MMOL/L (ref 24.3–31.9)
COHGB MFR BLD: 2.1 % (ref 0–5)
COLOR UR: YELLOW
CREAT BLD-MCNC: 1.56 MG/DL (ref 0.43–1.29)
CRP SERPL-MCNC: 3.7 MG/DL (ref 0–0.99)
D-LACTATE SERPL-SCNC: 1.7 MMOL/L (ref 0.5–2)
DEPRECATED RDW RBC AUTO: 48.2 FL (ref 37–54)
EOSINOPHIL # BLD AUTO: 0.19 10*3/MM3 (ref 0–0.7)
EOSINOPHIL NFR BLD AUTO: 2.1 % (ref 0–7)
ERYTHROCYTE [DISTWIDTH] IN BLOOD BY AUTOMATED COUNT: 14.9 % (ref 11.5–14.5)
GFR SERPL CREATININE-BSD FRML MDRD: 33 ML/MIN/1.73
GLOBULIN UR ELPH-MCNC: 3.4 GM/DL
GLUCOSE BLD-MCNC: 140 MG/DL (ref 70–110)
GLUCOSE UR STRIP-MCNC: NEGATIVE MG/DL
HCO3 BLDA-SCNC: 20 MMOL/L (ref 22–26)
HCT VFR BLD AUTO: 31.7 % (ref 37–47)
HCT VFR BLD CALC: 27 % (ref 37–47)
HGB BLD-MCNC: 9.4 G/DL (ref 12–16)
HGB BLDA-MCNC: 9.1 G/DL (ref 12–16)
HGB UR QL STRIP.AUTO: ABNORMAL
HOROWITZ INDEX BLD+IHG-RTO: 21 %
HYALINE CASTS UR QL AUTO: ABNORMAL /LPF
IMM GRANULOCYTES # BLD: 0.09 10*3/MM3 (ref 0–0.03)
IMM GRANULOCYTES NFR BLD: 1 % (ref 0–0.5)
INR PPP: 1.01 (ref 0.9–1.1)
KETONES UR QL STRIP: NEGATIVE
LEUKOCYTE ESTERASE UR QL STRIP.AUTO: ABNORMAL
LYMPHOCYTES # BLD AUTO: 1.43 10*3/MM3 (ref 1–3)
LYMPHOCYTES NFR BLD AUTO: 16.2 % (ref 16–46)
MCH RBC QN AUTO: 26.9 PG (ref 27–33)
MCHC RBC AUTO-ENTMCNC: 29.7 G/DL (ref 33–37)
MCV RBC AUTO: 90.8 FL (ref 80–94)
METHGB BLD QL: 0.5 % (ref 0–3)
MODALITY: ABNORMAL
MONOCYTES # BLD AUTO: 0.61 10*3/MM3 (ref 0.1–0.9)
MONOCYTES NFR BLD AUTO: 6.9 % (ref 0–12)
NEUTROPHILS # BLD AUTO: 6.48 10*3/MM3 (ref 1.4–6.5)
NEUTROPHILS NFR BLD AUTO: 73.3 % (ref 40–75)
NITRITE UR QL STRIP: NEGATIVE
OSMOLALITY SERPL CALC.SUM OF ELEC: 288.3 MOSM/KG (ref 273–305)
OXYHGB MFR BLDV: 90.9 % (ref 85–100)
PCO2 BLDA: 34.1 MM HG (ref 35–45)
PH BLDA: 7.39 PH UNITS (ref 7.35–7.45)
PH UR STRIP.AUTO: 5.5 [PH] (ref 5–8)
PLATELET # BLD AUTO: 244 10*3/MM3 (ref 130–400)
PMV BLD AUTO: 9.6 FL (ref 6–10)
PO2 BLDA: 67.1 MM HG (ref 80–100)
POTASSIUM BLD-SCNC: 4.1 MMOL/L (ref 3.5–5.3)
PROT SERPL-MCNC: 7.9 G/DL (ref 6–8)
PROT UR QL STRIP: ABNORMAL
PROTHROMBIN TIME: 13.4 SECONDS (ref 11–15.4)
RBC # BLD AUTO: 3.49 10*6/MM3 (ref 4.2–5.4)
RBC # UR: ABNORMAL /HPF
REF LAB TEST METHOD: ABNORMAL
SAO2 % BLDCOA: 93.3 % (ref 90–100)
SODIUM BLD-SCNC: 141 MMOL/L (ref 135–153)
SP GR UR STRIP: 1.02 (ref 1–1.03)
SQUAMOUS #/AREA URNS HPF: ABNORMAL /HPF
TROPONIN I SERPL-MCNC: 0.01 NG/ML
TROPONIN I SERPL-MCNC: <0.006 NG/ML
UROBILINOGEN UR QL STRIP: ABNORMAL
WBC NRBC COR # BLD: 8.84 10*3/MM3 (ref 4.5–12.5)
WBC UR QL AUTO: ABNORMAL /HPF
YEAST URNS QL MICRO: ABNORMAL /HPF

## 2017-12-20 PROCEDURE — 81001 URINALYSIS AUTO W/SCOPE: CPT | Performed by: FAMILY MEDICINE

## 2017-12-20 PROCEDURE — 96365 THER/PROPH/DIAG IV INF INIT: CPT

## 2017-12-20 PROCEDURE — 87186 SC STD MICRODIL/AGAR DIL: CPT | Performed by: FAMILY MEDICINE

## 2017-12-20 PROCEDURE — 25010000002 METHYLPREDNISOLONE PER 125 MG: Performed by: FAMILY MEDICINE

## 2017-12-20 PROCEDURE — 85610 PROTHROMBIN TIME: CPT | Performed by: FAMILY MEDICINE

## 2017-12-20 PROCEDURE — 71010 HC CHEST PA OR AP: CPT

## 2017-12-20 PROCEDURE — 99284 EMERGENCY DEPT VISIT MOD MDM: CPT

## 2017-12-20 PROCEDURE — 83605 ASSAY OF LACTIC ACID: CPT | Performed by: FAMILY MEDICINE

## 2017-12-20 PROCEDURE — 93005 ELECTROCARDIOGRAM TRACING: CPT | Performed by: FAMILY MEDICINE

## 2017-12-20 PROCEDURE — 36415 COLL VENOUS BLD VENIPUNCTURE: CPT

## 2017-12-20 PROCEDURE — 82805 BLOOD GASES W/O2 SATURATION: CPT | Performed by: FAMILY MEDICINE

## 2017-12-20 PROCEDURE — 86140 C-REACTIVE PROTEIN: CPT | Performed by: FAMILY MEDICINE

## 2017-12-20 PROCEDURE — 87077 CULTURE AEROBIC IDENTIFY: CPT | Performed by: FAMILY MEDICINE

## 2017-12-20 PROCEDURE — 87040 BLOOD CULTURE FOR BACTERIA: CPT | Performed by: FAMILY MEDICINE

## 2017-12-20 PROCEDURE — 93010 ELECTROCARDIOGRAM REPORT: CPT | Performed by: INTERNAL MEDICINE

## 2017-12-20 PROCEDURE — 84484 ASSAY OF TROPONIN QUANT: CPT | Performed by: FAMILY MEDICINE

## 2017-12-20 PROCEDURE — 82553 CREATINE MB FRACTION: CPT | Performed by: FAMILY MEDICINE

## 2017-12-20 PROCEDURE — 85025 COMPLETE CBC W/AUTO DIFF WBC: CPT | Performed by: FAMILY MEDICINE

## 2017-12-20 PROCEDURE — 71010 XR CHEST 1 VW: CPT | Performed by: RADIOLOGY

## 2017-12-20 PROCEDURE — 25010000002 CEFTRIAXONE PER 250 MG: Performed by: FAMILY MEDICINE

## 2017-12-20 PROCEDURE — 83880 ASSAY OF NATRIURETIC PEPTIDE: CPT | Performed by: FAMILY MEDICINE

## 2017-12-20 PROCEDURE — 82375 ASSAY CARBOXYHB QUANT: CPT | Performed by: FAMILY MEDICINE

## 2017-12-20 PROCEDURE — 87086 URINE CULTURE/COLONY COUNT: CPT | Performed by: FAMILY MEDICINE

## 2017-12-20 PROCEDURE — 94799 UNLISTED PULMONARY SVC/PX: CPT

## 2017-12-20 PROCEDURE — 80053 COMPREHEN METABOLIC PANEL: CPT | Performed by: FAMILY MEDICINE

## 2017-12-20 PROCEDURE — 85730 THROMBOPLASTIN TIME PARTIAL: CPT | Performed by: FAMILY MEDICINE

## 2017-12-20 PROCEDURE — 94640 AIRWAY INHALATION TREATMENT: CPT

## 2017-12-20 PROCEDURE — 96367 TX/PROPH/DG ADDL SEQ IV INF: CPT

## 2017-12-20 PROCEDURE — 83050 HGB METHEMOGLOBIN QUAN: CPT | Performed by: FAMILY MEDICINE

## 2017-12-20 PROCEDURE — 36600 WITHDRAWAL OF ARTERIAL BLOOD: CPT | Performed by: FAMILY MEDICINE

## 2017-12-20 PROCEDURE — 25010000002 AZITHROMYCIN: Performed by: FAMILY MEDICINE

## 2017-12-20 PROCEDURE — 82550 ASSAY OF CK (CPK): CPT | Performed by: FAMILY MEDICINE

## 2017-12-20 PROCEDURE — 96375 TX/PRO/DX INJ NEW DRUG ADDON: CPT

## 2017-12-20 RX ORDER — METHYLPREDNISOLONE SODIUM SUCCINATE 125 MG/2ML
125 INJECTION, POWDER, LYOPHILIZED, FOR SOLUTION INTRAMUSCULAR; INTRAVENOUS ONCE
Status: COMPLETED | OUTPATIENT
Start: 2017-12-20 | End: 2017-12-20

## 2017-12-20 RX ORDER — SODIUM CHLORIDE 0.9 % (FLUSH) 0.9 %
10 SYRINGE (ML) INJECTION AS NEEDED
Status: DISCONTINUED | OUTPATIENT
Start: 2017-12-20 | End: 2017-12-20 | Stop reason: HOSPADM

## 2017-12-20 RX ORDER — IPRATROPIUM BROMIDE AND ALBUTEROL SULFATE 2.5; .5 MG/3ML; MG/3ML
3 SOLUTION RESPIRATORY (INHALATION) ONCE
Status: COMPLETED | OUTPATIENT
Start: 2017-12-20 | End: 2017-12-20

## 2017-12-20 RX ORDER — DOXYCYCLINE 100 MG/1
100 CAPSULE ORAL 2 TIMES DAILY
Qty: 20 CAPSULE | Refills: 0 | Status: SHIPPED | OUTPATIENT
Start: 2017-12-20 | End: 2018-02-21 | Stop reason: ALTCHOICE

## 2017-12-20 RX ORDER — ALBUTEROL SULFATE 90 UG/1
2 AEROSOL, METERED RESPIRATORY (INHALATION) EVERY 6 HOURS PRN
Qty: 1 INHALER | Refills: 0 | Status: SHIPPED | OUTPATIENT
Start: 2017-12-20

## 2017-12-20 RX ORDER — FERROUS SULFATE 325(65) MG
325 TABLET ORAL 2 TIMES DAILY WITH MEALS
Qty: 60 TABLET | Refills: 0 | Status: SHIPPED | OUTPATIENT
Start: 2017-12-20

## 2017-12-20 RX ORDER — GUAIFENESIN 600 MG/1
1200 TABLET, EXTENDED RELEASE ORAL 2 TIMES DAILY
Qty: 20 TABLET | Refills: 0 | Status: SHIPPED | OUTPATIENT
Start: 2017-12-20 | End: 2018-02-21 | Stop reason: ALTCHOICE

## 2017-12-20 RX ADMIN — CEFTRIAXONE 2 G: 2 INJECTION, SOLUTION INTRAVENOUS at 08:25

## 2017-12-20 RX ADMIN — METHYLPREDNISOLONE SODIUM SUCCINATE 125 MG: 125 INJECTION, POWDER, FOR SOLUTION INTRAMUSCULAR; INTRAVENOUS at 05:53

## 2017-12-20 RX ADMIN — AZITHROMYCIN 500 MG: 500 INJECTION, POWDER, LYOPHILIZED, FOR SOLUTION INTRAVENOUS at 09:31

## 2017-12-20 RX ADMIN — IPRATROPIUM BROMIDE AND ALBUTEROL SULFATE 3 ML: .5; 3 SOLUTION RESPIRATORY (INHALATION) at 05:33

## 2017-12-20 NOTE — ED PROVIDER NOTES
Subjective   HPI Comments: 73yo presents with daughter who reports SOB, cough and congestion that has been worsening since 12/14- pt seen at urgent care and diagnosed with LLL pneumonia has been on antibiotics with no improvement; daughter reports that pt has bad 3 CVa's last on 6/12/17 that left her aphasic ; daughter says she has a terrible cough that was worse last pm so she brought her in to be evaluated    Patient is a 72 y.o. female presenting with shortness of breath.   History provided by:  Relative  Shortness of Breath   Severity:  Moderate  Onset quality:  Gradual  Timing:  Constant  Chronicity:  New  Context: activity    Relieved by:  Nothing  Worsened by:  Activity and coughing  Ineffective treatments:  Rest  Associated symptoms: cough    Associated symptoms: no headaches    Risk factors: no recent alcohol use, no family hx of DVT, no obesity, no oral contraceptive use, no recent surgery and no tobacco use        Review of Systems   Constitutional: Negative for activity change, appetite change and fatigue.   Eyes: Negative for discharge.   Respiratory: Positive for cough and shortness of breath.    Endocrine: Negative for cold intolerance and heat intolerance.   Genitourinary: Negative for difficulty urinating and dyspareunia.   Allergic/Immunologic: Negative for environmental allergies and food allergies.   Neurological: Negative for dizziness, facial asymmetry and headaches.   All other systems reviewed and are negative.      Past Medical History:   Diagnosis Date   • Acute on chronic renal insufficiency 7/15/2016   • Anemia    • Anxiety    • Arthritis    • Arthritis    • Bipolar 1 disorder    • Bradycardia    • COPD (chronic obstructive pulmonary disease)    • Coronary artery disease    • Dementia    • Disease of thyroid gland    • Generalized weakness 7/14/2016   • GERD (gastroesophageal reflux disease)    • Heart disease    • History of transfusion    • Hyperlipidemia    • Hypertension    • PAD  "(peripheral artery disease)    • Stroke    • TIA (transient ischemic attack)    • Vertigo        No Known Allergies    Past Surgical History:   Procedure Laterality Date   • ANGIOPLASTY SUBCLAVIAN ARTERY     • CAROTID ENDARTERECTOMY Bilateral    • COLONOSCOPY  2012   • COLONOSCOPY N/A 2/24/2017    Procedure: COLONOSCOPY  CPTCODE:44907;  Surgeon: Suraj Quintanilla III, MD;  Location: [facility];  Service:    • ENDOSCOPY     • ENDOSCOPY N/A 2/24/2017    Procedure: ESOPHAGOGASTRODUODENOSCOPY WITH BIOPSY  CPTCODE:27698;  Surgeon: Suraj Quintanilla III, MD;  Location: [facility];  Service:    • ENDOSCOPY W/ PEG TUBE PLACEMENT N/A 6/28/2017    Procedure: ESOPHAGOGASTRODUODENOSCOPY WITH PERCUTANEOUS ENDOSCOPIC GASTROSTOMY TUBE INSERTION;  Surgeon: Renan Montanez MD;  Location: [facility];  Service:    • EYE SURGERY      bilat cataracts   • RENAL ARTERY BYPASS         Family History   Problem Relation Age of Onset   • Heart disease Mother    • Diabetes Mother    • Cancer Father    • Diabetes Sister    • Cancer Brother        Social History     Social History   • Marital status:      Spouse name: N/A   • Number of children: N/A   • Years of education: N/A     Social History Main Topics   • Smoking status: Former Smoker     Packs/day: 1.00     Years: 40.00     Types: Cigarettes     Quit date: 2/6/2017   • Smokeless tobacco: Never Used   • Alcohol use No      Comment: \"used to drink a lot\" none now  (per daughter)   • Drug use: No   • Sexual activity: Defer     Other Topics Concern   • None     Social History Narrative    Currently lives in [facility] with daughter.            Objective   Physical Exam   Constitutional: She appears well-developed and well-nourished. No distress.   HENT:   Head: Normocephalic.   Right Ear: External ear normal.   Left Ear: External ear normal.   Mouth/Throat: Oropharynx is clear and moist.   Eyes: EOM are normal. Pupils are equal, round, and reactive to light.   Neck: Neck supple. "   Cardiovascular: Normal rate and regular rhythm.    Pulmonary/Chest: Effort normal. She has decreased breath sounds in the right lower field and the left lower field.   Abdominal: Soft. Bowel sounds are normal. There is no rebound and no guarding.   Musculoskeletal: Normal range of motion.   Neurological: She is alert.   Skin: Skin is warm. She is not diaphoretic.   Nursing note and vitals reviewed.      Procedures  Results for orders placed or performed during the hospital encounter of 12/20/17   Comprehensive Metabolic Panel   Result Value Ref Range    Glucose 140 (H) 70 - 110 mg/dL    BUN 26 (H) 7 - 21 mg/dL    Creatinine 1.56 (H) 0.43 - 1.29 mg/dL    Sodium 141 135 - 153 mmol/L    Potassium 4.1 3.5 - 5.3 mmol/L    Chloride 108 99 - 112 mmol/L    CO2 24.5 24.3 - 31.9 mmol/L    Calcium 9.5 7.7 - 10.0 mg/dL    Total Protein 7.9 6.0 - 8.0 g/dL    Albumin 4.50 3.40 - 4.80 g/dL    ALT (SGPT) 33 10 - 36 U/L    AST (SGOT) 27 10 - 30 U/L    Alkaline Phosphatase 159 (H) 35 - 104 U/L    Total Bilirubin 0.2 0.2 - 1.8 mg/dL    eGFR Non African Amer 33 (L) >60 mL/min/1.73    Globulin 3.4 gm/dL    A/G Ratio 1.3 (L) 1.5 - 2.5 g/dL    BUN/Creatinine Ratio 16.7 7.0 - 25.0    Anion Gap 8.5 3.6 - 11.2 mmol/L   Protime-INR   Result Value Ref Range    Protime 13.4 11.0 - 15.4 Seconds    INR 1.01 0.90 - 1.10   aPTT   Result Value Ref Range    PTT 31.5 23.8 - 36.1 seconds   Urinalysis With / Culture If Indicated - Urine, Clean Catch   Result Value Ref Range    Color, UA Yellow Yellow, Straw    Appearance, UA Cloudy (A) Clear    pH, UA 5.5 5.0 - 8.0    Specific Gravity, UA 1.021 1.005 - 1.030    Glucose, UA Negative Negative    Ketones, UA Negative Negative    Bilirubin, UA Negative Negative    Blood, UA Moderate (2+) (A) Negative    Protein, UA 30 mg/dL (1+) (A) Negative    Leuk Esterase, UA Small (1+) (A) Negative    Nitrite, UA Negative Negative    Urobilinogen, UA 0.2 E.U./dL 0.2 - 1.0 E.U./dL   Blood Gas, Arterial   Result Value  Ref Range    Site Arterial: right brachial     Juan's Test N/A     pH, Arterial 7.386 7.350 - 7.450 pH units    pCO2, Arterial 34.1 (L) 35.0 - 45.0 mm Hg    pO2, Arterial 67.1 (L) 80.0 - 100.0 mm Hg    HCO3, Arterial 20.0 (L) 22.0 - 26.0 mmol/L    Base Excess, Arterial -4.4 mmol/L    O2 Saturation, Arterial 93.3 90.0 - 100.0 %    Hemoglobin, Blood Gas 9.1 (L) 12 - 16 g/dL    Hematocrit, Blood Gas 27.0 (L) 37.0 - 47.0 %    Oxyhemoglobin 90.9 85 - 100 %    Methemoglobin 0.50 0.00 - 3.00 %    Carboxyhemoglobin 2.1 0 - 5 %    A-a Gradiant 34.4 0.0 - 300.0 mmHg    Temperature 98.6 C    Barometric Pressure for Blood Gas 725 mmHg    Modality Room Air     FIO2 21 %   Troponin   Result Value Ref Range    Troponin I 0.008 <=0.040 ng/mL   BNP   Result Value Ref Range    BNP 40.0 0.0 - 100.0 pg/mL   Lactic Acid, Plasma   Result Value Ref Range    Lactate 1.7 0.5 - 2.0 mmol/L   C-reactive Protein   Result Value Ref Range    C-Reactive Protein 3.70 (H) 0.00 - 0.99 mg/dL   CBC Auto Differential   Result Value Ref Range    WBC 8.84 4.50 - 12.50 10*3/mm3    RBC 3.49 (L) 4.20 - 5.40 10*6/mm3    Hemoglobin 9.4 (L) 12.0 - 16.0 g/dL    Hematocrit 31.7 (L) 37.0 - 47.0 %    MCV 90.8 80.0 - 94.0 fL    MCH 26.9 (L) 27.0 - 33.0 pg    MCHC 29.7 (L) 33.0 - 37.0 g/dL    RDW 14.9 (H) 11.5 - 14.5 %    RDW-SD 48.2 37.0 - 54.0 fl    MPV 9.6 6.0 - 10.0 fL    Platelets 244 130 - 400 10*3/mm3    Neutrophil % 73.3 40.0 - 75.0 %    Lymphocyte % 16.2 16.0 - 46.0 %    Monocyte % 6.9 0.0 - 12.0 %    Eosinophil % 2.1 0.0 - 7.0 %    Basophil % 0.5 0.0 - 2.0 %    Immature Grans % 1.0 (H) 0.0 - 0.5 %    Neutrophils, Absolute 6.48 1.40 - 6.50 10*3/mm3    Lymphocytes, Absolute 1.43 1.00 - 3.00 10*3/mm3    Monocytes, Absolute 0.61 0.10 - 0.90 10*3/mm3    Eosinophils, Absolute 0.19 0.00 - 0.70 10*3/mm3    Basophils, Absolute 0.04 0.00 - 0.30 10*3/mm3    Immature Grans, Absolute 0.09 (H) 0.00 - 0.03 10*3/mm3   Osmolality, Calculated   Result Value Ref Range     Osmolality Calc 288.3 273.0 - 305.0 mOsm/kg   Troponin   Result Value Ref Range    Troponin I <0.006 <=0.040 ng/mL   CK   Result Value Ref Range    Creatine Kinase 94 24 - 173 U/L   CK-MB   Result Value Ref Range    CKMB 0.72 0.00 - 5.00 ng/mL   Urinalysis, Microscopic Only - Urine, Clean Catch   Result Value Ref Range    RBC, UA None Seen None Seen, 0-2 /HPF    WBC, UA 3-6 (A) None Seen, 0-2 /HPF    Bacteria, UA 2+ (A) None Seen /HPF    Squamous Epithelial Cells, UA 0-2 None Seen, 0-2 /HPF    Yeast, UA Small/1+ Yeast None Seen /HPF    Hyaline Casts, UA 0-2 None Seen /LPF    Methodology Manual Light Microscopy    CK-MB Index   Result Value Ref Range    CK-MB Index 0.8 0.0 - 3.0 %     Xr Chest 1 View    Result Date: 12/20/2017  Narrative: EXAMINATION: XR CHEST 1 VW-  CLINICAL INDICATION:     soa  TECHNIQUE:  XR CHEST 1 VW-  COMPARISON: 2/9/2017  FINDINGS: Patchy opacities right midlung noted. Heart and mediastinal contours are unremarkable. No pneumothorax. No pleural effusion. No acute osseous findings.         Impression: Development of patchy right midlung airspace disease.  This report was finalized on 12/20/2017 6:54 AM by Dr. Ke Parmar MD.             ED Course  ED Course   Comment By Time   Endorsed to Dr Woodward at shift change [facility], DO 12/20 7265   Patient feels better.  Hemodynamically stable.  Have discussed possible admission, but the patient wishes to try outpatient treatment.  We will change her antibiotics.  Have also discussed with her the importance of physical and speech therapy, and she will discuss this with her primary care.  They understand to return if she should have worsening symptoms.  Her daughter who accompanies her agrees with plan. Tad Woodward MD 12/20 1203                  MDM  Number of Diagnoses or Management Options  Community acquired pneumonia of right lower lobe of lung:   Essential hypertension:   Paroxysmal atrial fibrillation:      Amount and/or Complexity  of Data Reviewed  Clinical lab tests: reviewed  Tests in the radiology section of CPT®: reviewed  Tests in the medicine section of CPT®: reviewed  Decide to obtain previous medical records or to obtain history from someone other than the patient: yes    Risk of Complications, Morbidity, and/or Mortality  Presenting problems: high  Diagnostic procedures: moderate  Management options: high        Final diagnoses:   Community acquired pneumonia of right lower lobe of lung   Paroxysmal atrial fibrillation   Essential hypertension            Tad Woodward MD  12/20/17 1219

## 2017-12-22 ENCOUNTER — TELEPHONE (OUTPATIENT)
Dept: EMERGENCY DEPT | Facility: HOSPITAL | Age: 72
End: 2017-12-22

## 2017-12-22 LAB
BACTERIA SPEC AEROBE CULT: ABNORMAL
BACTERIA SPEC AEROBE CULT: ABNORMAL

## 2017-12-25 LAB
BACTERIA SPEC AEROBE CULT: NORMAL
BACTERIA SPEC AEROBE CULT: NORMAL

## 2018-01-04 ENCOUNTER — APPOINTMENT (OUTPATIENT)
Dept: CT IMAGING | Facility: HOSPITAL | Age: 73
End: 2018-01-04

## 2018-01-04 ENCOUNTER — APPOINTMENT (OUTPATIENT)
Dept: GENERAL RADIOLOGY | Facility: HOSPITAL | Age: 73
End: 2018-01-04

## 2018-01-04 ENCOUNTER — HOSPITAL ENCOUNTER (EMERGENCY)
Facility: HOSPITAL | Age: 73
Discharge: HOME | End: 2018-01-04
Attending: EMERGENCY MEDICINE | Admitting: EMERGENCY MEDICINE

## 2018-01-04 ENCOUNTER — APPOINTMENT (OUTPATIENT)
Dept: ULTRASOUND IMAGING | Facility: HOSPITAL | Age: 73
End: 2018-01-04

## 2018-01-04 VITALS
HEART RATE: 81 BPM | HEIGHT: 67 IN | SYSTOLIC BLOOD PRESSURE: 158 MMHG | OXYGEN SATURATION: 98 % | WEIGHT: 183 LBS | TEMPERATURE: 97.4 F | BODY MASS INDEX: 28.72 KG/M2 | RESPIRATION RATE: 20 BRPM | DIASTOLIC BLOOD PRESSURE: 79 MMHG

## 2018-01-04 DIAGNOSIS — IMO0002 WEAKNESS DUE TO CEREBROVASCULAR ACCIDENT (CVA): Primary | ICD-10-CM

## 2018-01-04 LAB
A-A DO2: 34.6 MMHG (ref 0–300)
ALBUMIN SERPL-MCNC: 4.3 G/DL (ref 3.4–4.8)
ALBUMIN/GLOB SERPL: 1.3 G/DL (ref 1.5–2.5)
ALP SERPL-CCNC: 154 U/L (ref 35–104)
ALT SERPL W P-5'-P-CCNC: 36 U/L (ref 10–36)
ANION GAP SERPL CALCULATED.3IONS-SCNC: 6.8 MMOL/L (ref 3.6–11.2)
ARTERIAL PATENCY WRIST A: ABNORMAL
AST SERPL-CCNC: 33 U/L (ref 10–30)
ATMOSPHERIC PRESS: 727 MMHG
BACTERIA UR QL AUTO: NORMAL /HPF
BASE EXCESS BLDA CALC-SCNC: -0.6 MMOL/L
BASOPHILS # BLD AUTO: 0.02 10*3/MM3 (ref 0–0.3)
BASOPHILS NFR BLD AUTO: 0.3 % (ref 0–2)
BDY SITE: ABNORMAL
BILIRUB SERPL-MCNC: 0.2 MG/DL (ref 0.2–1.8)
BILIRUB UR QL STRIP: NEGATIVE
BODY TEMPERATURE: 98.6 C
BUN BLD-MCNC: 25 MG/DL (ref 7–21)
BUN/CREAT SERPL: 17.5 (ref 7–25)
CALCIUM SPEC-SCNC: 9.4 MG/DL (ref 7.7–10)
CHLORIDE SERPL-SCNC: 105 MMOL/L (ref 99–112)
CLARITY UR: CLEAR
CO2 SERPL-SCNC: 27.2 MMOL/L (ref 24.3–31.9)
COHGB MFR BLD: 1.4 % (ref 0–5)
COLOR UR: YELLOW
CREAT BLD-MCNC: 1.43 MG/DL (ref 0.43–1.29)
CRP SERPL-MCNC: 2.51 MG/DL (ref 0–0.99)
DEPRECATED RDW RBC AUTO: 54 FL (ref 37–54)
EOSINOPHIL # BLD AUTO: 0.25 10*3/MM3 (ref 0–0.7)
EOSINOPHIL NFR BLD AUTO: 3.6 % (ref 0–7)
ERYTHROCYTE [DISTWIDTH] IN BLOOD BY AUTOMATED COUNT: 16.6 % (ref 11.5–14.5)
GFR SERPL CREATININE-BSD FRML MDRD: 36 ML/MIN/1.73
GLOBULIN UR ELPH-MCNC: 3.3 GM/DL
GLUCOSE BLD-MCNC: 113 MG/DL (ref 70–110)
GLUCOSE UR STRIP-MCNC: NEGATIVE MG/DL
HCO3 BLDA-SCNC: 23.3 MMOL/L (ref 22–26)
HCT VFR BLD AUTO: 32.8 % (ref 37–47)
HCT VFR BLD CALC: 29 % (ref 37–47)
HGB BLD-MCNC: 9.5 G/DL (ref 12–16)
HGB BLDA-MCNC: 9.7 G/DL (ref 12–16)
HGB UR QL STRIP.AUTO: NEGATIVE
HOLD SPECIMEN: NORMAL
HOLD SPECIMEN: NORMAL
HOROWITZ INDEX BLD+IHG-RTO: 21 %
HYALINE CASTS UR QL AUTO: NORMAL /LPF
IMM GRANULOCYTES # BLD: 0.07 10*3/MM3 (ref 0–0.03)
IMM GRANULOCYTES NFR BLD: 1 % (ref 0–0.5)
KETONES UR QL STRIP: NEGATIVE
LEUKOCYTE ESTERASE UR QL STRIP.AUTO: NEGATIVE
LIPASE SERPL-CCNC: 30 U/L (ref 13–60)
LYMPHOCYTES # BLD AUTO: 0.89 10*3/MM3 (ref 1–3)
LYMPHOCYTES NFR BLD AUTO: 12.6 % (ref 16–46)
MCH RBC QN AUTO: 25.9 PG (ref 27–33)
MCHC RBC AUTO-ENTMCNC: 29 G/DL (ref 33–37)
MCV RBC AUTO: 89.4 FL (ref 80–94)
METHGB BLD QL: 0.3 % (ref 0–3)
MODALITY: ABNORMAL
MONOCYTES # BLD AUTO: 0.9 10*3/MM3 (ref 0.1–0.9)
MONOCYTES NFR BLD AUTO: 12.8 % (ref 0–12)
NEUTROPHILS # BLD AUTO: 4.91 10*3/MM3 (ref 1.4–6.5)
NEUTROPHILS NFR BLD AUTO: 69.7 % (ref 40–75)
NITRITE UR QL STRIP: NEGATIVE
NRBC BLD MANUAL-RTO: 0 /100 WBC (ref 0–0)
OSMOLALITY SERPL CALC.SUM OF ELEC: 282.7 MOSM/KG (ref 273–305)
OXYHGB MFR BLDV: 91.2 % (ref 85–100)
PCO2 BLDA: 35.4 MM HG (ref 35–45)
PH BLDA: 7.44 PH UNITS (ref 7.35–7.45)
PH UR STRIP.AUTO: 5.5 [PH] (ref 5–8)
PLATELET # BLD AUTO: 203 10*3/MM3 (ref 130–400)
PMV BLD AUTO: 10.1 FL (ref 6–10)
PO2 BLDA: 65.8 MM HG (ref 80–100)
POTASSIUM BLD-SCNC: 4.1 MMOL/L (ref 3.5–5.3)
PROT SERPL-MCNC: 7.6 G/DL (ref 6–8)
PROT UR QL STRIP: ABNORMAL
RBC # BLD AUTO: 3.67 10*6/MM3 (ref 4.2–5.4)
RBC # UR: NORMAL /HPF
REF LAB TEST METHOD: NORMAL
SAO2 % BLDCOA: 92.8 % (ref 90–100)
SODIUM BLD-SCNC: 139 MMOL/L (ref 135–153)
SP GR UR STRIP: 1.02 (ref 1–1.03)
SQUAMOUS #/AREA URNS HPF: NORMAL /HPF
TROPONIN I SERPL-MCNC: 0.02 NG/ML
UROBILINOGEN UR QL STRIP: ABNORMAL
WBC NRBC COR # BLD: 7.04 10*3/MM3 (ref 4.5–12.5)
WBC UR QL AUTO: NORMAL /HPF
WHOLE BLOOD HOLD SPECIMEN: NORMAL
WHOLE BLOOD HOLD SPECIMEN: NORMAL

## 2018-01-04 PROCEDURE — 84484 ASSAY OF TROPONIN QUANT: CPT | Performed by: PHYSICIAN ASSISTANT

## 2018-01-04 PROCEDURE — 83050 HGB METHEMOGLOBIN QUAN: CPT | Performed by: PHYSICIAN ASSISTANT

## 2018-01-04 PROCEDURE — 70450 CT HEAD/BRAIN W/O DYE: CPT | Performed by: RADIOLOGY

## 2018-01-04 PROCEDURE — 93005 ELECTROCARDIOGRAM TRACING: CPT | Performed by: PHYSICIAN ASSISTANT

## 2018-01-04 PROCEDURE — 82805 BLOOD GASES W/O2 SATURATION: CPT | Performed by: PHYSICIAN ASSISTANT

## 2018-01-04 PROCEDURE — 94799 UNLISTED PULMONARY SVC/PX: CPT

## 2018-01-04 PROCEDURE — 94640 AIRWAY INHALATION TREATMENT: CPT

## 2018-01-04 PROCEDURE — 83690 ASSAY OF LIPASE: CPT | Performed by: PHYSICIAN ASSISTANT

## 2018-01-04 PROCEDURE — 99284 EMERGENCY DEPT VISIT MOD MDM: CPT

## 2018-01-04 PROCEDURE — 85025 COMPLETE CBC W/AUTO DIFF WBC: CPT | Performed by: PHYSICIAN ASSISTANT

## 2018-01-04 PROCEDURE — 86140 C-REACTIVE PROTEIN: CPT | Performed by: PHYSICIAN ASSISTANT

## 2018-01-04 PROCEDURE — 76705 ECHO EXAM OF ABDOMEN: CPT | Performed by: RADIOLOGY

## 2018-01-04 PROCEDURE — 96374 THER/PROPH/DIAG INJ IV PUSH: CPT

## 2018-01-04 PROCEDURE — 82375 ASSAY CARBOXYHB QUANT: CPT | Performed by: PHYSICIAN ASSISTANT

## 2018-01-04 PROCEDURE — 36415 COLL VENOUS BLD VENIPUNCTURE: CPT

## 2018-01-04 PROCEDURE — 76705 ECHO EXAM OF ABDOMEN: CPT

## 2018-01-04 PROCEDURE — 25010000002 METHYLPREDNISOLONE PER 125 MG: Performed by: PHYSICIAN ASSISTANT

## 2018-01-04 PROCEDURE — 36600 WITHDRAWAL OF ARTERIAL BLOOD: CPT | Performed by: PHYSICIAN ASSISTANT

## 2018-01-04 PROCEDURE — 71045 X-RAY EXAM CHEST 1 VIEW: CPT

## 2018-01-04 PROCEDURE — 81001 URINALYSIS AUTO W/SCOPE: CPT | Performed by: PHYSICIAN ASSISTANT

## 2018-01-04 PROCEDURE — 80053 COMPREHEN METABOLIC PANEL: CPT | Performed by: PHYSICIAN ASSISTANT

## 2018-01-04 PROCEDURE — 71045 X-RAY EXAM CHEST 1 VIEW: CPT | Performed by: RADIOLOGY

## 2018-01-04 PROCEDURE — 70450 CT HEAD/BRAIN W/O DYE: CPT

## 2018-01-04 RX ORDER — METHYLPREDNISOLONE SODIUM SUCCINATE 125 MG/2ML
60 INJECTION, POWDER, LYOPHILIZED, FOR SOLUTION INTRAMUSCULAR; INTRAVENOUS ONCE
Status: COMPLETED | OUTPATIENT
Start: 2018-01-04 | End: 2018-01-04

## 2018-01-04 RX ORDER — IPRATROPIUM BROMIDE AND ALBUTEROL SULFATE 2.5; .5 MG/3ML; MG/3ML
3 SOLUTION RESPIRATORY (INHALATION) ONCE
Status: COMPLETED | OUTPATIENT
Start: 2018-01-04 | End: 2018-01-04

## 2018-01-04 RX ORDER — SODIUM CHLORIDE 0.9 % (FLUSH) 0.9 %
10 SYRINGE (ML) INJECTION AS NEEDED
Status: DISCONTINUED | OUTPATIENT
Start: 2018-01-04 | End: 2018-01-04 | Stop reason: HOSPADM

## 2018-01-04 RX ADMIN — IPRATROPIUM BROMIDE AND ALBUTEROL SULFATE 3 ML: .5; 3 SOLUTION RESPIRATORY (INHALATION) at 15:46

## 2018-01-04 RX ADMIN — METHYLPREDNISOLONE SODIUM SUCCINATE 60 MG: 125 INJECTION, POWDER, FOR SOLUTION INTRAMUSCULAR; INTRAVENOUS at 15:59

## 2018-01-04 NOTE — ED NOTES
Brought pt a bedside toilet and helped pt provide urine sample.      Linnette Valladares  01/04/18 4459

## 2018-01-05 NOTE — PROGRESS NOTES
Case Management/Social Work    Patient Name:  Sunshine Her  YOB: 1945  MRN: [id number]  Admit Date:  1/4/2018    [facility] received call via on-call stating pt is needing nursing home placement, but does not meet criteria to be admitted to the hospital. [facility] followed up with pt's caregiver, Alanis on this date who states she cannot properly care for the pt at home any longer and is requesting nursing home placement. Pt's caregiver states pt was at [facility] in June/July for rehab and would like pt to be admitted back to [facility] for long term placement. [facility] contacted [facility] per Alissa who states they may have a bed available and to fax pt's information to be reviewed. [facility] contacted pt's PCP, Pati Escobedo and requested them to fax a referral to [facility] and provided them with the fax number to [facility]. [facility] will continue to follow.     Electronically signed by:  Emilee Edwards  01/05/18 11:11 AM

## 2018-01-05 NOTE — ED PROVIDER NOTES
Subjective   Patient is a 72 y.o. female presenting with weakness.   History provided by:  Caregiver  Weakness - Generalized   Severity:  Moderate  Onset quality:  Gradual  Duration:  3 weeks  Timing:  Constant  Progression:  Worsening  Chronicity:  Recurrent  Relieved by:  Nothing  Associated symptoms: difficulty walking, dizziness, lethargy and sensory-motor deficit    Associated symptoms: no abdominal pain, no chest pain, no dysuria, no fever and no loss of consciousness    Risk factors: neurologic disease        Review of Systems   Constitutional: Positive for fatigue. Negative for fever.   HENT: Negative.    Respiratory: Negative.    Cardiovascular: Negative.  Negative for chest pain.   Gastrointestinal: Negative.  Negative for abdominal pain.   Endocrine: Negative.    Genitourinary: Negative.  Negative for dysuria.   Skin: Negative.    Neurological: Positive for dizziness, facial asymmetry and weakness. Negative for loss of consciousness.   Psychiatric/Behavioral: Negative.    All other systems reviewed and are negative.      Past Medical History:   Diagnosis Date   • Acute on chronic renal insufficiency 7/15/2016   • Anemia    • Anxiety    • Arthritis    • Arthritis    • Bipolar 1 disorder    • Bradycardia    • COPD (chronic obstructive pulmonary disease)    • Coronary artery disease    • Dementia    • Disease of thyroid gland    • Generalized weakness 7/14/2016   • GERD (gastroesophageal reflux disease)    • Heart disease    • History of transfusion    • Hyperlipidemia    • Hypertension    • PAD (peripheral artery disease)    • Stroke    • TIA (transient ischemic attack)    • Vertigo        No Known Allergies    Past Surgical History:   Procedure Laterality Date   • ANGIOPLASTY SUBCLAVIAN ARTERY     • CAROTID ENDARTERECTOMY Bilateral    • COLONOSCOPY  2012   • COLONOSCOPY N/A 2/24/2017    Procedure: COLONOSCOPY  CPTCODE:41847;  Surgeon: Suraj Quintanilla III, MD;  Location: [facility];  Service:    •  "ENDOSCOPY     • ENDOSCOPY N/A 2/24/2017    Procedure: ESOPHAGOGASTRODUODENOSCOPY WITH BIOPSY  CPTCODE:09491;  Surgeon: Suraj Quintanilla III, MD;  Location: [facility];  Service:    • ENDOSCOPY W/ PEG TUBE PLACEMENT N/A 6/28/2017    Procedure: ESOPHAGOGASTRODUODENOSCOPY WITH PERCUTANEOUS ENDOSCOPIC GASTROSTOMY TUBE INSERTION;  Surgeon: Renan Montanez MD;  Location: [facility];  Service:    • EYE SURGERY      bilat cataracts   • RENAL ARTERY BYPASS         Family History   Problem Relation Age of Onset   • Heart disease Mother    • Diabetes Mother    • Cancer Father    • Diabetes Sister    • Cancer Brother        Social History     Social History   • Marital status:      Spouse name: N/A   • Number of children: N/A   • Years of education: N/A     Social History Main Topics   • Smoking status: Former Smoker     Packs/day: 1.00     Years: 40.00     Types: Cigarettes     Quit date: 2/6/2017   • Smokeless tobacco: Never Used   • Alcohol use No      Comment: \"used to drink a lot\" none now  (per daughter)   • Drug use: No   • Sexual activity: Defer     Other Topics Concern   • None     Social History Narrative    Currently lives in [facility] with daughter.            Objective   Physical Exam   Constitutional: She is oriented to person, place, and time. She appears well-developed and well-nourished. No distress.   HENT:   Head: Normocephalic and atraumatic.   Nose: Nose normal.   Eyes: Conjunctivae are normal.   Neck: Normal range of motion. Neck supple. No JVD present. No tracheal deviation present.   Cardiovascular: Normal rate, regular rhythm and normal heart sounds.    No murmur heard.  Pulmonary/Chest: Effort normal. No respiratory distress. She has wheezes.   Abdominal: Soft. Bowel sounds are normal. There is no tenderness.   Musculoskeletal: Normal range of motion. She exhibits no edema or deformity.   Neurological: She is alert and oriented to person, place, and time. No cranial nerve deficit. "   Right-sided facial asymmetry.  History of CVA ×3 episodes.   Skin: Skin is warm and dry. No rash noted. She is not diaphoretic. No erythema. No pallor.   Psychiatric: She has a normal mood and affect. Her behavior is normal. Thought content normal.   Nursing note and vitals reviewed.      Procedures         ED Course  ED Course   Comment By Time   CXR rad reviewed:  No evidence of active or acute cardiopulmonary disease on today's chest  radiograph. CONCEPCION Trujillo 01/04 1605   EKG interpreted by Dr. Young: Normal sinus rhythm. CONCEPCION Trujillo 01/04 1639   CT Head rad reviewed:  Low-attenuation in the right occipital region and left parietal region  have an appearance most suggestive of encephalomalacia from previous  insult.  The ventricles, cisterns, and sulci are unremarkable. There is no  hydrocephalus.   There is no evidence of acute ischemia.  I do not see epidural or subdural hematoma.  The gray-white differentiation is appropriate.   The bone window setting images show no destructive calvarial lesion or  acute calvarial fracture.   The posterior fossa is unremarkable. CONCEPCION Trujillo 01/04 1647   US interpreted by virtual radiology: No gallstones or evidence of acute cholecystitis. CONCEPCION Trujillo 01/04 5605   Patient's caregiver continues to approach ER work area saying that patient is weak, and that she is unable to care for her.  Explained to caregiver that from a medical standpoint she would not meet criteria for admission.  Patient caregiver does not feel that she could adequately care for herself if discharged home.  Caregiver stated that patient was eating and drinking and no problem with bowel or bladder habits during initial physical assessment.  Caregiver asked if we could say she wasn't eating or drinking.  Told her that would be an unethical decision. CONCEPCION Trujillo 01/04 0207   Consultation hospitalist Dr. West, explained situation of caregiver as well as results of imaging and labs  that were obtained in ER.  Instructed to call house supervisor to arrange a  consult with patient and caregiver through outpatient . CONCEPCION Trujillo 01/04 1932   Discussed case with [facility], provided information of caregiver's name Alanis Rod as well as contact number for Alanis Rod.   will do a phone interview with caregiver for outpatient placement into inpatient services. CONCEPCION Trujillo 01/04 2039                  MDM  Number of Diagnoses or Management Options  Weakness due to cerebrovascular accident (CVA): established and worsening     Amount and/or Complexity of Data Reviewed  Clinical lab tests: reviewed  Tests in the radiology section of CPT®: reviewed  Obtain history from someone other than the patient: yes  Discuss the patient with other providers: yes    Risk of Complications, Morbidity, and/or Mortality  Presenting problems: moderate  Diagnostic procedures: moderate  Management options: moderate    Patient Progress  Patient progress: stable      Final diagnoses:   Weakness due to cerebrovascular accident (CVA)            CONCEPCION Trujillo  01/05/18 0241

## 2018-01-08 ENCOUNTER — HOSPITAL ENCOUNTER (OUTPATIENT)
Dept: PHYSICAL THERAPY | Facility: HOSPITAL | Age: 73
Setting detail: THERAPIES SERIES
End: 2018-01-08

## 2018-01-08 ENCOUNTER — HOSPITAL ENCOUNTER (OUTPATIENT)
Dept: SPEECH THERAPY | Facility: HOSPITAL | Age: 73
Setting detail: THERAPIES SERIES
End: 2018-01-08

## 2018-01-08 ENCOUNTER — APPOINTMENT (OUTPATIENT)
Dept: OCCUPATIONAL THERAPY | Facility: HOSPITAL | Age: 73
End: 2018-01-08

## 2018-01-09 NOTE — PROGRESS NOTES
Case Management/Social Work    Patient Name:  Sunshine Her  YOB: 1945  MRN: [id number]  Admit Date:  1/4/2018 1/8/18- [facility] who states he does not have any beds available. [facility] contacted pt's caregiver, Alanis and made her aware. Alanis states she would like to speak with the family about other options and will call [facility] back. No other needs at this time.     Electronically signed by:  Emilee Edwards  01/09/18 8:52 AM

## 2018-01-10 ENCOUNTER — HOSPITAL ENCOUNTER (OUTPATIENT)
Dept: SPEECH THERAPY | Facility: HOSPITAL | Age: 73
Setting detail: THERAPIES SERIES
Discharge: HOME | End: 2018-01-10

## 2018-01-10 ENCOUNTER — HOSPITAL ENCOUNTER (OUTPATIENT)
Dept: PHYSICAL THERAPY | Facility: HOSPITAL | Age: 73
Setting detail: THERAPIES SERIES
Discharge: HOME | End: 2018-01-10

## 2018-01-10 ENCOUNTER — TRANSCRIBE ORDERS (OUTPATIENT)
Dept: PHYSICAL THERAPY | Facility: HOSPITAL | Age: 73
End: 2018-01-10

## 2018-01-10 ENCOUNTER — HOSPITAL ENCOUNTER (OUTPATIENT)
Dept: OCCUPATIONAL THERAPY | Facility: HOSPITAL | Age: 73
Setting detail: THERAPIES SERIES
Discharge: HOME | End: 2018-01-10

## 2018-01-10 DIAGNOSIS — IMO0002 WEAKNESS DUE TO CEREBROVASCULAR ACCIDENT (CVA): Primary | ICD-10-CM

## 2018-01-10 DIAGNOSIS — I63.9 CEREBROVASCULAR ACCIDENT (CVA), UNSPECIFIED MECHANISM (HCC): Primary | ICD-10-CM

## 2018-01-10 DIAGNOSIS — Z86.73 HISTORY OF STROKE: Chronic | ICD-10-CM

## 2018-01-10 DIAGNOSIS — G81.91 RIGHT HEMIPARESIS (HCC): Primary | ICD-10-CM

## 2018-01-10 DIAGNOSIS — G81.91 RIGHT HEMIPARESIS (HCC): ICD-10-CM

## 2018-01-10 DIAGNOSIS — R26.81 UNSTEADY GAIT: Primary | ICD-10-CM

## 2018-01-10 PROCEDURE — G8985 CARRY GOAL STATUS: HCPCS | Performed by: OCCUPATIONAL THERAPIST

## 2018-01-10 PROCEDURE — G8978 MOBILITY CURRENT STATUS: HCPCS | Performed by: PHYSICAL THERAPIST

## 2018-01-10 PROCEDURE — G9162 LANG EXPRESS CURRENT STATUS: HCPCS | Performed by: SPEECH-LANGUAGE PATHOLOGIST

## 2018-01-10 PROCEDURE — 97163 PT EVAL HIGH COMPLEX 45 MIN: CPT | Performed by: PHYSICAL THERAPIST

## 2018-01-10 PROCEDURE — G9159 LANG COMP CURRENT STATUS: HCPCS | Performed by: SPEECH-LANGUAGE PATHOLOGIST

## 2018-01-10 PROCEDURE — G9163 LANG EXPRESS GOAL STATUS: HCPCS | Performed by: SPEECH-LANGUAGE PATHOLOGIST

## 2018-01-10 PROCEDURE — G8984 CARRY CURRENT STATUS: HCPCS | Performed by: OCCUPATIONAL THERAPIST

## 2018-01-10 PROCEDURE — 92523 SPEECH SOUND LANG COMPREHEN: CPT | Performed by: SPEECH-LANGUAGE PATHOLOGIST

## 2018-01-10 PROCEDURE — G8979 MOBILITY GOAL STATUS: HCPCS | Performed by: PHYSICAL THERAPIST

## 2018-01-10 PROCEDURE — G9160 LANG COMP GOAL STATUS: HCPCS | Performed by: SPEECH-LANGUAGE PATHOLOGIST

## 2018-01-10 PROCEDURE — 97167 OT EVAL HIGH COMPLEX 60 MIN: CPT | Performed by: OCCUPATIONAL THERAPIST

## 2018-01-10 NOTE — THERAPY EVALUATION
Outpatient Occupational Therapy Neuro Initial Evaluation  MARIANO Modi     Patient Name: Sunshine Her  : 1945  MRN: [id number]  Today's Date: 1/10/2018      Visit Date: 01/10/2018    Patient Active Problem List   Diagnosis   • Generalized weakness   • Acute on chronic renal insufficiency   • Anemia   • Symptomatic bradycardia   • Leukopenia   • Hypothyroid   • Bipolar affective disorder   • Kidney function abnormal   • COPD (chronic obstructive pulmonary disease)   • CVA (cerebral vascular accident)-Right Occipital/Temporal   • Chronic kidney disease (CKD), stage III (moderate)   • Dementia   • Vertigo   • History of stroke   • Iron deficiency anemia   • CKD (chronic kidney disease), stage III   • Vascular dementia   • Hypertension   • Hyperlipidemia   • Tobacco abuse   • Malignant hypertension   • Orthostasis   • Impaired functional mobility, balance, gait, and endurance   • Sepsis   • Paroxysmal atrial fibrillation   • Dyslipidemia        Past Medical History:   Diagnosis Date   • A-fib    • Acute on chronic renal insufficiency 7/15/2016   • Anemia    • Anxiety    • Arthritis    • Arthritis    • Bipolar 1 disorder    • Bradycardia    • COPD (chronic obstructive pulmonary disease)    • Coronary artery disease    • Dementia    • Disease of thyroid gland    • Generalized weakness 2016   • GERD (gastroesophageal reflux disease)    • Heart disease    • History of transfusion    • Hyperlipidemia    • Hypertension    • PAD (peripheral artery disease)    • Stroke     2016, 2017, 2017   • TIA (transient ischemic attack)    • Vertigo         Past Surgical History:   Procedure Laterality Date   • ANGIOPLASTY SUBCLAVIAN ARTERY     • CAROTID ENDARTERECTOMY Bilateral    • COLONOSCOPY     • COLONOSCOPY N/A 2017    Procedure: COLONOSCOPY  CPTCODE:40536;  Surgeon: Suraj Quintanilla III, MD;  Location: [facility];  Service:    • ENDOSCOPY     • ENDOSCOPY N/A 2017    Procedure:  "ESOPHAGOGASTRODUODENOSCOPY WITH BIOPSY  CPTCODE:27469;  Surgeon: Suraj Quintanilla III, MD;  Location: [facility] OR;  Service:    • ENDOSCOPY W/ PEG TUBE PLACEMENT N/A 6/28/2017    Procedure: ESOPHAGOGASTRODUODENOSCOPY WITH PERCUTANEOUS ENDOSCOPIC GASTROSTOMY TUBE INSERTION;  Surgeon: Renan Montanez MD;  Location: [facility] OR;  Service:    • EYE SURGERY      bilat cataracts   • RENAL ARTERY BYPASS           Visit Dx:      ICD-10-CM ICD-9-CM   1. Right hemiparesis G81.91 342.90             Patient History       01/10/18 1500 01/10/18 1300       History    Chief Complaint Difficulty with daily activities;Muscle weakness  - Balance Problems;Muscle weakness;Difficulty Walking  -     Date Current Problem(s) Began 06/12/17  - 06/12/17  -AD     Brief Description of Current Complaint pt daughter present for evaluation.  Patient diagnosed with CVA this past June with right sided weakness.  Patients daughter reports that patient has had multiple CVA.  she spent some time at [facility] for therapy.  She was referred to outpatient therapy at this time.  - Pt's daughter was present during the evaluation, helping as historian.   -AD     Patient/Caregiver Goals  Relieve pain;Return to prior level of function;Improve mobility;Improve strength  -AD     Current Tobacco Use  None  -AD     Patient's Rating of General Health Fair  - Fair  -AD     Hand Dominance right-handed  - right-handed  -AD     Results of Clinical Tests  CVA June 2017  -AD     Pain     Pain Comments  Denies pain  -AD     Difficulties with ADL's? mod to max assist  -      Fall Risk Assessment    Any falls in the past year: Yes  - Yes  -AD     Factors that contributed to the fall:  --   \"Fell during stroke\"  -AD     Services    Prior Rehab/Home Health Experiences Yes  - Yes   No rehab since most recent CVA on 6/12/17.  -AD     When was the prior experience with Rehab/Home Health following first CVA 2016  - 2016 following first " stroke  -AD     Where was the prior experience with Rehab/Home Health [facility]  -[facility]  -AD     Daily Activities    Primary Language English  - English  -AD     Are you able to read Yes  - Yes  -AD     Are you able to write Yes  - Yes  -AD     Pt Participated in POC and Goals Yes  -      Safety    Are you being hurt, hit, or frightened by anyone at home or in your life? No  - No  -AD     Are you being neglected by a caregiver No  - No  -AD       User Key  (r) = Recorded By, (t) = Taken By, (c) = Cosigned By    Initials Name Provider Type    AD Ashley Claudene Dalton, PT Physical Therapist     Dana Ludwig, OT Occupational Therapist                OT Neuro       01/10/18 1500          Home Living    Living Arrangements house  -      Home Accessibility no concerns  -      Living Environment Comment lives with daughter who assists with care  -      Vision- Basic    Patient Visual Report --   no problems noted  -      Cognitive Assessment/Intervention    Current Cognitive/Communication Assessment --   expressive aphasia noted  -      Orientation Status oriented to;person;place  -      Follows Commands/Answers Questions --   with min cues at times  -      Sensation    Sensation WNL? WFL  -      Coordination    Coordination Tests --   unable  -      ROM (Range of Motion)    General ROM upper extremity range of motion deficits identified  -      General ROM Detail --   shoulder 1/2, elbow1/2 forearm 1/2 range  -      MMT (Manual Muscle Testing)    General MMT Assessment upper extremity strength deficits identified  -      General MMT Assessment Detail 2-/5 throughout  -      Tone    UE Tone Hypertonic  -      ADL Assessment/Intervention    ADL's Assessed? Upper Body Bathing;Lower Body Bathing;Upper Body Dressing;Lower Body Dressing;Toileting;Grooming  -      Additional Documentation --   daughter reports she completes majority of self care for pt   -        User Key  (r) = Recorded By, (t) = Taken By, (c) = Cosigned By    Initials Name Provider Type     Dana Ludwig OT Occupational Therapist             Hand Therapy (last 24 hours)      Hand Eval       01/10/18 1529          Hand ROM Tested?    Hand ROM Tested? --   grasp and release 1/3 range  -       Strength Right    Right  Test 1 2  -AH      Right  Test 2 2  -AH      Right  Test 3 2  -AH       Strength Average Right 2  -AH       Strength Left    Left  Test 1 50  -AH      Left  Test 2 50  -AH      Left  Test 3 50  -AH       Strength Average Left 50  -AH        User Key  (r) = Recorded By, (t) = Taken By, (c) = Cosigned By    Initials Name Provider Type     Dana Ludwig OT Occupational Therapist                         OT Goals       01/10/18 1500       OT Short Term Goals    STG Date to Achieve 02/10/18  -     STG 1 patient will demonstrate increased right shoulder AROM by 10-15 degrees.  -     STG 2 patient will demonstrate increased grasp and release to transfer bean bags with right hand.  -     STG 3 patient will demonstrate increased functional endurance to tolerated 30 min + of functional activity.  -     STG 4 patient will demonstrate increased right hand  strength 2-3 lbs.  -     Long Term Goals    LTG Date to Achieve 03/10/18  -     LTG 1 patient and family independent with HEP  -     LTG 2 patient will demonstrate increased ROM right UE to increase functional use as assist.  -     LTG 3 patient will demonstrate increased strength throughout right Ue to increase functional use.  -     LTG 4 patient will increase overall functional endurance to complete self care with increased independence.  -     LTG 5 patient will be assessed and independent with AE andADL as appropriate.  -       User Key  (r) = Recorded By, (t) = Taken By, (c) = Cosigned By    Initials Name Provider Type    ZULEYMA Ludwig OT  Occupational Therapist                OT Assessment/Plan       01/10/18 1530 01/10/18 1516    OT Assessment    Impairments Coordination;Endurance;Muscle strength;Range of motion  -     Assessment Comments patient presents with decreased right UE strength, coordination, ROM , endurance and independence with ADL  -     OT Rehab Potential Good  -     Patient/caregiver participated in establishment of treatment plan and goals Yes  -     Patient would benefit from skilled therapy intervention Yes  -     OT Plan    OT Frequency 2x/week  -     Predicted Duration of Therapy Intervention (days/wks) 4 weeks  - 4 weeks  -    Planned CPT's? OT EVAL HIGH COMPLEXITY: 44509  -     Planned Therapy Interventions (Optional Details) home exercise program;motor coordination training;neuromuscular re-education;patient/family education;ROM (Range of Motion);strengthening;stretching  -     OT Plan Comments OT as planned  -       User Key  (r) = Recorded By, (t) = Taken By, (c) = Cosigned By    Initials Name Provider Type    AD Ashley Claudene Dalton, PT Physical Therapist     Dana Ludwig OT Occupational Therapist                            Time Calculation:   OT Start Time: 1445  OT Stop Time: 1530  OT Time Calculation (min): 45 min     Therapy Charges for Today     Code Description Service Date Service Provider Modifiers Qty    97419274642 HC OT CARRY MOV HAND OBJ CURRENT 1/10/2018 Dana Ludwig OT GO, CM 1    82281567038 HC OT CARRY MOV HAND OBJ PROJECTED 1/10/2018 Dana Ludwig OT GO, CL 1    [id number] HC OT EVAL HIGH COMPLEXITY 3 1/10/2018 Dana Ludwig OT GO 1          OT G-codes  OT Professional Judgement Used?: Yes  Functional Limitation: Carrying, moving and handling objects  Carrying, Moving and Handling Objects Current Status (): At least 80 percent but less than 100 percent impaired, limited or restricted  Carrying, Moving and Handling Objects Goal Status ():  At least 60 percent but less than 80 percent impaired, limited or restricted          Dana Ludwig, OT  1/10/2018

## 2018-01-10 NOTE — THERAPY EVALUATION
"Outpatient Speech Language Pathology   Adult Speech Language Cognitive Initial Evaluation  [facility]     Patient Name: Sunshine Her  : 1945  MRN: [id number]  Today's Date: 1/10/2018    Sunshine Her  is seen this pm at [facility] [place] outpatient rehabilitation to participate in a formal s/l and cognitive evaluation to assess safety/function in adls. Pt is positioned upright and centered in wheelchair to cooperatively participate. Pt is accompanied to evaluation by her daughter who serves as primary informant for case history.  All results and observations of this evaluation are felt to be representative of pt's current functional status.    Social History     Social History   • Marital status:      Spouse name: N/A   • Number of children: N/A   • Years of education: N/A     Occupational History   • Not on file.     Social History Main Topics   • Smoking status: Former Smoker     Packs/day: 1.00     Years: 40.00     Types: Cigarettes     Quit date: 2017   • Smokeless tobacco: Never Used   • Alcohol use No      Comment: \"used to drink a lot\" none now  (per daughter)   • Drug use: No   • Sexual activity: Defer     Other Topics Concern   • Not on file     Social History Narrative    Currently lives in [facility] with daughter.         Diet Orders (active)     None        Ms. Her is observed on ra w/o complications, tolerating well.       Receptive language skills are severely impaired. Pt is able to id common objects  follow simple directives given mod-max cues and extended time. Pt has severe difficulty w/ following multi-step directives, understand complex \"wh\" questions and participate in conversational exchange w/o difficulties.    Expressive language skills are severely impaired. Pt is able to complete automatic speech tasks and confrontation naming tasks in gross approximation w/ max cues from SLP. Pt requires extended time to complete all tasks. Pt is able to verbalize initial phoneme of tasks. Pt has " "severe difficulty w/  complete complex oe sentences, respond to complet oe \"wh\" questions, repeat at word/sentence and multiple digit levels, as well as participate in complex conversational exchange. Pt is noted w/ severe receptive/expressive aphasia.    Unable to assess Immediate, STM and LTM are w/ pt recalling recent adls and providing personal information w/o delays. Thought organization, processing and problem solving are severely impaired/unable to assess 2/2 level of severity of aphasia w/ pt unable to provide appropriate comparing/contrasting, understanding of idiomatic language, convergent and divergent thinking skills. Difficult to assess 2/2 to pt's level of severity of aphasia. Will continue to assess during tx session.     Facial/oral structures are symmetrical upon observation. Oral mucosa are moist, pink and clean. Secretions are clear, thin and well controlled. OROM/ROSAURA is wfl w/o lingual deviation upon protrusion. Unable to assess speech clarity 2/2 level of severity of aphasia.     Graphic and reading skills are severely impaired/difficult to assess 2/2 level of severity of aphasia. Pt is able to id simple words. Pt has severe diffcultlty w/ moderate words, as well as sentences and small paragraphs. Signature is illegible.Pt is right handed and has R sided weakness.     Pragmatic skills are WFL w/ appropriate eye gaze patterns and visual tracking.  No left neglect evidenced. Humor response is intact w/ appropriate affect.    Impression: Pt presents w/ severe receptive/expressive aphasia.       D/w pt and pt's daughter results and recommendations w/ verbal understanding and agreement.      Thank you for allowing me to participate in the care of your patient-  Sharita Day M.S., CFY-SLP    Visit Date: 01/10/2018   Patient Active Problem List   Diagnosis   • Generalized weakness   • Acute on chronic renal insufficiency   • Anemia   • Symptomatic bradycardia   • Leukopenia   • Hypothyroid   • " Bipolar affective disorder   • Kidney function abnormal   • COPD (chronic obstructive pulmonary disease)   • CVA (cerebral vascular accident)-Right Occipital/Temporal   • Chronic kidney disease (CKD), stage III (moderate)   • Dementia   • Vertigo   • History of stroke   • Iron deficiency anemia   • CKD (chronic kidney disease), stage III   • Vascular dementia   • Hypertension   • Hyperlipidemia   • Tobacco abuse   • Malignant hypertension   • Orthostasis   • Impaired functional mobility, balance, gait, and endurance   • Sepsis   • Paroxysmal atrial fibrillation   • Dyslipidemia        Past Medical History:   Diagnosis Date   • A-fib    • Acute on chronic renal insufficiency 7/15/2016   • Anemia    • Anxiety    • Arthritis    • Arthritis    • Bipolar 1 disorder    • Bradycardia    • COPD (chronic obstructive pulmonary disease)    • Coronary artery disease    • Dementia    • Disease of thyroid gland    • Generalized weakness 7/14/2016   • GERD (gastroesophageal reflux disease)    • Heart disease    • History of transfusion    • Hyperlipidemia    • Hypertension    • PAD (peripheral artery disease)    • Stroke     8/2016, 2/2017, 6/2017   • TIA (transient ischemic attack)    • Vertigo         Past Surgical History:   Procedure Laterality Date   • ANGIOPLASTY SUBCLAVIAN ARTERY     • CAROTID ENDARTERECTOMY Bilateral    • COLONOSCOPY  2012   • COLONOSCOPY N/A 2/24/2017    Procedure: COLONOSCOPY  CPTCODE:50309;  Surgeon: Suraj Quintanilla III, MD;  Location: [facility];  Service:    • ENDOSCOPY     • ENDOSCOPY N/A 2/24/2017    Procedure: ESOPHAGOGASTRODUODENOSCOPY WITH BIOPSY  CPTCODE:74059;  Surgeon: Suraj Quintanilla III, MD;  Location: [facility] OR;  Service:    • ENDOSCOPY W/ PEG TUBE PLACEMENT N/A 6/28/2017    Procedure: ESOPHAGOGASTRODUODENOSCOPY WITH PERCUTANEOUS ENDOSCOPIC GASTROSTOMY TUBE INSERTION;  Surgeon: Renan Montanez MD;  Location: [facility] OR;  Service:    • EYE SURGERY      bilat cataracts   • RENAL  "ARTERY BYPASS           Visit Dx:    ICD-10-CM ICD-9-CM   1. Cerebrovascular accident (CVA), unspecified mechanism I63.9 434.91             Patient History       01/10/18 1500 01/10/18 1300       History    Chief Complaint Difficulty with daily activities;Muscle weakness  - Balance Problems;Muscle weakness;Difficulty Walking  -     Date Current Problem(s) Began 06/12/17  - 06/12/17  -AD     Brief Description of Current Complaint pt daughter present for evaluation.  Patient diagnosed with CVA this past June with right sided weakness.  Patients daughter reports that patient has had multiple CVA.  she spent some time at [facility] for therapy.  She was referred to outpatient therapy at this time.  - Pt's daughter was present during the evaluation, helping as historian.   -AD     Patient/Caregiver Goals  Relieve pain;Return to prior level of function;Improve mobility;Improve strength  -AD     Current Tobacco Use  None  -AD     Patient's Rating of General Health Fair  - Fair  -AD     Hand Dominance right-handed  - right-handed  -AD     Results of Clinical Tests  CVA June 2017  -AD     Pain     Pain Comments  Denies pain  -AD     Difficulties with ADL's? mod to max assist  -      Fall Risk Assessment    Any falls in the past year: Yes  -AH Yes  -AD     Factors that contributed to the fall:  --   \"Fell during stroke\"  -AD     Services    Prior Rehab/Home Health Experiences Yes  - Yes   No rehab since most recent CVA on 6/12/17.  -AD     When was the prior experience with Rehab/Home Health following first CVA 2016  - 2016 following first stroke  -AD     Where was the prior experience with Rehab/Home Health [facility]  -[facility]  -     Daily Activities    Primary Language English  - English  -AD     Are you able to read Yes  - Yes  -AD     Are you able to write Yes  - Yes  -AD     Pt Participated in POC and Goals Yes  -      Safety    Are you being hurt, hit, or " frightened by anyone at home or in your life? No  -AH No  -AD     Are you being neglected by a caregiver No  - No  -AD       User Key  (r) = Recorded By, (t) = Taken By, (c) = Cosigned By    Initials Name Provider Type    AD Ashley Claudene Dalton, PT Physical Therapist     Dana Ludwig, OT Occupational Therapist                Adult Speech Language - 01/10/18 1800     Background and History    Reason for Referral Pt had CVA in June 2017. Pt referred as daughter reports pt has regressed since d/c from SNF  -CJ    Stated Goals Per daughter, to improve her overall functional communication to be able to get around better at home.   -CJ    Description of Complaint Pt currently is unable to communicate wants and needs to independently complete adls.   -CJ    Previous Functional Status Memory was Impaired;Speech was fluent;Functional for Activities of Daily Living with assistance;Voice was normal in all environments;Attention was functional  -CJ    Current Baseline Abilities Pt currently communicates primarily through gestures, yes/no head nod, and can say a few words per daughter.   -CJ    Pertinent Medications Please see chart review for full medication summary  -CJ    Primary Language in the Home english  -CJ    Primary Caregiver Daughter  -CJ    Informant for the Evaluation Other (comment)   Daughter, pt lives w/  -CJ    Comprehension    Recognition common objects;pictures;to be assessed in therapy  -CJ    Common Objects Moderate  -CJ    Pictures Mild moderate  -CJ    Answer Questions general info yes/no;complex yes/no questions;abstract yes/no;questions about a paragraph;questions about multiple paragraphs  -CJ    Abstract Yes/No Questions Unable to assess  -CJ    Questions About a Paragraph Unable to assess  -CJ    Questions About Multiple Paragraphs Unable to assess  -CJ    Commands multistep basic commands;complex/abstract commands  -CJ    Multistep Basic Commands Severe;Moderate severe  -CJ     Complex/Abstract Commands CHRISTOPHER: Unable to assess  -CJ    Conversation simple conversation;complex conversation  -CJ    Simple Conversation Severe  -CJ    Complex Conversation Profound  -CJ    Reading Status unable to assess  -CJ    Expression    Primary Mode of Expression nonverbal - pointing;nonverbal - gestures;verbal   1 word responses  -CJ    Dominant Hand Right  -CJ    Expressive Language --   Severe  -CJ    Receptive Language --   Severe  -CJ    Written Expression copy shapes;legibility;copy letters;copy words;copy sentences;single word formulation;phrase formulation;applied functional writing tasks  -CJ    Legibility severe  -CJ    Copy Shapes severe  -CJ    Copy Letters severe  -CJ    Copy Words severe  -CJ    Copy Sentences severe  -CJ    Single Word Formulation severe  -CJ    Phrase Formulation severe  -CJ    Applied Functional Writing Tasks severe  -CJ    Cognitive Communication and Memory    Attention selective attention;alternating attention;divided attention  -CJ    Sustained Attention Moderate severe  -CJ    Alternating Attention Moderate severe  -CJ    Orientation unable to assess  -CJ    Executive Function unable to assess  -CJ    Calculations unable to assess  -CJ    Oral Motor    Facial Appearance WFL  -CJ    Dentition adequate  -CJ    AMR's and SMR's    Alternate Motion Rates could not test  -CJ    Oral Motor Planning    Imitating Isolated Oral Movements accurate  -CJ    Producing Isolated Oral Movement on Command accurate, immediate, on command  -CJ    Imitating Sequental Oral Movements accurate  -CJ    Comprehensibility of Message    Message is Usually Comprehensible To: no one  -CJ    Uses Strategies to Improve Comprehensibility not at all  -CJ      User Key  (r) = Recorded By, (t) = Taken By, (c) = Cosigned By    Initials Name Provider Type    CJ Sharita Day Speech Therapist      PLAN OF CARE:    Long term goals:  1. Patient will improve expressive language skills to allow for maximal  functional communication and independence in adls.          2. Patient will improve receptive language skills to allow for maximal functional communication and independence in adls.        Short term goals:  1. Patient will produce rote tasks w/ max cues at 90% across 3 consecutive sessions.   2.  Patient will produce personal identifiers w/ max cues/models 3/5 opp over 3 consecutive sessions.   3.  Patientwill imitate confrontation naming of simple, common objects w/ max cues and model 80% in gross approximation across 3 consecutive sessions.   4.  Patient will match word to object 80% across 3 consecutive sessions w/ max cues.   5.  Patient will respond to moderatey/n questions at 80% w/ max cues across 3 consecutive sessions.   6.  Patient will follow simple functional directives in adls 2-3 steps, w/max cues across 3 consecutive sessions.   7.  Patient will copy/trace her name common words at 80% w/ max cues across 3 consecutive sessions.   8. Patient will repeat at single syllable level in gross approximation w/ max cues w/ 50% acc across 3 consecutive sessions      *Further goals may be added pending progress towards this POC    Thank you-  Sharita Day M.S., CFY-SLP              OP SLP Education       01/10/18 1800    Education    Barriers to Learning Decreased comprehension  -CJ    Action Taken to Address Barriers D/w pt's daughter evaluation results as pt is noted w/ severe aphasia.   -CJ    Education Provided Described results of evaluation;Family/caregivers expressed understanding of evaluation;Patient requires further education on strategies, risks;Family/caregivers require further education on strategies, risks  -CJ    Assessed Learning needs;Learning motivation;Learning preferences;Learning readiness  -CJ    Learning Motivation Strong  -CJ    Learning Method Explanation;Demonstration  -CJ    Teaching Response Verbalized understanding  -CJ    Education Comments D/w pt and pt's daughter evaluation  results w/ goals to be addressed in POC. They both verbalize agreement and understanding.   -CJ      User Key  (r) = Recorded By, (t) = Taken By, (c) = Cosigned By    Initials Name Effective Dates    NELLY Day 05/15/17 -                 SLP OP Goals       01/10/18 1812 01/10/18 1500    SLP Time Calculation    SLP Goal Re-Cert Due Date 02/10/18  -CJ     Time Calculation    PT Goal Re-Cert Due Date  02/09/18  -AD      User Key  (r) = Recorded By, (t) = Taken By, (c) = Cosigned By    Initials Name Provider Type    AD Ashley Claudene Dalton, PT Physical Therapist    NELLY Day Speech Therapist                OP SLP Assessment/Plan - 01/10/18 1800     SLP Assessment    Functional Problems Speech Language- Adult/Cognition  -    Impact on Function: Adult Speech Language/Cognition Difficulty communicating wants, needs, and ideas;Difficulty communicating in a medical emergency;Restrictions in personal and social life;Difficulty sequencing thoughts to express complex messages;Unable to understand written/spoken language;Difficulty following directions;Difficulty sequencing or problem solving to complete ADLs;Difficulty participating in avocational activities;Unable to complete specified job requirements;Decreased recall of personal information and medical history;Trouble learning or remembering new information;Requires supervision  -    Clinical Impression: Speech Language-Adult/Congnition Severe:;Receptive Aphasia;Expressive Aphasia  -    Functional Problems Comment Pt is a 71 y/o female being evaluated at [facility] outpatient rehabilitation for s/l/cognitive. Pt's daughter serves as informant for this evaluation. Pt had CVA in June 2016 and spend 3 weeks in SNF and was d/c home. Pt's daughter reports regress in progress since d/c from SNF. Pt is evaluated using the speech language cognitive stimuli. Pt currently communicates primarily through gestures, yes/no head nod, and less than 10 word vocabulary. Pt  is only able to verbalize a few words inconsistently. Pt has difficulty following multistep commands, using words to communicate, sustaining attention. Based on chart review, clinical observation, evaluation results, and patient daughter report, pt presents w/ a severe receptive/expressive aphasia. Unable to formally assess memory, cognition, and executive function 2/2 severity of aphasia.  Pt is unable to functionally/independently complete all adls, communicate w/ others, and function in daily life w/o assistance. Pt is felt to most benefit from formal ST services to address severe aphasia 3x week.   -CJ    Clinical Impression Comments Based on clinical analysis, pt presents w/ a severe receptive-expressive aphasia as evidenced by clinical observation, evaluation results, pt's daughter report, and chart review. Pt is unable to functionally/independently communicate wants and needs to complete adls. Pt is felt to most benefit from formal ST services to address severe deficits 3x week.   -CJ    Please refer to paper survey for additional self-reported information Yes  -CJ    Please refer to items scanned into chart for additional diagnostic informaiton and handouts as provided by clinician Yes  -CJ    Prognosis Fair (comment)  -CJ    Patient/caregiver participated in establishment of treatment plan and goals Yes  -CJ    Patient would benefit from skilled therapy intervention Yes  -CJ    SLP Plan    Frequency 3x week  -CJ    Duration 12 weeks  -CJ    Expected Duration Therapy Session (min) 30-45 minutes;45-60 minutes  -CJ    Plan Comments Complete Evaluation, Initiate POC  -CJ      User Key  (r) = Recorded By, (t) = Taken By, (c) = Cosigned By    Initials Name Provider Type    NELLY Day Speech Therapist                 Time Calculation:   SLP Start Time: 1330  SLP Stop Time: 1400  SLP Time Calculation (min): 30 min  SLP Non-Billable Time (min): 45 min    Therapy Charges for Today     Code Description Service  Date Service Provider Modifiers Qty    58026742078 HC ST SPOKEN LANG EXPRESS CURRENT 1/10/2018 Sharitaadilia Day GN, CM 1    88358490060 HC ST SPOKEN LANG EXPRESS PROJECTED 1/10/2018 Sharitaadilia Day GN, CK 1    [id number] HC ST SPOKEN LANG COMP CURRENT 1/10/2018 Sharita BENJIE Day GN, CL 1    89376657273 HC ST SPOKEN LANG COMP PROJECTED 1/10/2018 Sharita BENJIE Day GN, CK 1    68293593388 HC ST CARE PLAN 15 MIN 1/10/2018 Sharita Day GN 3    [id number] HC ST EVAL SPEECH AND PROD W LANG  2 1/10/2018 Sharita Day 58, GN 1          SLP G-Codes  SLP NOMS Used?: Yes  Functional Limitations: Spoken language comprehension  Spoken Language Comprehension Current Status (): At least 60 percent but less than 80 percent impaired, limited or restricted  Spoken Language Comprehension Goal Status (): At least 40 percent but less than 60 percent impaired, limited or restricted  Spoken Language Expression Current Status (): At least 80 percent but less than 100 percent impaired, limited or restricted  Spoken Language Expression Goal Status (): At least 40 percent but less than 60 percent impaired, limited or restricted        Sharita Day  1/10/2018

## 2018-01-10 NOTE — THERAPY EVALUATION
.Outpatient Physical Therapy Neuro Initial Evaluation  MARIANO Modi     Patient Name: Sunshine Her  : 1945  MRN: [id number]  Today's Date: 1/10/2018      Visit Date: 01/10/2018    Patient Active Problem List   Diagnosis   • Generalized weakness   • Acute on chronic renal insufficiency   • Anemia   • Symptomatic bradycardia   • Leukopenia   • Hypothyroid   • Bipolar affective disorder   • Kidney function abnormal   • COPD (chronic obstructive pulmonary disease)   • CVA (cerebral vascular accident)-Right Occipital/Temporal   • Chronic kidney disease (CKD), stage III (moderate)   • Dementia   • Vertigo   • History of stroke   • Iron deficiency anemia   • CKD (chronic kidney disease), stage III   • Vascular dementia   • Hypertension   • Hyperlipidemia   • Tobacco abuse   • Malignant hypertension   • Orthostasis   • Impaired functional mobility, balance, gait, and endurance   • Sepsis   • Paroxysmal atrial fibrillation   • Dyslipidemia        Past Medical History:   Diagnosis Date   • A-fib    • Acute on chronic renal insufficiency 7/15/2016   • Anemia    • Anxiety    • Arthritis    • Arthritis    • Bipolar 1 disorder    • Bradycardia    • COPD (chronic obstructive pulmonary disease)    • Coronary artery disease    • Dementia    • Disease of thyroid gland    • Generalized weakness 2016   • GERD (gastroesophageal reflux disease)    • Heart disease    • History of transfusion    • Hyperlipidemia    • Hypertension    • PAD (peripheral artery disease)    • Stroke     2016, 2017, 2017   • TIA (transient ischemic attack)    • Vertigo         Past Surgical History:   Procedure Laterality Date   • ANGIOPLASTY SUBCLAVIAN ARTERY     • CAROTID ENDARTERECTOMY Bilateral    • COLONOSCOPY     • COLONOSCOPY N/A 2017    Procedure: COLONOSCOPY  CPTCODE:42557;  Surgeon: Suraj Quintanilla III, MD;  Location: [facility];  Service:    • ENDOSCOPY     • ENDOSCOPY N/A 2017    Procedure:  "ESOPHAGOGASTRODUODENOSCOPY WITH BIOPSY  CPTCODE:84684;  Surgeon: Suraj Quintanilla III, MD;  Location: [facility] OR;  Service:    • ENDOSCOPY W/ PEG TUBE PLACEMENT N/A 6/28/2017    Procedure: ESOPHAGOGASTRODUODENOSCOPY WITH PERCUTANEOUS ENDOSCOPIC GASTROSTOMY TUBE INSERTION;  Surgeon: Renan Montanez MD;  Location: [facility] OR;  Service:    • EYE SURGERY      bilat cataracts   • RENAL ARTERY BYPASS           Visit Dx:     ICD-10-CM ICD-9-CM   1. Unsteady gait R26.81 781.2   2. Right hemiparesis G81.91 342.90             Patient History       01/10/18 1500 01/10/18 1300       History    Chief Complaint Difficulty with daily activities;Muscle weakness  - Balance Problems;Muscle weakness;Difficulty Walking  -     Date Current Problem(s) Began 06/12/17  - 06/12/17  -AD     Brief Description of Current Complaint pt daughter present for evaluation.  Patient diagnosed with CVA this past June with right sided weakness.  Patients daughter reports that patient has had multiple CVA.  she spent some time at [facility] for therapy.  She was referred to outpatient therapy at this time.  - Pt's daughter was present during the evaluation, helping as historian.   -AD     Patient/Caregiver Goals  Relieve pain;Return to prior level of function;Improve mobility;Improve strength  -AD     Current Tobacco Use  None  -AD     Patient's Rating of General Health Fair  -AH Fair  -AD     Hand Dominance right-handed  - right-handed  -AD     Results of Clinical Tests  CVA June 2017  -AD     Pain     Pain Comments  Denies pain  -AD     Difficulties with ADL's? mod to max assist  -      Fall Risk Assessment    Any falls in the past year: Yes  -AH Yes  -AD     Factors that contributed to the fall:  --   \"Fell during stroke\"  -AD     Services    Prior Rehab/Home Health Experiences Yes  - Yes   No rehab since most recent CVA on 6/12/17.  -AD     When was the prior experience with Rehab/Home Health following first CVA " 2016  - 2016 following first stroke  -AD     Where was the prior experience with Rehab/Home Health [facility]  -[facility]  -AD     Daily Activities    Primary Language English  - English  -AD     Are you able to read Yes  - Yes  -AD     Are you able to write Yes  - Yes  -AD     Pt Participated in POC and Goals Yes  -      Safety    Are you being hurt, hit, or frightened by anyone at home or in your life? No  - No  -AD     Are you being neglected by a caregiver No  - No  -AD       User Key  (r) = Recorded By, (t) = Taken By, (c) = Cosigned By    Initials Name Provider Type    AD Ashley Claudene Dalton, PT Physical Therapist     Dana Ludwig, OT Occupational Therapist                    PT Neuro       01/10/18 1400          Sensation    Sensation WNL? WFL  -AD      DTR- Lower Quarter Clearing    Patellar tendon (L2-4) Left:;3- Slightly hyperactive response  -AD      Achilles tendon (S1-2) Bilateral:;2- Normal response  -AD      Coordination    Finger to Nose Eyes Open Intact  -AD      Finger to Nose Eyes Closed Impaired  -AD      Lower Extremity    Lower Ext Manual Muscle Testing --   LLE 4/5, RLE grossly 3+/5  -AD      Tone    LE Tone WFL  -AD      Bed Mobility, Assessment/Treatment    Bed Mobility, Roll Left, [place] minimum assist (75% patient effort)  -AD      Bed Mobility, Roll Right, [place] minimum assist (75% patient effort)  -AD      Bed Mobility, Scoot/Bridge, [place] minimum assist (75% patient effort)  -AD      Bed Mob, Supine to Sit, [place] minimum assist (75% patient effort)  -AD      Bed Mob, Sit to Supine, [place] minimum assist (75% patient effort)  -AD      Bed Mob, Sidelying to Sit, [place] minimum assist (75% patient effort)  -AD      Bed Mob, Sit to Sidelying, [place] minimum assist (75% patient effort)  -AD      Bed Mobility, Safety Issues cognitive deficits limit understanding;decreased use of arms for  pushing/pulling;decreased use of legs for bridging/pushing  -AD      Bed Mobility, Impairments ROM decreased;strength decreased;impaired balance;coordination impaired  -AD      Transfers    Transfers, Bed-Chair [place] contact guard assist;minimum assist (75% patient effort)  -AD      Transfers, Chair-Bed [place] contact guard assist;minimum assist (75% patient effort)  -AD      Transfers, Bed-Chair-Bed, Assist Device quad cane  -AD      Transfers, Sit-Stand [place] contact guard assist  -AD      Transfers, Stand-Sit [place] moderate assist (50% patient effort)  -AD      Transfers, Sit-Stand-Sit, Assist Device quad cane  -AD      Gait Assessment/Treatment    Gait, Comment Unsteady gait, decreased right stance phase, flexed forward posture  -AD      Balance Skills Training    Standing-Level of Assistance Contact guard;Minimum assistance  -AD      Gait Balance-Level of Assistance Contact guard;Minimum assistance  -AD        User Key  (r) = Recorded By, (t) = Taken By, (c) = Cosigned By    Initials Name Provider Type    AD Ashley Claudene Dalton, PT Physical Therapist                  Therapy Education  Given: HEP, Posture/body mechanics, Fall prevention and home safety, Mobility training  Program: New  How Provided: Verbal, Demonstration  Provided to: Patient, Caregiver (Daughter present)  Level of Understanding: Verbalized, Demonstrated          PT OP Goals       01/10/18 1500       PT Short Term Goals    STG Date to Achieve 01/24/18  -AD     STG 1 Pt and caregiver will be instructed in HEP.  -AD     STG 1 Progress New  -AD     STG 2 Pt and family will be provided education on home safety and fall prevention.  -AD     STG 2 Progress New  -AD     STG 3 Pt will score improve 4 points on the Tinetti for improved mobility.  -AD     STG 3 Progress New  -AD     Long Term Goals    LTG Date to Achieve 02/09/18  -AD     LTG 1 Pt will perform SLS for 5 seconds for improved balance and stability.  -AD      LTG 1 Progress New  -AD     LTG 2 Pt will perform tandem stance for 20 seconds for improved balance and stability.  -AD     LTG 2 Progress New  -AD     LTG 3 Pt will ambulate 100' with equal weight shifting and toe clearance, using appropriate assistive device, for improved mobility and safety.  -AD     LTG 3 Progress New  -AD     LTG 4 Pt will score 15/28 on the Tinetti for improved gait, balance, and safety.  -AD     LTG 4 Progress New  -AD     Time Calculation    PT Goal Re-Cert Due Date 02/09/18  -AD       User Key  (r) = Recorded By, (t) = Taken By, (c) = Cosigned By    Initials Name Provider Type    AD Ashley Claudene Dalton, PT Physical Therapist                PT Assessment/Plan       01/10/18 1516       PT Assessment    Functional Limitations Decreased safety during functional activities;Impaired gait;Limitation in home management;Limitations in functional capacity and performance;Performance in work activities  -AD     Impairments Balance;Coordination;Gait;Endurance;Motor function;Muscle strength;Poor body mechanics;Posture;Range of motion  -AD     Assessment Comments The patient is a 72 year old female presenting to the clinic s/p CVA on 6/12/17. The patient's daughter was present throughout the evaluation and provided patient history. The patient required CGA/min assist with transfers and bed mobility. Gait was assessed in the parallel bars, progressing to a quad cane with CGA. The patient was instructed to use her quad cane at home, instead of using the wall for support as reported by her daughter. The patient would benefit from skilled physical therapy to address functional limitations and impairments. She will be progressed as tolerated.  -AD     Please refer to paper survey for additional self-reported information Yes  -AD     Rehab Potential Good  -AD     Patient/caregiver participated in establishment of treatment plan and goals Yes  -AD     Patient would benefit from skilled therapy intervention  "Yes  -AD     PT Plan    PT Frequency 2x/week  -AD     Predicted Duration of Therapy Intervention (days/wks) 4 weeks  -AD     Planned CPT's? PT EVAL HIGH COMPLEXITY: 73389;PT RE-EVAL: 38575;PT NEUROMUSC RE-EDUCATION EA 15 MIN: 35134;PT MANUAL THERAPY EA 15 MIN: 28319;PT THER ACT EA 15 MIN: 28214;PT THER PROC EA 15 MIN: 30897;PT GAIT TRAINING EA 15 MIN: 54993;PT SELF CARE/HOME MGMT/TRAIN EA 15: 13857;PT ELECTRICAL STIM ATTD EA 15 MIN: 43899;PT HOT OR COLD PACK TREAT MCARE;PT THER SUPP EA 15 MIN;PT THER PROC GROUP: 26840  -AD     PT Plan Comments Progress as tolerated per POC.  -AD       User Key  (r) = Recorded By, (t) = Taken By, (c) = Cosigned By    Initials Name Provider Type    AD Ashley Claudene Dalton, PT Physical Therapist                             Outcome Measure Options: Tinetti  Tinetti Assessment  Sitting Balance: Steady,safe  Arises: Able, uses arms  Attempts to Rise: Able, requires > 1 attempt  Immediate Standing Balance (first 5 sec): Steady, with support  Standing Balance: Steady, stance > 4 inch KINA & requires support  Sternal Nudge (feet close together): Begins to fall  Eyes Closed (feet close together): Unsteady  Turning 360 Degrees- Steps: Discontinuous steps  Turning 360 Degrees- Steadiness: Unsteady (staggers, grabs)  Sitting Down: Uses arms or not a smooth motion  Tinetti Balance Score: 6  Gait Initiation (immediate after told \"go\"): Any hesitancy, multiple attempts to start  Step Length- Right Swing: Right swing foot does not pass Left stance leg  Step Length- Left Swing: Left swing foot does not pass Right  Foot Clearance- Right Foot: Right foot does not completely clear floor  Foot Clearance- Left Foot: Left foot does not completely clear floor  Step Symmetry: Right and Left step length equal  Step Continuity: Steps appear continuous  Path (excursion): Mild/moderate deviation or use of aid  Trunk: Marked sway or uses device  Base of Support: Heels apart  Gait Score: 3  Tinetti Total Score: " 9    Time Calculation:   Start Time: 1400  Stop Time: 1445  Time Calculation (min): 45 min     Therapy Charges for Today     Code Description Service Date Service Provider Modifiers Qty    91423655101 HC PT MOBILITY CURRENT 1/10/2018 Ashley Claudene Dalton, PT GP, CL 1    23446852445 HC PT MOBILITY PROJECTED 1/10/2018 Ashley Claudene Dalton, PT GP, CJ 1    07886363484 HC PT EVAL HIGH COMPLEXITY 3 1/10/2018 Ashley Claudene Dalton, PT 58, GP 1          PT G-Codes  Outcome Measure Options: Tinetti  Score: 9  Functional Limitation: Mobility: Walking and moving around  Mobility: Walking and Moving Around Current Status (): At least 60 percent but less than 80 percent impaired, limited or restricted  Mobility: Walking and Moving Around Goal Status (): At least 20 percent but less than 40 percent impaired, limited or restricted         Ashley Claudene Dalton, PT  1/10/2018

## 2018-01-15 ENCOUNTER — HOSPITAL ENCOUNTER (OUTPATIENT)
Dept: OCCUPATIONAL THERAPY | Facility: HOSPITAL | Age: 73
Setting detail: THERAPIES SERIES
Discharge: HOME | End: 2018-01-15

## 2018-01-15 ENCOUNTER — HOSPITAL ENCOUNTER (OUTPATIENT)
Dept: SPEECH THERAPY | Facility: HOSPITAL | Age: 73
Setting detail: THERAPIES SERIES
Discharge: HOME | End: 2018-01-15

## 2018-01-15 ENCOUNTER — HOSPITAL ENCOUNTER (OUTPATIENT)
Dept: PHYSICAL THERAPY | Facility: HOSPITAL | Age: 73
Setting detail: THERAPIES SERIES
Discharge: HOME | End: 2018-01-15

## 2018-01-15 DIAGNOSIS — G81.91 RIGHT HEMIPARESIS (HCC): Primary | ICD-10-CM

## 2018-01-15 DIAGNOSIS — G81.91 RIGHT HEMIPARESIS (HCC): ICD-10-CM

## 2018-01-15 DIAGNOSIS — G81.94 LEFT HEMIPARESIS (HCC): ICD-10-CM

## 2018-01-15 DIAGNOSIS — I63.9 CEREBROVASCULAR ACCIDENT (CVA), UNSPECIFIED MECHANISM (HCC): Primary | ICD-10-CM

## 2018-01-15 DIAGNOSIS — Z86.73 HISTORY OF STROKE: ICD-10-CM

## 2018-01-15 DIAGNOSIS — R26.81 UNSTEADY GAIT: Primary | ICD-10-CM

## 2018-01-15 PROCEDURE — 97530 THERAPEUTIC ACTIVITIES: CPT | Performed by: OCCUPATIONAL THERAPIST

## 2018-01-15 PROCEDURE — 92507 TX SP LANG VOICE COMM INDIV: CPT | Performed by: SPEECH-LANGUAGE PATHOLOGIST

## 2018-01-15 PROCEDURE — 97110 THERAPEUTIC EXERCISES: CPT

## 2018-01-15 NOTE — PROGRESS NOTES
Outpatient Occupational Therapy Neuro Treatment Note  MARIANO Modi     Patient Name: Sunshine Her  : 1945  MRN: [id number]  Today's Date: 1/15/2018       Visit Date: 01/15/2018    Patient Active Problem List   Diagnosis   • Generalized weakness   • Acute on chronic renal insufficiency   • Anemia   • Symptomatic bradycardia   • Leukopenia   • Hypothyroid   • Bipolar affective disorder   • Kidney function abnormal   • COPD (chronic obstructive pulmonary disease)   • CVA (cerebral vascular accident)-Right Occipital/Temporal   • Chronic kidney disease (CKD), stage III (moderate)   • Dementia   • Vertigo   • History of stroke   • Iron deficiency anemia   • CKD (chronic kidney disease), stage III   • Vascular dementia   • Hypertension   • Hyperlipidemia   • Tobacco abuse   • Malignant hypertension   • Orthostasis   • Impaired functional mobility, balance, gait, and endurance   • Sepsis   • Paroxysmal atrial fibrillation   • Dyslipidemia        Past Medical History:   Diagnosis Date   • A-fib    • Acute on chronic renal insufficiency 7/15/2016   • Anemia    • Anxiety    • Arthritis    • Arthritis    • Bipolar 1 disorder    • Bradycardia    • COPD (chronic obstructive pulmonary disease)    • Coronary artery disease    • Dementia    • Disease of thyroid gland    • Generalized weakness 2016   • GERD (gastroesophageal reflux disease)    • Heart disease    • History of transfusion    • Hyperlipidemia    • Hypertension    • PAD (peripheral artery disease)    • Stroke     2016, 2017, 2017   • TIA (transient ischemic attack)    • Vertigo         Past Surgical History:   Procedure Laterality Date   • ANGIOPLASTY SUBCLAVIAN ARTERY     • CAROTID ENDARTERECTOMY Bilateral    • COLONOSCOPY     • COLONOSCOPY N/A 2017    Procedure: COLONOSCOPY  CPTCODE:00040;  Surgeon: Suraj Quintanilla III, MD;  Location: [facility];  Service:    • ENDOSCOPY     • ENDOSCOPY N/A 2017    Procedure: ESOPHAGOGASTRODUODENOSCOPY  WITH BIOPSY  CPTCODE:74935;  Surgeon: Suraj Quintanilla III, MD;  Location: [facility] OR;  Service:    • ENDOSCOPY W/ PEG TUBE PLACEMENT N/A 6/28/2017    Procedure: ESOPHAGOGASTRODUODENOSCOPY WITH PERCUTANEOUS ENDOSCOPIC GASTROSTOMY TUBE INSERTION;  Surgeon: Renan Montanez MD;  Location: [facility];  Service:    • EYE SURGERY      bilat cataracts   • RENAL ARTERY BYPASS           Visit Dx:    ICD-10-CM ICD-9-CM   1. Right hemiparesis G81.91 342.90                         OT Assessment/Plan       01/15/18 1522       OT Assessment    Assessment Comments Sunshine required mod cues  and hand over hand asssitance today with most therapy tasks.    -AH     OT Plan    OT Plan Comments continue OT  -AH       User Key  (r) = Recorded By, (t) = Taken By, (c) = Cosigned By    Initials Name Provider Type     Dana Ludwig OT Occupational Therapist                             OT Exercises       01/15/18 1500          Subjective Comments    Subjective Comments Sunshine tolerated therapy with rest breaks.  She required min -mod cues today for completion of therapy activities.  -AH      Exercise 1    Exercise Name 1 PROM right UE  -AH      Exercise 2    Exercise Name 2 edema massage right hand  -AH      Exercise 3    Exercise Name 3 gross grasp and release right hand transfering bean bags  -AH      Exercise 4    Exercise Name 4 AROM/AAROM table top dusting right UE  -AH        User Key  (r) = Recorded By, (t) = Taken By, (c) = Cosigned By    Initials Name Provider Type     Dana Ludwig OT Occupational Therapist                        Time Calculation:   OT Start Time: 1400  OT Stop Time: 1445  OT Time Calculation (min): 45 min     Therapy Charges for Today     Code Description Service Date Service Provider Modifiers Qty    45806131002  OT THERAPEUTIC ACT EA 15 MIN 1/15/2018 Dana Ludwig OT GO 3                    Dana Ludwig OT  1/15/2018

## 2018-01-15 NOTE — THERAPY TREATMENT NOTE
Outpatient Physical Therapy Neuro Treatment Note   Rian     Patient Name: Sunshine Her  : 1945  MRN: [id number]  Today's Date: 1/15/2018      Visit Date: 01/15/2018    Visit Dx:    ICD-10-CM ICD-9-CM   1. Unsteady gait R26.81 781.2   2. Right hemiparesis G81.91 342.90   3. History of stroke Z86.73 V12.54   4. Left hemiparesis G81.94 342.90       Patient Active Problem List   Diagnosis   • Generalized weakness   • Acute on chronic renal insufficiency   • Anemia   • Symptomatic bradycardia   • Leukopenia   • Hypothyroid   • Bipolar affective disorder   • Kidney function abnormal   • COPD (chronic obstructive pulmonary disease)   • CVA (cerebral vascular accident)-Right Occipital/Temporal   • Chronic kidney disease (CKD), stage III (moderate)   • Dementia   • Vertigo   • History of stroke   • Iron deficiency anemia   • CKD (chronic kidney disease), stage III   • Vascular dementia   • Hypertension   • Hyperlipidemia   • Tobacco abuse   • Malignant hypertension   • Orthostasis   • Impaired functional mobility, balance, gait, and endurance   • Sepsis   • Paroxysmal atrial fibrillation   • Dyslipidemia                 PT Neuro       01/15/18 1500          Subjective Pain    Able to rate subjective pain? yes  -AC      Pre-Treatment Pain Level 0  -AC      Post-Treatment Pain Level 0  -AC      Bed Mobility, Assessment/Treatment    Bed Mob, Supine to Sit, [place] minimum assist (75% patient effort)  -AC      Bed Mob, Sit to Supine, [place] minimum assist (75% patient effort)  -AC      Transfers    Transfers, Bed-Chair [place] contact guard assist;minimum assist (75% patient effort)  -AC      Transfers, Chair-Bed [place] contact guard assist;minimum assist (75% patient effort)  -AC      Transfers, Bed-Chair-Bed, Assist Device --   w/c and PTA arms  -AC      Transfers, Sit-Stand [place] contact guard assist  -AC      Transfers, Stand-Sit [place] minimum assist (75% patient effort)   -AC      Transfers, Sit-Stand-Sit, Assist Device other (see comments)   push up from bed and w/c  -AC        User Key  (r) = Recorded By, (t) = Taken By, (c) = Cosigned By    Initials Name Provider Type    BARBARA Lira PTA Physical Therapy Assistant                        PT Assessment/Plan       01/15/18 1539       PT Assessment    Assessment Comments New ther ex added per the patient's tolerance, patient demonstrated and understood new ther ex. Patient tolerated treatment session well with rest breaks taken as needed by the patient. Patient needed CGA when tranferring from w/c to bed and bed to w/c. No adverse reactions with treatment.   -AC     PT Plan    PT Plan Comments Continue per PT's POC, progress per the patient's tolerance.  -AC       User Key  (r) = Recorded By, (t) = Taken By, (c) = Cosigned By    Initials Name Provider Type    BARBARA Lira PTA Physical Therapy Assistant                     Exercises       01/15/18 1500          Subjective Comments    Subjective Comments    -AC      Subjective Pain    Able to rate subjective pain? yes  -AC      Pre-Treatment Pain Level 0  -AC      Post-Treatment Pain Level 0  -AC      Exercise 1    Exercise Name 1 seated march 10x2, LAQ 10x2, ball squeeze 10x2, knee flex with RTB 10x2, hip abd with RTB 10x2,  QS 10x2, SAQ 10x2, SLR 10x2, GS x10, sit to stand x5  -AC      Cueing 1 Verbal;Tactile;Demo  -AC      Time (Minutes) 1 30 minutes  -AC        User Key  (r) = Recorded By, (t) = Taken By, (c) = Cosigned By    Initials Name Provider Type    BARBARA Lira PTA Physical Therapy Assistant                            Therapy Education  Given: HEP, Symptoms/condition management, Pain management, Posture/body mechanics  Program: New  How Provided: Verbal, Demonstration  Provided to: Patient  Level of Understanding: Verbalized, Demonstrated              Time Calculation:   Start Time: 1445  Stop Time: 1530  Time Calculation (min): 45 min      Therapy Charges for Today     Code Description Service Date Service Provider Modifiers Qty    80416003814  PT THER PROC EA 15 MIN 1/15/2018 Celina Lira PTA GP 2    62887626830 HC PT THER SUPP EA 15 MIN 1/15/2018 Celina Lira PTA GP 1                    Celina Lira PTA  1/15/2018

## 2018-01-15 NOTE — PROGRESS NOTES
Outpatient Speech Language Pathology   Adult Speech Language Cognitive Treatment Note  MARIANO Modi     Patient Name: Sunshine Her  : 1945  MRN: [id number]  Today's Date: 1/15/2018         Visit Date: 01/15/2018   Patient Active Problem List   Diagnosis   • Generalized weakness   • Acute on chronic renal insufficiency   • Anemia   • Symptomatic bradycardia   • Leukopenia   • Hypothyroid   • Bipolar affective disorder   • Kidney function abnormal   • COPD (chronic obstructive pulmonary disease)   • CVA (cerebral vascular accident)-Right Occipital/Temporal   • Chronic kidney disease (CKD), stage III (moderate)   • Dementia   • Vertigo   • History of stroke   • Iron deficiency anemia   • CKD (chronic kidney disease), stage III   • Vascular dementia   • Hypertension   • Hyperlipidemia   • Tobacco abuse   • Malignant hypertension   • Orthostasis   • Impaired functional mobility, balance, gait, and endurance   • Sepsis   • Paroxysmal atrial fibrillation   • Dyslipidemia       Visit Dx:    ICD-10-CM ICD-9-CM   1. Cerebrovascular accident (CVA), unspecified mechanism I63.9 434.91             Adult Speech Language - 01/10/18 1800     Background and History    Reason for Referral Pt had CVA in 2017. Pt referred as daughter reports pt has regressed since d/c from SNF  -CJ    Stated Goals Per daughter, to improve her overall functional communication to be able to get around better at home.   -CJ    Description of Complaint Pt currently is unable to communicate wants and needs to independently complete adls.   -CJ    Previous Functional Status Memory was Impaired;Speech was fluent;Functional for Activities of Daily Living with assistance;Voice was normal in all environments;Attention was functional  -CJ    Current Baseline Abilities Pt currently communicates primarily through gestures, yes/no head nod, and can say a few words per daughter.   -CJ    Pertinent Medications Please see chart review for full medication  summary  -CJ    Primary Language in the Home english  -CJ    Primary Caregiver Daughter  -CJ    Informant for the Evaluation Other (comment)   Daughter, pt lives w/  -CJ    Comprehension    Recognition common objects;pictures;to be assessed in therapy  -CJ    Common Objects Moderate  -CJ    Pictures Mild moderate  -CJ    Answer Questions general info yes/no;complex yes/no questions;abstract yes/no;questions about a paragraph;questions about multiple paragraphs  -CJ    Abstract Yes/No Questions Unable to assess  -CJ    Questions About a Paragraph Unable to assess  -CJ    Questions About Multiple Paragraphs Unable to assess  -CJ    Commands multistep basic commands;complex/abstract commands  -CJ    Multistep Basic Commands Severe;Moderate severe  -CJ    Complex/Abstract Commands CHRISTOPHER: Unable to assess  -CJ    Conversation simple conversation;complex conversation  -CJ    Simple Conversation Severe  -CJ    Complex Conversation Profound  -CJ    Reading Status unable to assess  -CJ    Expression    Primary Mode of Expression nonverbal - pointing;nonverbal - gestures;verbal   1 word responses  -CJ    Dominant Hand Right  -CJ    Expressive Language --   Severe  -CJ    Receptive Language --   Severe  -CJ    Written Expression copy shapes;legibility;copy letters;copy words;copy sentences;single word formulation;phrase formulation;applied functional writing tasks  -CJ    Legibility severe  -CJ    Copy Shapes severe  -CJ    Copy Letters severe  -CJ    Copy Words severe  -CJ    Copy Sentences severe  -CJ    Single Word Formulation severe  -CJ    Phrase Formulation severe  -CJ    Applied Functional Writing Tasks severe  -CJ    Cognitive Communication and Memory    Attention selective attention;alternating attention;divided attention  -CJ    Sustained Attention Moderate severe  -CJ    Alternating Attention Moderate severe  -CJ    Orientation unable to assess  -CJ    Executive Function unable to assess  -CJ    Calculations unable to  assess  -CJ    Oral Motor    Facial Appearance WFL  -CJ    Dentition adequate  -CJ    AMR's and SMR's    Alternate Motion Rates could not test  -CJ    Oral Motor Planning    Imitating Isolated Oral Movements accurate  -CJ    Producing Isolated Oral Movement on Command accurate, immediate, on command  -CJ    Imitating Sequental Oral Movements accurate  -CJ    Comprehensibility of Message    Message is Usually Comprehensible To: no one  -CJ    Uses Strategies to Improve Comprehensibility not at all  -CJ      User Key  (r) = Recorded By, (t) = Taken By, (c) = Cosigned By    Initials Name Provider Type    CJ Sharita Day Speech Therapist         Long term goals:  1. Patient will improve expressive language skills to allow for maximal functional communication and independence in adls.          2. Patient will improve receptive language skills to allow for maximal functional communication and independence in adls.         Short term goals:  1. Patient will produce rote tasks w/ max cues at 90% across 3 consecutive sessions.   *Pt produces rote tasks w/ max cues at 80% accuracy.   2.  Patient will produce personal identifiers w/ max cues/models 3/5 opp over 3 consecutive sessions.   *Pt produces personal identifiers w/ max cues 2/5 opp.   3.  Patientwill imitate confrontation naming of simple, common objects w/ max cues and model 80% in gross approximation across 3 consecutive sessions.   *Pt imitates confrontation naming of simple, common objects w/ max cues and model 70% in gross approximation.  4.  Patient will match word to object 80% across 3 consecutive sessions w/ max cues.   *Pt matches word to object 70% this session.  5.  Patient will respond to moderate y/n questions at 80% w/ max cues across 3 consecutive sessions.   Pt responds to moderate y/n questions at 70% w/ max cues.  6.  Patient will follow simple functional directives in adls 2-3 steps, w/max cues across 3 consecutive sessions.   *goal not directly  "addressed this session.  7.  Patient will copy/trace her name common words at 80% w/ max cues across 3 consecutive sessions.  Pt copies first and last name with 70% accuracy this date. Max cues for \"bigger\" writing.    8. Patient will repeat at single syllable level in gross approximation w/ max cues w/ 50% acc across 3 consecutive sessions  *Pt repeats at single syllable level in gross approximation 40% accuracy w/ max cues.  *Further goals may be added pending progress towards this POC     Education: Pt educated on POC, goals, and strategies to reach goals. Pt head nods in agreement.    Thank you-  Leah Aparicio MA, CCC-SLP          OP SLP Education       01/15/18 5008    Education    Education Comments d/w with pt goals and strategies to reach goals w/ pt head nodding in agreement.  -      User Key  (r) = Recorded By, (t) = Taken By, (c) = Cosigned By    Initials Name Effective Dates    SS Leah Aparicio MA,CCC-SLP 12/20/17 -           Time Calculation:   SLP Start Time: 1330  SLP Stop Time: 1400  SLP Time Calculation (min): 30 min  SLP Non-Billable Time (min): 30 min    Therapy Charges for Today     Code Description Service Date Service Provider Modifiers Qty    87891206510  ST TREATMENT SPEECH 2 1/15/2018 Leah Aparicio MA,CCC-SLP 59, GN 1    50249718409 HC ST CARE PLAN 15 MIN 1/15/2018 Leah Aparicio MA,CCC-SLP GN 1            Leah Aparicio MA,CCC-SLP  1/15/2018  "

## 2018-01-17 ENCOUNTER — APPOINTMENT (OUTPATIENT)
Dept: PHYSICAL THERAPY | Facility: HOSPITAL | Age: 73
End: 2018-01-17

## 2018-01-22 ENCOUNTER — HOSPITAL ENCOUNTER (OUTPATIENT)
Dept: OCCUPATIONAL THERAPY | Facility: HOSPITAL | Age: 73
Setting detail: THERAPIES SERIES
Discharge: HOME | End: 2018-01-22

## 2018-01-22 ENCOUNTER — HOSPITAL ENCOUNTER (OUTPATIENT)
Dept: SPEECH THERAPY | Facility: HOSPITAL | Age: 73
Setting detail: THERAPIES SERIES
Discharge: HOME | End: 2018-01-22

## 2018-01-22 ENCOUNTER — HOSPITAL ENCOUNTER (OUTPATIENT)
Dept: PHYSICAL THERAPY | Facility: HOSPITAL | Age: 73
Setting detail: THERAPIES SERIES
Discharge: HOME | End: 2018-01-22

## 2018-01-22 DIAGNOSIS — I63.9 CEREBROVASCULAR ACCIDENT (CVA), UNSPECIFIED MECHANISM (HCC): Primary | ICD-10-CM

## 2018-01-22 DIAGNOSIS — R26.81 UNSTEADY GAIT: Primary | ICD-10-CM

## 2018-01-22 DIAGNOSIS — G81.91 RIGHT HEMIPARESIS (HCC): Primary | ICD-10-CM

## 2018-01-22 DIAGNOSIS — G81.91 RIGHT HEMIPARESIS (HCC): ICD-10-CM

## 2018-01-22 PROCEDURE — 97112 NEUROMUSCULAR REEDUCATION: CPT

## 2018-01-22 PROCEDURE — 92507 TX SP LANG VOICE COMM INDIV: CPT | Performed by: SPEECH-LANGUAGE PATHOLOGIST

## 2018-01-22 PROCEDURE — 97530 THERAPEUTIC ACTIVITIES: CPT | Performed by: OCCUPATIONAL THERAPIST

## 2018-01-22 PROCEDURE — 97110 THERAPEUTIC EXERCISES: CPT

## 2018-01-22 NOTE — PROGRESS NOTES
Outpatient Occupational Therapy Neuro Treatment Note  MARIANO Modi     Patient Name: Sunshine Her  : 1945  MRN: [id number]  Today's Date: 2018       Visit Date: 2018    Patient Active Problem List   Diagnosis   • Generalized weakness   • Acute on chronic renal insufficiency   • Anemia   • Symptomatic bradycardia   • Leukopenia   • Hypothyroid   • Bipolar affective disorder   • Kidney function abnormal   • COPD (chronic obstructive pulmonary disease)   • CVA (cerebral vascular accident)-Right Occipital/Temporal   • Chronic kidney disease (CKD), stage III (moderate)   • Dementia   • Vertigo   • History of stroke   • Iron deficiency anemia   • CKD (chronic kidney disease), stage III   • Vascular dementia   • Hypertension   • Hyperlipidemia   • Tobacco abuse   • Malignant hypertension   • Orthostasis   • Impaired functional mobility, balance, gait, and endurance   • Sepsis   • Paroxysmal atrial fibrillation   • Dyslipidemia        Past Medical History:   Diagnosis Date   • A-fib    • Acute on chronic renal insufficiency 7/15/2016   • Anemia    • Anxiety    • Arthritis    • Arthritis    • Bipolar 1 disorder    • Bradycardia    • COPD (chronic obstructive pulmonary disease)    • Coronary artery disease    • Dementia    • Disease of thyroid gland    • Generalized weakness 2016   • GERD (gastroesophageal reflux disease)    • Heart disease    • History of transfusion    • Hyperlipidemia    • Hypertension    • PAD (peripheral artery disease)    • Stroke     2016, 2017, 2017   • TIA (transient ischemic attack)    • Vertigo         Past Surgical History:   Procedure Laterality Date   • ANGIOPLASTY SUBCLAVIAN ARTERY     • CAROTID ENDARTERECTOMY Bilateral    • COLONOSCOPY     • COLONOSCOPY N/A 2017    Procedure: COLONOSCOPY  CPTCODE:50930;  Surgeon: Suraj Quintanilla III, MD;  Location: [facility];  Service:    • ENDOSCOPY     • ENDOSCOPY N/A 2017    Procedure: ESOPHAGOGASTRODUODENOSCOPY  WITH BIOPSY  CPTCODE:95858;  Surgeon: Suraj Quintanilla III, MD;  Location: [facility] OR;  Service:    • ENDOSCOPY W/ PEG TUBE PLACEMENT N/A 6/28/2017    Procedure: ESOPHAGOGASTRODUODENOSCOPY WITH PERCUTANEOUS ENDOSCOPIC GASTROSTOMY TUBE INSERTION;  Surgeon: Renan Montanez MD;  Location: [facility];  Service:    • EYE SURGERY      bilat cataracts   • RENAL ARTERY BYPASS           Visit Dx:    ICD-10-CM ICD-9-CM   1. Right hemiparesis G81.91 342.90                         OT Assessment/Plan       01/22/18 1520       OT Assessment    Assessment Comments Sunshine tolerated therapy well.  She continues to require mod cues and hand over hand at times to complete activities.  -AH     OT Plan    OT Plan Comments continue OT  -       User Key  (r) = Recorded By, (t) = Taken By, (c) = Cosigned By    Initials Name Provider Type     Dana Ludwig OT Occupational Therapist                             OT Exercises       01/22/18 1500          Subjective Comments    Subjective Comments Sunshine tolerated therapy with rest breaks provided  -      Exercise 1    Exercise Name 1 PROM right UE  -AH      Exercise 2    Exercise Name 2 edema massage  -      Exercise 3    Exercise Name 3 gross grasp right hand and UE  -AH      Exercise 4    Exercise Name 4 A/AROM table top dusitng right  -AH      Exercise 5    Exercise Name 5 handgripper right hand  -AH        User Key  (r) = Recorded By, (t) = Taken By, (c) = Cosigned By    Initials Name Provider Type     Dana Ludwig OT Occupational Therapist                        Time Calculation:   OT Start Time: 1400  OT Stop Time: 1450  OT Time Calculation (min): 50 min     Therapy Charges for Today     Code Description Service Date Service Provider Modifiers Qty    [id number]  OT THERAPEUTIC ACT EA 15 MIN 1/22/2018 Dana Ludwig OT GO 3                    Dana Ludwig OT  1/22/2018

## 2018-01-22 NOTE — THERAPY TREATMENT NOTE
"    Outpatient Physical Therapy Neuro Treatment Note   Rian     Patient Name: Sunshine Her  : 1945  MRN: [id number]  Today's Date: 2018      Visit Date: 2018    Visit Dx:    ICD-10-CM ICD-9-CM   1. Unsteady gait R26.81 781.2   2. Right hemiparesis G81.91 342.90       Patient Active Problem List   Diagnosis   • Generalized weakness   • Acute on chronic renal insufficiency   • Anemia   • Symptomatic bradycardia   • Leukopenia   • Hypothyroid   • Bipolar affective disorder   • Kidney function abnormal   • COPD (chronic obstructive pulmonary disease)   • CVA (cerebral vascular accident)-Right Occipital/Temporal   • Chronic kidney disease (CKD), stage III (moderate)   • Dementia   • Vertigo   • History of stroke   • Iron deficiency anemia   • CKD (chronic kidney disease), stage III   • Vascular dementia   • Hypertension   • Hyperlipidemia   • Tobacco abuse   • Malignant hypertension   • Orthostasis   • Impaired functional mobility, balance, gait, and endurance   • Sepsis   • Paroxysmal atrial fibrillation   • Dyslipidemia                 PT Neuro       18 7071          Subjective Comments    Subjective Comments Patient asked if she was having pain, patient reports, \"no.\"  -AC      Subjective Pain    Able to rate subjective pain? yes  -AC      Pre-Treatment Pain Level 0  -AC      Post-Treatment Pain Level 0  -AC      Bed Mobility, Assessment/Treatment    Bed Mob, Supine to Sit, [place] minimum assist (75% patient effort)  -AC      Bed Mob, Sit to Supine, [place] minimum assist (75% patient effort)  -AC      Transfers    Transfers, Bed-Chair [place] contact guard assist;minimum assist (75% patient effort)  -AC      Transfers, Chair-Bed [place] contact guard assist;minimum assist (75% patient effort)  -AC      Transfers, Bed-Chair-Bed, Assist Device --   w/c   -AC      Transfers, Sit-Stand [place] contact guard assist  -AC      Transfers, Stand-Sit [place] minimum " "assist (75% patient effort)  -AC      Transfers, Sit-Stand-Sit, Assist Device --   push up from bed and w/c  -AC      Balance Skills Training    Standing-Level of Assistance Contact guard;Minimum assistance  -AC        User Key  (r) = Recorded By, (t) = Taken By, (c) = Cosigned By    Initials Name Provider Type    BARBARA Lira PTA Physical Therapy Assistant                        PT Assessment/Plan       01/22/18 1552       PT Assessment    Assessment Comments Patient tolerated tretament session well with rest breaks taken as needed by the patient. New balance activities added to treatment session with difficulty noted in during semi tandem balance. Verbal cues required during treatment session for patient to stay on task and perform exercises properly. Blood pressure taken during treatment session, BP: 122/66 mmHg, heart rate: 84 beats per minute, o2: 96%. Patient reports of being fatigued following treatment session. Post treatment session o2: 95%, heart rate: 84 beats per minute.   -AC     PT Plan    PT Plan Comments Continue per PT's POC, progress per the patient's tolerance.  -AC       User Key  (r) = Recorded By, (t) = Taken By, (c) = Cosigned By    Initials Name Provider Type    BARBARA Lira PTA Physical Therapy Assistant                     Exercises       01/22/18 1549          Subjective Comments    Subjective Comments Patient asked if she was having pain, patient reports, \"no.\"  -AC      Subjective Pain    Able to rate subjective pain? yes  -AC      Pre-Treatment Pain Level 0  -AC      Post-Treatment Pain Level 0  -AC      Exercise 1    Exercise Name 1 seated march 10x2, ball squeeze 10x2, LAQ 10x2, knee flex with GTB 10x2, hip abd with GTB 10x2 (VC's), sit to stand x5, QS 10x2 (VCs), SAQ 10x2  -AC      Cueing 1 Verbal;Tactile;Demo  -AC      Time (Minutes) 1 30 minutes  -AC      Exercise 2    Exercise Name 2 Neuro: feet side by side EO/EC, semi tandem balance, gait in the //bars  " -AC      Cueing 2 Verbal;Tactile;Demo  -AC      Time (Minutes) 2 15 minutes  -AC        User Key  (r) = Recorded By, (t) = Taken By, (c) = Cosigned By    Initials Name Provider Type    AC Celina Lira PTA Physical Therapy Assistant                            Therapy Education  Given: Symptoms/condition management, HEP, Posture/body mechanics, Fall prevention and home safety, Mobility training  Program: New  How Provided: Verbal, Demonstration  Level of Understanding: Verbalized, Demonstrated              Time Calculation:   Start Time: 1445  Stop Time: 1540  Time Calculation (min): 55 min     Therapy Charges for Today     Code Description Service Date Service Provider Modifiers Qty    65867602490  PT THER PROC EA 15 MIN 1/22/2018 Celina Lira PTA 59, GP 2    [id number]  PT NEUROMUSC RE EDUCATION EA 15 MIN 1/22/2018 Celina Lira PTA 59, GP 1                    Celina Lira PTA  1/22/2018

## 2018-01-22 NOTE — PROGRESS NOTES
Outpatient Speech Language Pathology   Adult Speech Language Cognitive Treatment Note  MARIANO Modi     Patient Name: Sunshine Her  : 1945  MRN: [id number]  Today's Date: 2018         Visit Date: 2018   Patient Active Problem List   Diagnosis   • Generalized weakness   • Acute on chronic renal insufficiency   • Anemia   • Symptomatic bradycardia   • Leukopenia   • Hypothyroid   • Bipolar affective disorder   • Kidney function abnormal   • COPD (chronic obstructive pulmonary disease)   • CVA (cerebral vascular accident)-Right Occipital/Temporal   • Chronic kidney disease (CKD), stage III (moderate)   • Dementia   • Vertigo   • History of stroke   • Iron deficiency anemia   • CKD (chronic kidney disease), stage III   • Vascular dementia   • Hypertension   • Hyperlipidemia   • Tobacco abuse   • Malignant hypertension   • Orthostasis   • Impaired functional mobility, balance, gait, and endurance   • Sepsis   • Paroxysmal atrial fibrillation   • Dyslipidemia       Visit Dx:    ICD-10-CM ICD-9-CM   1. Cerebrovascular accident (CVA), unspecified mechanism I63.9 434.91          Long term goals:  1. Patient will improve expressive language skills to allow for maximal functional communication and independence in adls.          2. Patient will improve receptive language skills to allow for maximal functional communication and independence in adls.         Short term goals:  1. Patient will produce rote tasks w/ max cues at 90% across 3 consecutive sessions.   *Pt produces rote tasks w/ max cues at 80% accuracy.   2.  Patient will produce personal identifiers w/ max cues/models 3/5 opp over 3 consecutive sessions.   *Pt produces personal identifiers 2/5 opp mod-max cues.   3.  Patientwill imitate confrontation naming of simple, common objects w/ max cues and model 80% in gross approximation across 3 consecutive sessions.   *Pt imitates confrontation naming of simple, common objects w/ max cues and model 70% in  "gross approximation.  4.  Patient will match word to object 80% across 3 consecutive sessions w/ max cues.   *Pt matches word to object 80% this session w/ max cues.  5.  Patient will respond to moderate y/n questions at 80% w/ max cues across 3 consecutive sessions.   Pt responds to moderate y/n questions at 70% w/ max cues.  6.  Patient will follow simple functional directives in adls 2-3 steps, w/max cues across 3 consecutive sessions.   *Pt follows 2 step simple directives w/ mod-max cues.  7.  Patient will copy/trace her name common words at 80% w/ max cues across 3 consecutive sessions.  Pt copies first and last name with 70% accuracy this date. Max cues for \"bigger\" writing.    8. Patient will repeat at single syllable level in gross approximation w/ max cues w/ 50% acc across 3 consecutive sessions  *Pt repeats at single syllable level in gross approximation 40% accuracy w/ max cues.    *Further goals may be added pending progress towards this POC     Education: Pt educated on progress during today's session. Pt head nods in agreement.    Thank you-  Leah Aparicio M.A., CCC-SLP          OP SLP Education       01/22/18 1407    Education    Education Comments d/w with pt progress during today's session w/ pt acknowledging via head nod.  -      User Key  (r) = Recorded By, (t) = Taken By, (c) = Cosigned By    Initials Name Effective Dates    SS Leah Aparicio MA,CCC-SLP 12/20/17 -           Time Calculation:   SLP Start Time: 1320  SLP Stop Time: 1400  SLP Time Calculation (min): 40 min  SLP Non-Billable Time (min): 30 min    Therapy Charges for Today     Code Description Service Date Service Provider Modifiers Qty    03458905824  ST TREATMENT SPEECH 3 1/22/2018 Leah Aparicio MA,CCC-SLP 59, GN 1    99981960735 HC ST CARE PLAN 15 MIN 1/22/2018 Leah Aparicio MA,CCC-SLP GN 2            Leah Aparicio MA,CCC-SLP  1/22/2018  "

## 2018-01-24 ENCOUNTER — HOSPITAL ENCOUNTER (OUTPATIENT)
Dept: SPEECH THERAPY | Facility: HOSPITAL | Age: 73
Setting detail: THERAPIES SERIES
Discharge: HOME | End: 2018-01-24

## 2018-01-24 ENCOUNTER — HOSPITAL ENCOUNTER (OUTPATIENT)
Dept: PHYSICAL THERAPY | Facility: HOSPITAL | Age: 73
Setting detail: THERAPIES SERIES
Discharge: HOME | End: 2018-01-24

## 2018-01-24 ENCOUNTER — HOSPITAL ENCOUNTER (OUTPATIENT)
Dept: OCCUPATIONAL THERAPY | Facility: HOSPITAL | Age: 73
Setting detail: THERAPIES SERIES
Discharge: HOME | End: 2018-01-24

## 2018-01-24 DIAGNOSIS — I63.9 CEREBROVASCULAR ACCIDENT (CVA), UNSPECIFIED MECHANISM (HCC): Primary | ICD-10-CM

## 2018-01-24 DIAGNOSIS — G81.91 RIGHT HEMIPARESIS (HCC): Primary | ICD-10-CM

## 2018-01-24 DIAGNOSIS — R26.81 UNSTEADY GAIT: Primary | ICD-10-CM

## 2018-01-24 DIAGNOSIS — G81.91 RIGHT HEMIPARESIS (HCC): ICD-10-CM

## 2018-01-24 PROCEDURE — 97112 NEUROMUSCULAR REEDUCATION: CPT

## 2018-01-24 PROCEDURE — 92507 TX SP LANG VOICE COMM INDIV: CPT | Performed by: SPEECH-LANGUAGE PATHOLOGIST

## 2018-01-24 PROCEDURE — 97110 THERAPEUTIC EXERCISES: CPT

## 2018-01-24 PROCEDURE — 97116 GAIT TRAINING THERAPY: CPT

## 2018-01-24 PROCEDURE — 97530 THERAPEUTIC ACTIVITIES: CPT | Performed by: OCCUPATIONAL THERAPIST

## 2018-01-24 NOTE — THERAPY TREATMENT NOTE
"    Outpatient Physical Therapy Neuro Treatment Note   Rian     Patient Name: Sunshine Her  : 1945  MRN: [id number]  Today's Date: 2018      Visit Date: 2018    Visit Dx:    ICD-10-CM ICD-9-CM   1. Unsteady gait R26.81 781.2   2. Right hemiparesis G81.91 342.90       Patient Active Problem List   Diagnosis   • Generalized weakness   • Acute on chronic renal insufficiency   • Anemia   • Symptomatic bradycardia   • Leukopenia   • Hypothyroid   • Bipolar affective disorder   • Kidney function abnormal   • COPD (chronic obstructive pulmonary disease)   • CVA (cerebral vascular accident)-Right Occipital/Temporal   • Chronic kidney disease (CKD), stage III (moderate)   • Dementia   • Vertigo   • History of stroke   • Iron deficiency anemia   • CKD (chronic kidney disease), stage III   • Vascular dementia   • Hypertension   • Hyperlipidemia   • Tobacco abuse   • Malignant hypertension   • Orthostasis   • Impaired functional mobility, balance, gait, and endurance   • Sepsis   • Paroxysmal atrial fibrillation   • Dyslipidemia                 PT Neuro       18 1600          Subjective Comments    Subjective Comments Patient asked if she is having any pain or new symptoms, patient replies, \"no.\"  -AC      Subjective Pain    Able to rate subjective pain? yes  -AC      Pre-Treatment Pain Level 0  -AC      Post-Treatment Pain Level 0  -AC        User Key  (r) = Recorded By, (t) = Taken By, (c) = Cosigned By    Initials Name Provider Type    BARBARA Lira, PTA Physical Therapy Assistant                        PT Assessment/Plan       18 1631       PT Assessment    Assessment Comments New ther ex added per the patient's tolerance, patient demonstrated and understood new ther ex with no increase in pain noted. Patient tolerated treatment session well with rest breaks needed throughout treatment session. Patient demonstrated difficulty with semi tandem balance and with standing " "exercises. O2 stats checked throughout treatment session, 93%-96%. No adverse reactions with treatment session.  -AC     PT Plan    PT Plan Comments Continue per PT's POC, progress per the patient's tolerance.  -AC       User Key  (r) = Recorded By, (t) = Taken By, (c) = Cosigned By    Initials Name Provider Type     Celina Lira, PTA Physical Therapy Assistant                     Exercises       01/24/18 1600          Subjective Comments    Subjective Comments Patient asked if she is having any pain or new symptoms, patient replies, \"no.\"  -AC      Subjective Pain    Able to rate subjective pain? yes  -AC      Pre-Treatment Pain Level 0  -AC      Post-Treatment Pain Level 0  -AC      Exercise 1    Exercise Name 1 seated march 10x2, LAQ 10x2, hip abd with GTB 10x2, ball squeeze 10x2, sit to stand x10, St. HR 10x2  -AC      Cueing 1 Verbal;Tactile;Demo  -AC      Time (Minutes) 1 25 minutes  -AC      Exercise 2    Exercise Name 2 Neuro: semi tandem, step up on foam x10, feet side by side EO/EC   -AC      Cueing 2 Verbal;Tactile;Demo  -AC      Time (Minutes) 2 15 minutes  -AC      Exercise 3    Exercise Name 3 Gait: 70ft with quad cane with cues needed.   -AC      Cueing 3 Verbal;Tactile  -AC      Time (Seconds) 3 10 minutes  -AC        User Key  (r) = Recorded By, (t) = Taken By, (c) = Cosigned By    Initials Name Provider Type     Celina Lira, PTA Physical Therapy Assistant                            Therapy Education  Given: HEP, Symptoms/condition management, Pain management, Mobility training, Fall prevention and home safety  Program: New  How Provided: Verbal, Demonstration  Provided to: Patient  Level of Understanding: Verbalized, Demonstrated              Time Calculation:   Start Time: 1355  Stop Time: 1453  Time Calculation (min): 58 min     Therapy Charges for Today     Code Description Service Date Service Provider Modifiers Qty    34472921582  PT THER SUPP EA 15 MIN 1/24/2018 " Celina Lira, PTA GP 1    14916068147 HC PT THER PROC EA 15 MIN 1/24/2018 Celina Lira, PTA GP, 59 2    [id number] HC PT NEUROMUSC RE EDUCATION EA 15 MIN 1/24/2018 Celina Lira, PTA GP, 59 1                    Celina Lira, PTA  1/24/2018

## 2018-01-24 NOTE — PROGRESS NOTES
Outpatient Occupational Therapy Neuro Treatment Note  MARIANO Modi     Patient Name: Sunshine Her  : 1945  MRN: [id number]  Today's Date: 2018       Visit Date: 2018    Patient Active Problem List   Diagnosis   • Generalized weakness   • Acute on chronic renal insufficiency   • Anemia   • Symptomatic bradycardia   • Leukopenia   • Hypothyroid   • Bipolar affective disorder   • Kidney function abnormal   • COPD (chronic obstructive pulmonary disease)   • CVA (cerebral vascular accident)-Right Occipital/Temporal   • Chronic kidney disease (CKD), stage III (moderate)   • Dementia   • Vertigo   • History of stroke   • Iron deficiency anemia   • CKD (chronic kidney disease), stage III   • Vascular dementia   • Hypertension   • Hyperlipidemia   • Tobacco abuse   • Malignant hypertension   • Orthostasis   • Impaired functional mobility, balance, gait, and endurance   • Sepsis   • Paroxysmal atrial fibrillation   • Dyslipidemia        Past Medical History:   Diagnosis Date   • A-fib    • Acute on chronic renal insufficiency 7/15/2016   • Anemia    • Anxiety    • Arthritis    • Arthritis    • Bipolar 1 disorder    • Bradycardia    • COPD (chronic obstructive pulmonary disease)    • Coronary artery disease    • Dementia    • Disease of thyroid gland    • Generalized weakness 2016   • GERD (gastroesophageal reflux disease)    • Heart disease    • History of transfusion    • Hyperlipidemia    • Hypertension    • PAD (peripheral artery disease)    • Stroke     2016, 2017, 2017   • TIA (transient ischemic attack)    • Vertigo         Past Surgical History:   Procedure Laterality Date   • ANGIOPLASTY SUBCLAVIAN ARTERY     • CAROTID ENDARTERECTOMY Bilateral    • COLONOSCOPY     • COLONOSCOPY N/A 2017    Procedure: COLONOSCOPY  CPTCODE:11973;  Surgeon: Suraj Quintanilla III, MD;  Location: [facility];  Service:    • ENDOSCOPY     • ENDOSCOPY N/A 2017    Procedure: ESOPHAGOGASTRODUODENOSCOPY  WITH BIOPSY  CPTCODE:57399;  Surgeon: Suraj Quintanilla III, MD;  Location: [facility] OR;  Service:    • ENDOSCOPY W/ PEG TUBE PLACEMENT N/A 6/28/2017    Procedure: ESOPHAGOGASTRODUODENOSCOPY WITH PERCUTANEOUS ENDOSCOPIC GASTROSTOMY TUBE INSERTION;  Surgeon: Renan Montanez MD;  Location: [facility];  Service:    • EYE SURGERY      bilat cataracts   • RENAL ARTERY BYPASS           Visit Dx:    ICD-10-CM ICD-9-CM   1. Right hemiparesis G81.91 342.90                         OT Assessment/Plan       01/24/18 1607       OT Assessment    Assessment Comments Sunshine required mod cues today to complete activities today.    -AH     OT Plan    OT Plan Comments continue OT  -AH       User Key  (r) = Recorded By, (t) = Taken By, (c) = Cosigned By    Initials Name Provider Type     Dana Ludwig OT Occupational Therapist                             OT Exercises       01/24/18 1600          Exercise 1    Exercise Name 1 PROM and right UE  -AH      Exercise 2    Exercise Name 2 overhead pulleys withassist of flexion glove  -AH      Exercise 3    Exercise Name 3 handgripper right hand  -AH        User Key  (r) = Recorded By, (t) = Taken By, (c) = Cosigned By    Initials Name Provider Type    ZULEYMA Ludwig OT Occupational Therapist                        Time Calculation:   OT Start Time: 1450  OT Stop Time: 1540  OT Time Calculation (min): 50 min     Therapy Charges for Today     Code Description Service Date Service Provider Modifiers Qty    88983465124  OT THERAPEUTIC ACT EA 15 MIN 1/24/2018 Dana Ludwig OT GO 3                    Dana Ludwig OT  1/24/2018

## 2018-01-24 NOTE — PROGRESS NOTES
Outpatient Speech Language Pathology   Adult Speech Language Cognitive Treatment Note  MARIANO Modi     Patient Name: Sunshine Her  : 1945  MRN: [id number]  Today's Date: 2018         Visit Date: 2018   Patient Active Problem List   Diagnosis   • Generalized weakness   • Acute on chronic renal insufficiency   • Anemia   • Symptomatic bradycardia   • Leukopenia   • Hypothyroid   • Bipolar affective disorder   • Kidney function abnormal   • COPD (chronic obstructive pulmonary disease)   • CVA (cerebral vascular accident)-Right Occipital/Temporal   • Chronic kidney disease (CKD), stage III (moderate)   • Dementia   • Vertigo   • History of stroke   • Iron deficiency anemia   • CKD (chronic kidney disease), stage III   • Vascular dementia   • Hypertension   • Hyperlipidemia   • Tobacco abuse   • Malignant hypertension   • Orthostasis   • Impaired functional mobility, balance, gait, and endurance   • Sepsis   • Paroxysmal atrial fibrillation   • Dyslipidemia       Visit Dx:    ICD-10-CM ICD-9-CM   1. Cerebrovascular accident (CVA), unspecified mechanism I63.9 434.91          Long term goals:  1. Patient will improve expressive language skills to allow for maximal functional communication and independence in adls.          2. Patient will improve receptive language skills to allow for maximal functional communication and independence in adls.         Short term goals:  1. Patient will produce rote tasks w/ max cues at 90% across 3 consecutive sessions.   *Pt produces rote tasks w/ max cues at 80% accuracy.   2.  Patient will produce personal identifiers w/ max cues/models 3/5 opp over 3 consecutive sessions.   *Pt produces personal identifiers 2/5 opp mod cues.   3.  Patientwill imitate confrontation naming of simple, common objects w/ max cues and model 80% in gross approximation across 3 consecutive sessions.   *Pt imitates confrontation naming of simple, common objects w/ mod cues and model 70% in  "gross approximation.  4.  Patient will match word to object 80% across 3 consecutive sessions w/ max cues.   *Pt matches word to object 70% this session w/ max cues.  5.  Patient will respond to moderate y/n questions at 80% w/ max cues across 3 consecutive sessions.   Pt responds to moderate y/n questions at 70% w/ max cues.  6.  Patient will follow simple functional directives in adls 2-3 steps, w/max cues across 3 consecutive sessions.   *Pt follows 1 step simple directives w/ mod cues.  7.  Patient will copy/trace her name common words at 80% w/ max cues across 3 consecutive sessions.  Pt copies first and last name with 70% accuracy this date. Mod cues for \"bigger\" writing.    8. Patient will repeat at single syllable level in gross approximation w/ max cues w/ 50% acc across 3 consecutive sessions  *Pt repeats at single syllable level in gross approximation 50% accuracy w/ max cues.    *Further goals may be added pending progress towards this POC     Education: Pt educated on progress during today's session. Pt head nods in agreement.    Thank you-  Leah Aparicio M.A., CCC-SLP          OP SLP Education       01/24/18 1418    Education    Education Comments d/w with pt progress during today's session w/ pt acknowledging via head nod.  -      User Key  (r) = Recorded By, (t) = Taken By, (c) = Cosigned By    Initials Name Effective Dates    SS Leah Aparicio MA,CCC-SLP 12/20/17 -           Time Calculation:   SLP Start Time: 1300  SLP Stop Time: 1330  SLP Time Calculation (min): 30 min  SLP Non-Billable Time (min): 30 min    Therapy Charges for Today     Code Description Service Date Service Provider Modifiers Qty    26416727756  ST TREATMENT SPEECH 2 1/24/2018 Leah Aparicio MA,CCC-SLP 59, GN 1    [id number] HC ST CARE PLAN 15 MIN 1/24/2018 Leah Aparicio MA,CCC-SLP GN 2            Leah Aparicio MA,CCC-SLP  1/24/2018  "

## 2018-01-29 ENCOUNTER — HOSPITAL ENCOUNTER (OUTPATIENT)
Dept: OCCUPATIONAL THERAPY | Facility: HOSPITAL | Age: 73
Setting detail: THERAPIES SERIES
Discharge: HOME | End: 2018-01-29

## 2018-01-29 ENCOUNTER — HOSPITAL ENCOUNTER (OUTPATIENT)
Dept: PHYSICAL THERAPY | Facility: HOSPITAL | Age: 73
Setting detail: THERAPIES SERIES
Discharge: HOME | End: 2018-01-29

## 2018-01-29 ENCOUNTER — HOSPITAL ENCOUNTER (OUTPATIENT)
Dept: SPEECH THERAPY | Facility: HOSPITAL | Age: 73
Setting detail: THERAPIES SERIES
Discharge: HOME | End: 2018-01-29

## 2018-01-29 DIAGNOSIS — I63.9 CEREBROVASCULAR ACCIDENT (CVA), UNSPECIFIED MECHANISM (HCC): Primary | ICD-10-CM

## 2018-01-29 DIAGNOSIS — R26.81 UNSTEADY GAIT: Primary | ICD-10-CM

## 2018-01-29 DIAGNOSIS — G81.91 RIGHT HEMIPARESIS (HCC): Primary | ICD-10-CM

## 2018-01-29 DIAGNOSIS — G81.91 RIGHT HEMIPARESIS (HCC): ICD-10-CM

## 2018-01-29 PROCEDURE — 97530 THERAPEUTIC ACTIVITIES: CPT | Performed by: OCCUPATIONAL THERAPIST

## 2018-01-29 PROCEDURE — 92507 TX SP LANG VOICE COMM INDIV: CPT | Performed by: SPEECH-LANGUAGE PATHOLOGIST

## 2018-01-29 PROCEDURE — 97110 THERAPEUTIC EXERCISES: CPT | Performed by: PHYSICAL THERAPIST

## 2018-01-29 NOTE — PROGRESS NOTES
Outpatient Speech Language Pathology   Adult Speech Language Cognitive Treatment Note  MARIANO Modi     Patient Name: Sunshine Her  : 1945  MRN: [id number]  Today's Date: 2018         Visit Date: 2018   Patient Active Problem List   Diagnosis   • Generalized weakness   • Acute on chronic renal insufficiency   • Anemia   • Symptomatic bradycardia   • Leukopenia   • Hypothyroid   • Bipolar affective disorder   • Kidney function abnormal   • COPD (chronic obstructive pulmonary disease)   • CVA (cerebral vascular accident)-Right Occipital/Temporal   • Chronic kidney disease (CKD), stage III (moderate)   • Dementia   • Vertigo   • History of stroke   • Iron deficiency anemia   • CKD (chronic kidney disease), stage III   • Vascular dementia   • Hypertension   • Hyperlipidemia   • Tobacco abuse   • Malignant hypertension   • Orthostasis   • Impaired functional mobility, balance, gait, and endurance   • Sepsis   • Paroxysmal atrial fibrillation   • Dyslipidemia       Visit Dx:    ICD-10-CM ICD-9-CM   1. Cerebrovascular accident (CVA), unspecified mechanism I63.9 434.91          Long term goals:  1. Patient will improve expressive language skills to allow for maximal functional communication and independence in adls.          2. Patient will improve receptive language skills to allow for maximal functional communication and independence in adls.         Short term goals:  1. Patient will produce rote tasks w/ max cues at 90% across 3 consecutive sessions.   *Pt produces rote tasks w/ max cues at 80% accuracy.   2.  Patient will produce personal identifiers w/ max cues/models 3/5 opp over 3 consecutive sessions.   *Pt produces personal identifiers 2/5 opp mod cues.   3.  Patientwill imitate confrontation naming of simple, common objects w/ max cues and model 80% in gross approximation across 3 consecutive sessions.   *Pt imitates confrontation naming of simple, common objects w/ mod cues and model 75% in  "gross approximation.  4.  Patient will match word to object 80% across 3 consecutive sessions w/ max cues.   *Pt matches word to object 75% this session w/ max cues.  5.  Patient will respond to moderate y/n questions at 80% w/ max cues across 3 consecutive sessions.   Pt responds to moderate y/n questions at 75% w/ max cues.  6.  Patient will follow simple functional directives in adls 2-3 steps, w/max cues across 3 consecutive sessions.   *Pt follows 1 step simple directives w/ mod cues.  7.  Patient will copy/trace her name common words at 80% w/ max cues across 3 consecutive sessions.  Pt copies first and last name with 70% accuracy this date. Mod cues for \"bigger\" writing.    8. Patient will repeat at single syllable level in gross approximation w/ max cues w/ 50% acc across 3 consecutive sessions  *Pt repeats at single syllable level in gross approximation 50% accuracy w/ max cues.    *Further goals may be added pending progress towards this POC     Education: Pt educated on progress during today's session. Pt head nods in agreement.    Thank you-  Leah Aparicio M.A., CCC-SLP          OP SLP Education       01/29/18 2693    Education    Education Comments d/w with pt progress during today's session w/ pt acknowledging via head nod.  -      User Key  (r) = Recorded By, (t) = Taken By, (c) = Cosigned By    Initials Name Effective Dates    SS Leah Aparicio MA,CCC-SLP 12/20/17 -           Time Calculation:   SLP Start Time: 1320  SLP Stop Time: 1400  SLP Time Calculation (min): 40 min  SLP Non-Billable Time (min): 30 min    Therapy Charges for Today     Code Description Service Date Service Provider Modifiers Qty    81706582903  ST TREATMENT SPEECH 3 1/29/2018 Leah Aparicio MA,CCC-SLP 59, GN 1    [id number] HC ST CARE PLAN 15 MIN 1/29/2018 Leah Aparicio MA,CCC-SLP GN 2            Leah Aparicio MA,CCC-SLP  1/29/2018  "

## 2018-01-29 NOTE — THERAPY TREATMENT NOTE
"    Outpatient Physical Therapy Neuro Treatment Note   Rian     Patient Name: Sunshine Her  : 1945  MRN: [id number]  Today's Date: 2018      Visit Date: 2018    Visit Dx:    ICD-10-CM ICD-9-CM   1. Unsteady gait R26.81 781.2   2. Right hemiparesis G81.91 342.90       Patient Active Problem List   Diagnosis   • Generalized weakness   • Acute on chronic renal insufficiency   • Anemia   • Symptomatic bradycardia   • Leukopenia   • Hypothyroid   • Bipolar affective disorder   • Kidney function abnormal   • COPD (chronic obstructive pulmonary disease)   • CVA (cerebral vascular accident)-Right Occipital/Temporal   • Chronic kidney disease (CKD), stage III (moderate)   • Dementia   • Vertigo   • History of stroke   • Iron deficiency anemia   • CKD (chronic kidney disease), stage III   • Vascular dementia   • Hypertension   • Hyperlipidemia   • Tobacco abuse   • Malignant hypertension   • Orthostasis   • Impaired functional mobility, balance, gait, and endurance   • Sepsis   • Paroxysmal atrial fibrillation   • Dyslipidemia               PT Ortho       18 1600    Subjective Comments    Subjective Comments Patient asked if she was having pain, and she shook her head \"no.\"  -BE    Subjective Pain    Able to rate subjective pain? yes  -BE    Pre-Treatment Pain Level 0  -BE    Post-Treatment Pain Level 0  -BE      User Key  (r) = Recorded By, (t) = Taken By, (c) = Cosigned By    Initials Name Provider Type    BE Wanda Winslow, PT Physical Therapist                          PT Assessment/Plan       18 5782       PT Assessment    Assessment Comments Patient tolerated today's session, but fatigued quickly.  Rest breaks provided as necesary during session.  Ther ex focused on LE strengthening and transfers; ther ex performed in seated and supine positions.  Patient required min assist for transfers today.  She will continue to be progressed per her tolerance and POC.  -BE     PT Plan    PT " "Plan Comments Progress per patient's tolerance and POC.  -BE       User Key  (r) = Recorded By, (t) = Taken By, (c) = Cosigned By    Initials Name Provider Type    ERLINDA Winslow PT Physical Therapist                     Exercises       01/29/18 1600          Subjective Comments    Subjective Comments Patient asked if she was having pain, and she shook her head \"no.\"  -BE      Subjective Pain    Able to rate subjective pain? yes  -BE      Pre-Treatment Pain Level 0  -BE      Post-Treatment Pain Level 0  -BE      Exercise 1    Exercise Name 1 seated march 10x2, LAQ 10x2, hip abd with GTB 10x2, ball squeeze 10x2, sit to stand x5, knee flexion with GTB 2x10, QS 2x10, SAQ 2x10, SLR 2x10, GS 2x10  -BE      Cueing 1 Verbal;Tactile;Demo  -BE      Time (Minutes) 1 40 min  -BE        User Key  (r) = Recorded By, (t) = Taken By, (c) = Cosigned By    Initials Name Provider Type    ERLINDA Winslow PT Physical Therapist                            Therapy Education  Given: HEP, Symptoms/condition management, Pain management, Mobility training, Fall prevention and home safety  Program: Reinforced  How Provided: Verbal, Demonstration  Provided to: Patient  Level of Understanding: Verbalized, Demonstrated              Time Calculation:   Start Time: 1455  Stop Time: 1542  Time Calculation (min): 47 min     Therapy Charges for Today     Code Description Service Date Service Provider Modifiers Qty    [id number]  PT THER PROC EA 15 MIN 1/29/2018 Wanda Winslow, PT 59, GP 3                    Wanda Winslow, PT  1/29/2018     "

## 2018-01-29 NOTE — PROGRESS NOTES
Outpatient Occupational Therapy Neuro Treatment Note  MARIANO Modi     Patient Name: Sunshine Her  : 1945  MRN: [id number]  Today's Date: 2018       Visit Date: 2018    Patient Active Problem List   Diagnosis   • Generalized weakness   • Acute on chronic renal insufficiency   • Anemia   • Symptomatic bradycardia   • Leukopenia   • Hypothyroid   • Bipolar affective disorder   • Kidney function abnormal   • COPD (chronic obstructive pulmonary disease)   • CVA (cerebral vascular accident)-Right Occipital/Temporal   • Chronic kidney disease (CKD), stage III (moderate)   • Dementia   • Vertigo   • History of stroke   • Iron deficiency anemia   • CKD (chronic kidney disease), stage III   • Vascular dementia   • Hypertension   • Hyperlipidemia   • Tobacco abuse   • Malignant hypertension   • Orthostasis   • Impaired functional mobility, balance, gait, and endurance   • Sepsis   • Paroxysmal atrial fibrillation   • Dyslipidemia        Past Medical History:   Diagnosis Date   • A-fib    • Acute on chronic renal insufficiency 7/15/2016   • Anemia    • Anxiety    • Arthritis    • Arthritis    • Bipolar 1 disorder    • Bradycardia    • COPD (chronic obstructive pulmonary disease)    • Coronary artery disease    • Dementia    • Disease of thyroid gland    • Generalized weakness 2016   • GERD (gastroesophageal reflux disease)    • Heart disease    • History of transfusion    • Hyperlipidemia    • Hypertension    • PAD (peripheral artery disease)    • Stroke     2016, 2017, 2017   • TIA (transient ischemic attack)    • Vertigo         Past Surgical History:   Procedure Laterality Date   • ANGIOPLASTY SUBCLAVIAN ARTERY     • CAROTID ENDARTERECTOMY Bilateral    • COLONOSCOPY     • COLONOSCOPY N/A 2017    Procedure: COLONOSCOPY  CPTCODE:[id number];  Surgeon: Suraj Quintanilla III, MD;  Location: [facility];  Service:    • ENDOSCOPY     • ENDOSCOPY N/A 2017    Procedure: ESOPHAGOGASTRODUODENOSCOPY  WITH BIOPSY  CPTCODE:[id number];  Surgeon: Suraj Quintanilla III, MD;  Location: [facility] OR;  Service:    • ENDOSCOPY W/ PEG TUBE PLACEMENT N/A 6/28/2017    Procedure: ESOPHAGOGASTRODUODENOSCOPY WITH PERCUTANEOUS ENDOSCOPIC GASTROSTOMY TUBE INSERTION;  Surgeon: Renan Montanez MD;  Location: [facility] OR;  Service:    • EYE SURGERY      bilat cataracts   • RENAL ARTERY BYPASS           Visit Dx:    ICD-10-CM ICD-9-CM   1. Right hemiparesis G81.91 342.90                         OT Assessment/Plan       01/29/18 1547       OT Assessment    Assessment Comments Sunshine continues to require mod cues to complete task. She continues to demonstrate some perseveration and difficutly switching tasks easily.    -     OT Plan    OT Plan Comments continue OT  -       User Key  (r) = Recorded By, (t) = Taken By, (c) = Cosigned By    Initials Name Provider Type     Dana Ludwig OT Occupational Therapist                             OT Exercises       01/29/18 1500          Exercise 1    Exercise Name 1 PROM right UE and right hand  -AH      Exercise 2    Exercise Name 2 overhead pulleys  3 min x3 with assist of flexion glove  -AH      Exercise 3    Exercise Name 3 table top dusting right UE with arm skate  -AH      Exercise 4    Exercise Name 4 handgripper right hand  -AH        User Key  (r) = Recorded By, (t) = Taken By, (c) = Cosigned By    Initials Name Provider Type     Dana Ludwig OT Occupational Therapist                        Time Calculation:   OT Start Time: 1400  OT Stop Time: 1445  OT Time Calculation (min): 45 min     Therapy Charges for Today     Code Description Service Date Service Provider Modifiers Qty    99252015590  OT THERAPEUTIC ACT EA 15 MIN 1/29/2018 Dana Ludwig OT GO 3                    Dana Ludwig OT  1/29/2018

## 2018-01-31 ENCOUNTER — HOSPITAL ENCOUNTER (OUTPATIENT)
Dept: PHYSICAL THERAPY | Facility: HOSPITAL | Age: 73
Setting detail: THERAPIES SERIES
Discharge: HOME | End: 2018-01-31

## 2018-01-31 ENCOUNTER — HOSPITAL ENCOUNTER (OUTPATIENT)
Dept: SPEECH THERAPY | Facility: HOSPITAL | Age: 73
Setting detail: THERAPIES SERIES
Discharge: HOME | End: 2018-01-31

## 2018-01-31 ENCOUNTER — HOSPITAL ENCOUNTER (OUTPATIENT)
Dept: OCCUPATIONAL THERAPY | Facility: HOSPITAL | Age: 73
Setting detail: THERAPIES SERIES
Discharge: HOME | End: 2018-01-31

## 2018-01-31 DIAGNOSIS — G81.91 RIGHT HEMIPARESIS (HCC): Primary | ICD-10-CM

## 2018-01-31 DIAGNOSIS — G81.91 RIGHT HEMIPARESIS (HCC): ICD-10-CM

## 2018-01-31 DIAGNOSIS — I63.9 CEREBROVASCULAR ACCIDENT (CVA), UNSPECIFIED MECHANISM (HCC): Primary | ICD-10-CM

## 2018-01-31 DIAGNOSIS — R26.81 UNSTEADY GAIT: Primary | ICD-10-CM

## 2018-01-31 PROCEDURE — 97112 NEUROMUSCULAR REEDUCATION: CPT | Performed by: PHYSICAL THERAPIST

## 2018-01-31 PROCEDURE — 92507 TX SP LANG VOICE COMM INDIV: CPT | Performed by: SPEECH-LANGUAGE PATHOLOGIST

## 2018-01-31 PROCEDURE — 97530 THERAPEUTIC ACTIVITIES: CPT | Performed by: OCCUPATIONAL THERAPIST

## 2018-01-31 PROCEDURE — 97110 THERAPEUTIC EXERCISES: CPT | Performed by: PHYSICAL THERAPIST

## 2018-02-05 ENCOUNTER — HOSPITAL ENCOUNTER (OUTPATIENT)
Dept: OCCUPATIONAL THERAPY | Facility: HOSPITAL | Age: 73
Setting detail: THERAPIES SERIES
Discharge: HOME | End: 2018-02-05

## 2018-02-05 ENCOUNTER — HOSPITAL ENCOUNTER (OUTPATIENT)
Dept: PHYSICAL THERAPY | Facility: HOSPITAL | Age: 73
Setting detail: THERAPIES SERIES
Discharge: HOME | End: 2018-02-05

## 2018-02-05 ENCOUNTER — HOSPITAL ENCOUNTER (OUTPATIENT)
Dept: SPEECH THERAPY | Facility: HOSPITAL | Age: 73
Setting detail: THERAPIES SERIES
Discharge: HOME | End: 2018-02-05

## 2018-02-05 DIAGNOSIS — G81.91 RIGHT HEMIPARESIS (HCC): ICD-10-CM

## 2018-02-05 DIAGNOSIS — G81.91 RIGHT HEMIPARESIS (HCC): Primary | ICD-10-CM

## 2018-02-05 DIAGNOSIS — I63.9 CEREBROVASCULAR ACCIDENT (CVA), UNSPECIFIED MECHANISM (HCC): Primary | ICD-10-CM

## 2018-02-05 DIAGNOSIS — R26.81 UNSTEADY GAIT: Primary | ICD-10-CM

## 2018-02-05 PROCEDURE — 97110 THERAPEUTIC EXERCISES: CPT

## 2018-02-05 PROCEDURE — 97112 NEUROMUSCULAR REEDUCATION: CPT

## 2018-02-05 PROCEDURE — 92507 TX SP LANG VOICE COMM INDIV: CPT | Performed by: SPEECH-LANGUAGE PATHOLOGIST

## 2018-02-05 PROCEDURE — 97530 THERAPEUTIC ACTIVITIES: CPT | Performed by: OCCUPATIONAL THERAPIST

## 2018-02-05 NOTE — PROGRESS NOTES
Outpatient Speech Language Pathology   Adult Speech Language Cognitive Treatment Note  MARIANO Modi     Patient Name: Sunshine Her  : 1945  MRN: [id number]  Today's Date: 2018         Visit Date: 2018   Patient Active Problem List   Diagnosis   • Generalized weakness   • Acute on chronic renal insufficiency   • Anemia   • Symptomatic bradycardia   • Leukopenia   • Hypothyroid   • Bipolar affective disorder   • Kidney function abnormal   • COPD (chronic obstructive pulmonary disease)   • CVA (cerebral vascular accident)-Right Occipital/Temporal   • Chronic kidney disease (CKD), stage III (moderate)   • Dementia   • Vertigo   • History of stroke   • Iron deficiency anemia   • CKD (chronic kidney disease), stage III   • Vascular dementia   • Hypertension   • Hyperlipidemia   • Tobacco abuse   • Malignant hypertension   • Orthostasis   • Impaired functional mobility, balance, gait, and endurance   • Sepsis   • Paroxysmal atrial fibrillation   • Dyslipidemia       Visit Dx:    ICD-10-CM ICD-9-CM   1. Cerebrovascular accident (CVA), unspecified mechanism I63.9 434.91          Long term goals:  1. Patient will improve expressive language skills to allow for maximal functional communication and independence in adls.          *pt progressing as expected.  2. Patient will improve receptive language skills to allow for maximal functional communication and independence in adls.   *pt progressing as expected.      Short term goals:  1. Patient will produce rote tasks w/ max cues at 90% across 3 consecutive sessions.   *Pt produces rote tasks w/ max cues at 85% accuracy.     2.  Patient will produce personal identifiers w/ max cues/models 3/5 opp over 3 consecutive sessions.   *Pt produces personal identifiers 3/5 opp mod cues.     3.  Patientwill imitate confrontation naming of simple, common objects w/ max cues and model 80% in gross approximation across 3 consecutive sessions.   *Pt imitates confrontation  naming of simple, common objects w/ mod cues and model 75% in gross approximation.    4.  Patient will match word to object 80% across 3 consecutive sessions w/ max cues.   *Pt matches word to object 70% this session w/ max cues.    5.  Patient will respond to moderate y/n questions at 80% w/ max cues across 3 consecutive sessions.   Pt responds to moderate y/n questions at 75% w/ max cues.    6.  Patient will follow simple functional directives in adls 2-3 steps, w/max cues across 3 consecutive sessions.   *Pt follows 1 step simple directives w/ min-mod cues.    7.  Patient will copy/trace her name common words at 80% w/ max cues across 3 consecutive sessions.  *pt copies name, common one-syllable words w/ 60% accuracy and mod cues.      8. Patient will repeat at single syllable level in gross approximation w/ max cues w/ 50% acc across 3 consecutive sessions  *Pt repeats at single syllable level in gross approximation 50% accuracy w/ max cues.    *Further goals may be added pending progress towards this POC     Education: Pt educated on progress during today's session. Pt head nods in agreement.    Thank you-  Leah Aparicio M.A., CCC-SLP          OP SLP Education       02/05/18 1446    Education    Education Comments d/w with pt progress during today's session w/ pt acknowledging via head nod  -SS      User Key  (r) = Recorded By, (t) = Taken By, (c) = Cosigned By    Initials Name Effective Dates    SS Leah Aparicio MA,CCC-SLP 12/20/17 -           Time Calculation:   SLP Start Time: 1315  SLP Stop Time: 1400  SLP Time Calculation (min): 45 min  SLP Non-Billable Time (min): 30 min    Therapy Charges for Today     Code Description Service Date Service Provider Modifiers Qty    [id number]  ST TREATMENT SPEECH 3 2/5/2018 Leah Aparicio MA,CCC-SLP 59, GN 1    [id number] HC ST CARE PLAN 15 MIN 2/5/2018 Leah Aparicio MA,CCC-SLP GN 2            Leah Aparicio MA,CCC-SLP  2/5/2018

## 2018-02-05 NOTE — THERAPY TREATMENT NOTE
"    Outpatient Physical Therapy Neuro Treatment Note   [place]     Patient Name: Sunshine Her  : 1945  MRN: [id number]  Today's Date: 2018      Visit Date: 2018    Visit Dx:    ICD-10-CM ICD-9-CM   1. Unsteady gait R26.81 781.2   2. Right hemiparesis G81.91 342.90       Patient Active Problem List   Diagnosis   • Generalized weakness   • Acute on chronic renal insufficiency   • Anemia   • Symptomatic bradycardia   • Leukopenia   • Hypothyroid   • Bipolar affective disorder   • Kidney function abnormal   • COPD (chronic obstructive pulmonary disease)   • CVA (cerebral vascular accident)-Right Occipital/Temporal   • Chronic kidney disease (CKD), stage III (moderate)   • Dementia   • Vertigo   • History of stroke   • Iron deficiency anemia   • CKD (chronic kidney disease), stage III   • Vascular dementia   • Hypertension   • Hyperlipidemia   • Tobacco abuse   • Malignant hypertension   • Orthostasis   • Impaired functional mobility, balance, gait, and endurance   • Sepsis   • Paroxysmal atrial fibrillation   • Dyslipidemia                 PT Neuro       18 1600          Subjective Comments    Subjective Comments Patient shook her head \"no\" when asked if she was in pain.   -AC      Subjective Pain    Able to rate subjective pain? --  -AC        User Key  (r) = Recorded By, (t) = Taken By, (c) = Cosigned By    Initials Name Provider Type    BARBARA Lira, PTA Physical Therapy Assistant                        PT Assessment/Plan       18 1604       PT Assessment    Assessment Comments New ther ex added per the patient's tolerance, patient demonstrated and understood new ther ex with no increase in pain noted. Patient tolerated treatment session well with rest breaks needed throughout treatment session. Verbal cues needed on proper technique of exercises. Blood pressure: 138/72mmHg, o2: 92% taken prior to standing activities. Min A/CGA needed when performing standing actvities and " "balance. No adverse reactions with treatment session. Patient demonstrated difficulty when performing sit to stand exercise.   -AC     PT Plan    PT Plan Comments Continue per PT's POC, progress per the patient's tolerance.  -AC       User Key  (r) = Recorded By, (t) = Taken By, (c) = Cosigned By    Initials Name Provider Type    BARBARA Lira PTA Physical Therapy Assistant                     Exercises       02/05/18 1600          Subjective Comments    Subjective Comments Patient shook her head \"no\" when asked if she was in pain.   -AC      Subjective Pain    Able to rate subjective pain? --  -AC      Exercise 1    Exercise Name 1 ball squeeze 10x2, LAQ 10x2, seated march 10x2, knee flex with GTB 10x2, hip abd with GTB 10x2, QS 10x2, SLR 10x2, BKFO 10x2, GS 10x2, sit to stand x5  -AC      Cueing 1 Verbal;Tactile  -AC      Time (Minutes) 1 30 minutes  -AC      Exercise 2    Exercise Name 2 Neuro: alt foot placement on 6inch step x10, step up on foam x5, feet side by side on foam balance  -AC      Cueing 2 Tactile;Verbal;Demo  -AC      Time (Minutes) 2 15 minutes  -AC        User Key  (r) = Recorded By, (t) = Taken By, (c) = Cosigned By    Initials Name Provider Type    BARBARA Lira PTA Physical Therapy Assistant                            Therapy Education  Given: HEP, Symptoms/condition management, Posture/body mechanics  Program: New  How Provided: Verbal, Demonstration  Provided to: Patient  Level of Understanding: Verbalized, Demonstrated              Time Calculation:   Start Time: 1455  Stop Time: 1550  Time Calculation (min): 55 min     Therapy Charges for Today     Code Description Service Date Service Provider Modifiers Qty    87162378651  PT THER PROC EA 15 MIN 2/5/2018 Celina Lira PTA 59, GP 2    [id number]  PT NEUROMUSC RE EDUCATION EA 15 MIN 2/5/2018 Celina Lira PTA 59, GP 1    [id number]  PT THER SUPP EA 15 MIN 2/5/2018 Celina Lira PTA " GP 1                    Celina Lira, PTA  2/5/2018

## 2018-02-05 NOTE — PROGRESS NOTES
Outpatient Occupational Therapy Neuro Treatment Note  [facility]     Patient Name: Sunshine Her  : 1945  MRN: [id number]  Today's Date: 2018       Visit Date: 2018    Patient Active Problem List   Diagnosis   • Generalized weakness   • Acute on chronic renal insufficiency   • Anemia   • Symptomatic bradycardia   • Leukopenia   • Hypothyroid   • Bipolar affective disorder   • Kidney function abnormal   • COPD (chronic obstructive pulmonary disease)   • CVA (cerebral vascular accident)-Right Occipital/Temporal   • Chronic kidney disease (CKD), stage III (moderate)   • Dementia   • Vertigo   • History of stroke   • Iron deficiency anemia   • CKD (chronic kidney disease), stage III   • Vascular dementia   • Hypertension   • Hyperlipidemia   • Tobacco abuse   • Malignant hypertension   • Orthostasis   • Impaired functional mobility, balance, gait, and endurance   • Sepsis   • Paroxysmal atrial fibrillation   • Dyslipidemia        Past Medical History:   Diagnosis Date   • A-fib    • Acute on chronic renal insufficiency 7/15/2016   • Anemia    • Anxiety    • Arthritis    • Arthritis    • Bipolar 1 disorder    • Bradycardia    • COPD (chronic obstructive pulmonary disease)    • Coronary artery disease    • Dementia    • Disease of thyroid gland    • Generalized weakness 2016   • GERD (gastroesophageal reflux disease)    • Heart disease    • History of transfusion    • Hyperlipidemia    • Hypertension    • PAD (peripheral artery disease)    • Stroke     2016, 2017, 2017   • TIA (transient ischemic attack)    • Vertigo         Past Surgical History:   Procedure Laterality Date   • ANGIOPLASTY SUBCLAVIAN ARTERY     • CAROTID ENDARTERECTOMY Bilateral    • COLONOSCOPY     • COLONOSCOPY N/A 2017    Procedure: COLONOSCOPY  CPTCODE:20286;  Surgeon: Suraj Quintanilla III, MD;  Location: [facility];  Service:    • ENDOSCOPY     • ENDOSCOPY N/A 2017    Procedure: ESOPHAGOGASTRODUODENOSCOPY  WITH BIOPSY  CPTCODE:31224;  Surgeon: Suraj Quintanilla III, MD;  Location: [facility] OR;  Service:    • ENDOSCOPY W/ PEG TUBE PLACEMENT N/A 6/28/2017    Procedure: ESOPHAGOGASTRODUODENOSCOPY WITH PERCUTANEOUS ENDOSCOPIC GASTROSTOMY TUBE INSERTION;  Surgeon: Renan Montanez MD;  Location: [facility];  Service:    • EYE SURGERY      bilat cataracts   • RENAL ARTERY BYPASS           Visit Dx:    ICD-10-CM ICD-9-CM   1. Right hemiparesis G81.91 342.90                         OT Assessment/Plan       02/05/18 1601       OT Assessment    Assessment Comments Sunshine tolerated therapy well.  She continues to require cues to complete activities and continues to demonstrate some perseveration with activities.  -AH     OT Plan    OT Plan Comments continue OT  -       User Key  (r) = Recorded By, (t) = Taken By, (c) = Cosigned By    Initials Name Provider Type     Dana Ludwig OT Occupational Therapist                             OT Exercises       02/05/18 1600          Exercise 1    Exercise Name 1 PROM right UE  -AH      Exercise 2    Exercise Name 2 overhead pulleys  -AH      Exercise 3    Exercise Name 3 table top dusing right UE  -AH      Exercise 4    Exercise Name 4 handgripper right hand  -AH        User Key  (r) = Recorded By, (t) = Taken By, (c) = Cosigned By    Initials Name Provider Type     Dana Ludwig OT Occupational Therapist                        Time Calculation:   OT Start Time: 1400  OT Stop Time: 1455  OT Time Calculation (min): 55 min     Therapy Charges for Today     Code Description Service Date Service Provider Modifiers Qty    85587122519  OT THERAPEUTIC ACT EA 15 MIN 2/5/2018 Dana Ludwig OT GO 4                    Dana Ludwig OT  2/5/2018

## 2018-02-07 ENCOUNTER — HOSPITAL ENCOUNTER (OUTPATIENT)
Dept: PHYSICAL THERAPY | Facility: HOSPITAL | Age: 73
Setting detail: THERAPIES SERIES
Discharge: HOME | End: 2018-02-07

## 2018-02-07 ENCOUNTER — HOSPITAL ENCOUNTER (OUTPATIENT)
Dept: OCCUPATIONAL THERAPY | Facility: HOSPITAL | Age: 73
Setting detail: THERAPIES SERIES
Discharge: HOME | End: 2018-02-07

## 2018-02-07 ENCOUNTER — HOSPITAL ENCOUNTER (OUTPATIENT)
Dept: SPEECH THERAPY | Facility: HOSPITAL | Age: 73
Setting detail: THERAPIES SERIES
Discharge: HOME | End: 2018-02-07

## 2018-02-07 DIAGNOSIS — I63.9 CEREBROVASCULAR ACCIDENT (CVA), UNSPECIFIED MECHANISM (HCC): Primary | ICD-10-CM

## 2018-02-07 DIAGNOSIS — G81.91 RIGHT HEMIPARESIS (HCC): Primary | ICD-10-CM

## 2018-02-07 DIAGNOSIS — G81.91 RIGHT HEMIPARESIS (HCC): ICD-10-CM

## 2018-02-07 DIAGNOSIS — R26.81 UNSTEADY GAIT: Primary | ICD-10-CM

## 2018-02-07 PROCEDURE — 92507 TX SP LANG VOICE COMM INDIV: CPT | Performed by: SPEECH-LANGUAGE PATHOLOGIST

## 2018-02-07 PROCEDURE — G8978 MOBILITY CURRENT STATUS: HCPCS | Performed by: PHYSICAL THERAPIST

## 2018-02-07 PROCEDURE — 97112 NEUROMUSCULAR REEDUCATION: CPT | Performed by: PHYSICAL THERAPIST

## 2018-02-07 PROCEDURE — G9163 LANG EXPRESS GOAL STATUS: HCPCS

## 2018-02-07 PROCEDURE — G8979 MOBILITY GOAL STATUS: HCPCS | Performed by: PHYSICAL THERAPIST

## 2018-02-07 PROCEDURE — G9162 LANG EXPRESS CURRENT STATUS: HCPCS

## 2018-02-07 PROCEDURE — G8984 CARRY CURRENT STATUS: HCPCS | Performed by: OCCUPATIONAL THERAPIST

## 2018-02-07 PROCEDURE — 97110 THERAPEUTIC EXERCISES: CPT | Performed by: PHYSICAL THERAPIST

## 2018-02-07 PROCEDURE — G8985 CARRY GOAL STATUS: HCPCS | Performed by: OCCUPATIONAL THERAPIST

## 2018-02-07 NOTE — PROGRESS NOTES
Outpatient Speech Language Pathology   Adult Speech Language Cognitive Treatment Note  [facility]     Patient Name: Sunshine Her  : 1945  MRN: [id number]  Today's Date: 2018         Visit Date: 2018   Patient Active Problem List   Diagnosis   • Generalized weakness   • Acute on chronic renal insufficiency   • Anemia   • Symptomatic bradycardia   • Leukopenia   • Hypothyroid   • Bipolar affective disorder   • Kidney function abnormal   • COPD (chronic obstructive pulmonary disease)   • CVA (cerebral vascular accident)-Right Occipital/Temporal   • Chronic kidney disease (CKD), stage III (moderate)   • Dementia   • Vertigo   • History of stroke   • Iron deficiency anemia   • CKD (chronic kidney disease), stage III   • Vascular dementia   • Hypertension   • Hyperlipidemia   • Tobacco abuse   • Malignant hypertension   • Orthostasis   • Impaired functional mobility, balance, gait, and endurance   • Sepsis   • Paroxysmal atrial fibrillation   • Dyslipidemia       Visit Dx:    ICD-10-CM ICD-9-CM   1. Cerebrovascular accident (CVA), unspecified mechanism I63.9 434.91          Long term goals:  1. Patient will improve expressive language skills to allow for maximal functional communication and independence in adls.          *pt progressing as expected.  2. Patient will improve receptive language skills to allow for maximal functional communication and independence in adls.   *pt progressing as expected.      Short term goals:  1. Patient will produce rote tasks w/ max cues at 90% across 3 consecutive sessions.   *Pt produces rote tasks w/ max cues at 85% accuracy.     2.  Patient will produce personal identifiers w/ max cues/models 3/5 opp over 3 consecutive sessions.   *Pt produces personal identifiers 3/5 opp mod cues.     3.  Patientwill imitate confrontation naming of simple, common objects w/ max cues and model 80% in gross approximation across 3 consecutive sessions.   *Pt imitates confrontation  naming of simple, common objects w/ mod cues and model 80% in gross approximation.    4.  Patient will match word to object 80% across 3 consecutive sessions w/ max cues.   *Pt matches word to object 75% this session w/ max cues.    5.  Patient will respond to moderate y/n questions at 80% w/ max cues across 3 consecutive sessions.   Pt responds to moderate y/n questions at 75% w/ max cues.    6.  Patient will follow simple functional directives in adls 2-3 steps, w/max cues across 3 consecutive sessions.   *Pt follows 1 & 2 step simple directives w/ min-mod cues.    7.  Patient will copy/trace her name common words at 80% w/ max cues across 3 consecutive sessions.  *pt copies name, common one-syllable words w/ 60% accuracy and mod cues.      8. Patient will repeat at single syllable level in gross approximation w/ max cues w/ 50% acc across 3 consecutive sessions  *Pt repeats at single syllable level in gross approximation 60% accuracy w/ max cues.    *Further goals may be added pending progress towards this POC     Education: Pt educated on progress during today's session. Pt head nods in agreement.    Thank you-  Leah Aparicio M.A., CCC-SLP          OP SLP Education       02/07/18 1538    Education    Education Comments d/w with pt progress during today's session w/ pt demonstrating understanding.  -      User Key  (r) = Recorded By, (t) = Taken By, (c) = Cosigned By    Initials Name Effective Dates    SS Leah Aparicio MA,CCC-SLP 12/20/17 -           Time Calculation:   SLP Start Time: 1330  SLP Stop Time: 1400  SLP Time Calculation (min): 30 min  SLP Non-Billable Time (min): 30 min    Therapy Charges for Today     Code Description Service Date Service Provider Modifiers Qty    19611859177  ST TREATMENT SPEECH 2 2/7/2018 Leah Aparicio MA,CCC-SLP 59, GN 1    75134699330 HC ST CARE PLAN 15 MIN 2/7/2018 Leah Aparicio MA,CCC-SLP GN 2            Leah Aparicio MA,CCC-SLP  2/7/2018

## 2018-02-07 NOTE — THERAPY RE-EVALUATION
.Outpatient Physical Therapy Neuro Re-Evaluation  [facility]     Patient Name: Sunshine Her  : 1945  MRN: [id number]  Today's Date: 2018      Visit Date: 2018    Patient Active Problem List   Diagnosis   • Generalized weakness   • Acute on chronic renal insufficiency   • Anemia   • Symptomatic bradycardia   • Leukopenia   • Hypothyroid   • Bipolar affective disorder   • Kidney function abnormal   • COPD (chronic obstructive pulmonary disease)   • CVA (cerebral vascular accident)-Right Occipital/Temporal   • Chronic kidney disease (CKD), stage III (moderate)   • Dementia   • Vertigo   • History of stroke   • Iron deficiency anemia   • CKD (chronic kidney disease), stage III   • Vascular dementia   • Hypertension   • Hyperlipidemia   • Tobacco abuse   • Malignant hypertension   • Orthostasis   • Impaired functional mobility, balance, gait, and endurance   • Sepsis   • Paroxysmal atrial fibrillation   • Dyslipidemia        Past Medical History:   Diagnosis Date   • A-fib    • Acute on chronic renal insufficiency 7/15/2016   • Anemia    • Anxiety    • Arthritis    • Arthritis    • Bipolar 1 disorder    • Bradycardia    • COPD (chronic obstructive pulmonary disease)    • Coronary artery disease    • Dementia    • Disease of thyroid gland    • Generalized weakness 2016   • GERD (gastroesophageal reflux disease)    • Heart disease    • History of transfusion    • Hyperlipidemia    • Hypertension    • PAD (peripheral artery disease)    • Stroke     2016, 2017, 2017   • TIA (transient ischemic attack)    • Vertigo         Past Surgical History:   Procedure Laterality Date   • ANGIOPLASTY SUBCLAVIAN ARTERY     • CAROTID ENDARTERECTOMY Bilateral    • COLONOSCOPY     • COLONOSCOPY N/A 2017    Procedure: COLONOSCOPY  CPTCODE:78334;  Surgeon: Suraj Quintanilla III, MD;  Location: [facility];  Service:    • ENDOSCOPY     • ENDOSCOPY N/A 2017    Procedure: ESOPHAGOGASTRODUODENOSCOPY  WITH BIOPSY  CPTCODE:05852;  Surgeon: Suraj Quintanilla III, MD;  Location: [facility] OR;  Service:    • ENDOSCOPY W/ PEG TUBE PLACEMENT N/A 6/28/2017    Procedure: ESOPHAGOGASTRODUODENOSCOPY WITH PERCUTANEOUS ENDOSCOPIC GASTROSTOMY TUBE INSERTION;  Surgeon: Renan Montanez MD;  Location: [facility] OR;  Service:    • EYE SURGERY      bilat cataracts   • RENAL ARTERY BYPASS           Visit Dx:     ICD-10-CM ICD-9-CM   1. Unsteady gait R26.81 781.2   2. Right hemiparesis G81.91 342.90                     PT Neuro       02/07/18 1500          Subjective Comments    Subjective Comments Pt reported no pain prior to today's treatment session. She was able to verbalize the absence of pain with extra time.  -AD      Subjective Pain    Able to rate subjective pain? yes  -AD      Pre-Treatment Pain Level 0  -AD      Post-Treatment Pain Level 0  -AD      DTR- Lower Quarter Clearing    Patellar tendon (L2-4) Left:;3- Slightly hyperactive response  -AD      Achilles tendon (S1-2) Bilateral:;2- Normal response  -AD      Lower Extremity    Lower Ext Manual Muscle Testing --   LLE 4/5, RLE 3+/5  -AD      Bed Mobility, Assessment/Treatment    Bed Mobility, Roll Left, [place] minimum assist (75% patient effort)  -AD      Bed Mobility, Roll Right, [place] minimum assist (75% patient effort)  -AD      Bed Mobility, Scoot/Bridge, [place] minimum assist (75% patient effort)  -AD      Bed Mob, Supine to Sit, [place] minimum assist (75% patient effort)  -AD      Bed Mob, Sit to Supine, [place] minimum assist (75% patient effort)  -AD      Bed Mob, Sidelying to Sit, [place] minimum assist (75% patient effort)  -AD      Bed Mob, Sit to Sidelying, [place] minimum assist (75% patient effort)  -AD      Bed Mobility, Safety Issues cognitive deficits limit understanding;decreased use of arms for pushing/pulling;decreased use of legs for bridging/pushing;impaired trunk control for bed mobility  -AD      Bed  Mobility, Impairments ROM decreased;strength decreased;impaired balance;coordination impaired;motor control impaired  -AD      Transfers    Transfers, Bed-Chair [place] contact guard assist;minimum assist (75% patient effort)  -AD      Transfers, Chair-Bed [place] contact guard assist;minimum assist (75% patient effort)  -AD      Transfers, Sit-Stand [place] contact guard assist  -AD      Transfers, Stand-Sit [place] contact guard assist  -AD      Transfers, Sit-Stand-Sit, Assist Device --   Arm rests, parallel bars  -AD      Gait Assessment/Treatment    Gait, Comment Unsteady gait, decreased right stance phase, flexed forward posture.  -AD      Balance Skills Training    Standing-Level of Assistance Contact guard;Minimum assistance  -AD      Gait Balance-Level of Assistance Contact guard;Minimum assistance  -AD        User Key  (r) = Recorded By, (t) = Taken By, (c) = Cosigned By    Initials Name Provider Type    AD Ashley Claudene Dalton, PT Physical Therapist                  Therapy Education  Given: HEP, Symptoms/condition management, Posture/body mechanics  Program: Reinforced  How Provided: Verbal, Demonstration  Provided to: Patient  Level of Understanding: Verbalized, Demonstrated          PT OP Goals       02/07/18 1500       PT Short Term Goals    STG Date to Achieve 02/21/18  -AD     STG 1 Pt and caregiver will be instructed in HEP.  -AD     STG 1 Progress Met  -AD     STG 2 Pt and family will be provided education on home safety and fall prevention.  -AD     STG 2 Progress Met  -AD     STG 3 Pt will score improve 4 points on the Tinetti for improved mobility.  -AD     STG 3 Progress Ongoing  -AD     STG 3 Progress Comments Pt progressed by two points  -AD     Long Term Goals    LTG Date to Achieve 03/09/18  -AD     LTG 1 Pt will perform SLS for 5 seconds for improved balance and stability.  -AD     LTG 1 Progress Not Met  -AD     LTG 1 Progress Comments Unable to perform  -AD     LTG  2 Pt will perform tandem stance for 20 seconds for improved balance and stability.  -AD     LTG 2 Progress Not Met  -AD     LTG 2 Progress Comments Unable to perform  -AD     LTG 3 Pt will ambulate 100' with equal weight shifting and toe clearance, using appropriate assistive device, for improved mobility and safety.  -AD     LTG 3 Progress Progressing  -AD     LTG 4 Pt will score 15/28 on the Tinetti for improved gait, balance, and safety.  -AD     LTG 4 Progress Progressing  -AD     LTG 4 Progress Comments 11/28  -AD     Time Calculation    PT Goal Re-Cert Due Date 03/09/18  -AD       User Key  (r) = Recorded By, (t) = Taken By, (c) = Cosigned By    Initials Name Provider Type    AD Ashley Claudene Dalton, PT Physical Therapist                PT Assessment/Plan       02/07/18 1529       PT Assessment    Functional Limitations Decreased safety during functional activities;Impaired gait;Limitation in home management;Limitations in functional capacity and performance;Performance in work activities  -AD     Impairments Balance;Coordination;Gait;Endurance;Motor function;Muscle strength;Poor body mechanics;Posture;Range of motion  -AD     Assessment Comments The patient has achieved 2/7 established physical therapy goals. Therapeutic exercises have addressed BLE strength, lumbar stabilization, and range of motion. Neuromuscular re-education addresses balance, coordination, motor control, and gait. She would benefit from continued skilled physical therapy to further address functional limitations and impairments. She will be progressed as tolerated.  -AD     Rehab Potential Good  -AD     Patient/caregiver participated in establishment of treatment plan and goals Yes  -AD     Patient would benefit from skilled therapy intervention Yes  -AD     PT Plan    PT Frequency 2x/week  -AD     Predicted Duration of Therapy Intervention (days/wks) 4 weeks  -AD     Planned CPT's? PT EVAL HIGH COMPLEXITY: 49619;PT RE-EVAL: 19526;PT  NEUROMUSC RE-EDUCATION EA 15 MIN: 11594;PT MANUAL THERAPY EA 15 MIN: 95384;PT THER ACT EA 15 MIN: 80933;PT THER PROC EA 15 MIN: 25629;PT GAIT TRAINING EA 15 MIN: 36354;PT SELF CARE/HOME MGMT/TRAIN EA 15: 75433;PT ULTRASOUND EA 15 MIN: 27412;PT ELECTRICAL STIM ATTD EA 15 MIN: 30488;PT HOT OR COLD PACK TREAT [facility];PT THER SUPP EA 15 MIN;PT THER PROC GROUP: 20647  -AD     PT Plan Comments Progress as tolerated per POC.  -AD       User Key  (r) = Recorded By, (t) = Taken By, (c) = Cosigned By    Initials Name Provider Type    AD Ashley Claudene Dalton, PT Physical Therapist                   Exercises       02/07/18 1500          Subjective Comments    Subjective Comments Pt reported no pain prior to today's treatment session. She was able to verbalize the absence of pain with extra time.  -AD      Subjective Pain    Able to rate subjective pain? yes  -AD      Pre-Treatment Pain Level 0  -AD      Post-Treatment Pain Level 0  -AD      Exercise 1    Exercise Name 1 LAQ, seated march, RTB knee flexion, RTB hip abduction, AP, GS, QS, SLR, LTR, supine march, BKFO  -AD      Cueing 1 Verbal;Tactile  -AD      Time (Minutes) 1 30 minutes  -AD      Exercise 2    Exercise Name 2 Neuro: Sit<>stand, standing with balloon tap, semi tandem stance.  -AD      Cueing 2 Tactile;Verbal  -AD      Time (Minutes) 2 15 minutes  -AD      Additional Comments Neuro activities performed in // bars with CGA.  -AD        User Key  (r) = Recorded By, (t) = Taken By, (c) = Cosigned By    Initials Name Provider Type    AD Ashley Claudene Dalton, PT Physical Therapist                      Outcome Measure Options: Tinetti  Tinetti Assessment  Sitting Balance: Steady,safe  Arises: Able, uses arms  Attempts to Rise: Able, requires > 1 attempt  Immediate Standing Balance (first 5 sec): Steady, with support  Standing Balance: Steady, stance > 4 inch KINA & requires support  Sternal Nudge (feet close together): Begins to fall  Eyes Closed (feet close  "together): Unsteady  Turning 360 Degrees- Steps: Discontinuous steps  Turning 360 Degrees- Steadiness: Unsteady (staggers, grabs)  Sitting Down: Uses arms or not a smooth motion  Tinetti Balance Score: 6  Gait Initiation (immediate after told \"go\"): No hesitancy  Step Length- Right Swing: Right swing foot does not pass Left stance leg  Step Length- Left Swing: Left swing foot does not pass Right  Foot Clearance- Right Foot: Right foot does not completely clear floor  Foot Clearance- Left Foot: Left foot does not completely clear floor  Step Symmetry: Right and Left step length equal  Step Continuity: Steps appear continuous  Path (excursion): Mild/moderate deviation or use of aid  Trunk: No sway but knee or trunk flexion or spread arms while walking  Base of Support: Heels apart  Gait Score: 5  Tinetti Total Score: 11    Time Calculation:   Start Time: 1400  Stop Time: 1452  Time Calculation (min): 52 min     Therapy Charges for Today     Code Description Service Date Service Provider Modifiers Qty    97136665698 HC PT MOBILITY CURRENT 2/7/2018 Ashley Claudene Dalton, PT GP, CL 1    59070404105 HC PT MOBILITY PROJECTED 2/7/2018 Ashley Claudene Dalton, PT GP, CJ 1    12054458647 HC PT THER PROC EA 15 MIN 2/7/2018 Ashley Claudene Dalton, PT GP 2    78274011707 HC PT NEUROMUSC RE EDUCATION EA 15 MIN 2/7/2018 Ashley Claudene Dalton, PT GP 1          PT G-Codes  Outcome Measure Options: Tinetti  Score: 11  Functional Limitation: Mobility: Walking and moving around  Mobility: Walking and Moving Around Current Status (): At least 60 percent but less than 80 percent impaired, limited or restricted  Mobility: Walking and Moving Around Goal Status (): At least 20 percent but less than 40 percent impaired, limited or restricted         Ashley Claudene Dalton, PT  2/7/2018     "

## 2018-02-08 NOTE — THERAPY RE-EVALUATION
Outpatient Occupational Therapy Neuro Re-Evaluation  MARIANO Modi     Patient Name: Sunhsine Her  : 1945  MRN: [id number]  Today's Date: 2018      Visit Date: 2018    Patient Active Problem List   Diagnosis   • Generalized weakness   • Acute on chronic renal insufficiency   • Anemia   • Symptomatic bradycardia   • Leukopenia   • Hypothyroid   • Bipolar affective disorder   • Kidney function abnormal   • COPD (chronic obstructive pulmonary disease)   • CVA (cerebral vascular accident)-Right Occipital/Temporal   • Chronic kidney disease (CKD), stage III (moderate)   • Dementia   • Vertigo   • History of stroke   • Iron deficiency anemia   • CKD (chronic kidney disease), stage III   • Vascular dementia   • Hypertension   • Hyperlipidemia   • Tobacco abuse   • Malignant hypertension   • Orthostasis   • Impaired functional mobility, balance, gait, and endurance   • Sepsis   • Paroxysmal atrial fibrillation   • Dyslipidemia        Past Medical History:   Diagnosis Date   • A-fib    • Acute on chronic renal insufficiency 7/15/2016   • Anemia    • Anxiety    • Arthritis    • Arthritis    • Bipolar 1 disorder    • Bradycardia    • COPD (chronic obstructive pulmonary disease)    • Coronary artery disease    • Dementia    • Disease of thyroid gland    • Generalized weakness 2016   • GERD (gastroesophageal reflux disease)    • Heart disease    • History of transfusion    • Hyperlipidemia    • Hypertension    • PAD (peripheral artery disease)    • Stroke     2016, 2017, 2017   • TIA (transient ischemic attack)    • Vertigo         Past Surgical History:   Procedure Laterality Date   • ANGIOPLASTY SUBCLAVIAN ARTERY     • CAROTID ENDARTERECTOMY Bilateral    • COLONOSCOPY     • COLONOSCOPY N/A 2017    Procedure: COLONOSCOPY  CPTCODE:87757;  Surgeon: Suraj Quintanilla III, MD;  Location: [facility];  Service:    • ENDOSCOPY     • ENDOSCOPY N/A 2017    Procedure: ESOPHAGOGASTRODUODENOSCOPY  WITH BIOPSY  CPTCODE:[id number];  Surgeon: Suraj Quintanilla III, MD;  Location: [facility] OR;  Service:    • ENDOSCOPY W/ PEG TUBE PLACEMENT N/A 6/28/2017    Procedure: ESOPHAGOGASTRODUODENOSCOPY WITH PERCUTANEOUS ENDOSCOPIC GASTROSTOMY TUBE INSERTION;  Surgeon: Renan Montanez MD;  Location: [facility];  Service:    • EYE SURGERY      bilat cataracts   • RENAL ARTERY BYPASS           Visit Dx:      ICD-10-CM ICD-9-CM   1. Right hemiparesis G81.91 342.90                 OT Neuro       02/08/18 1607          Coordination    Coordination Tests --   unable  -AH        User Key  (r) = Recorded By, (t) = Taken By, (c) = Cosigned By    Initials Name Provider Type     Dana Ludwig, OT Occupational Therapist                             OT Goals       02/08/18 1600       OT Short Term Goals    STG 1 Progress Progressing  -AH     STG 2 Progress Progressing  -AH     STG 3 Progress Progressing  -AH     STG 4 Progress Partially Met  -AH     Long Term Goals    LTG 1 Progress Progressing  -AH     LTG 2 Progress Progressing  -AH     LTG 3 Progress Progressing  -AH     LTG 4 Progress Progressing  -AH     LTG 5 Progress Progressing  -AH       User Key  (r) = Recorded By, (t) = Taken By, (c) = Cosigned By    Initials Name Provider Type     Dana Ludwig, OT Occupational Therapist                    OT Exercises       02/08/18 1605 02/08/18 1600       Subjective Comments    Subjective Comments --   sunshine has attended all therapy sessions consistently  - Sunshine has attended therapy consistently this monthl.  -AH     Exercise 1    Exercise Name 1 PROM right UE  -AH PROM right UE  -AH     Exercise 2    Exercise Name 2 overhed pulleys  -AH overhed pulleys  -AH     Exercise 3    Exercise Name 3 table top dusting  -AH table top dusting  -AH     Exercise 4    Exercise Name 4 handgripper right hand  -AH handgripper right hand  -AH       User Key  (r) = Recorded By, (t) = Taken By, (c) = Cosigned By    Initials Name  Provider Type     Dana Ludwig OT Occupational Therapist                        Time Calculation:   OT Start Time: 1400  OT Stop Time: 1500  OT Time Calculation (min): 60 min     Therapy Charges for Today     Code Description Service Date Service Provider Modifiers Qty    36383088364 HC OT CARRY MOV HAND OBJ CURRENT 2/7/2018 Dana Ludwig OT GO, CM 1    54896234690 HC OT CARRY MOV HAND OBJ PROJECTED 2/7/2018 Dana Ludwig OT GO, CK 1          OT G-codes  Carrying, Moving and Handling Objects Current Status (): At least 80 percent but less than 100 percent impaired, limited or restricted  Carrying, Moving and Handling Objects Goal Status (): At least 40 percent but less than 60 percent impaired, limited or restricted          Dana Ludwig OT  2/8/2018

## 2018-02-12 ENCOUNTER — APPOINTMENT (OUTPATIENT)
Dept: SPEECH THERAPY | Facility: HOSPITAL | Age: 73
End: 2018-02-12

## 2018-02-13 ENCOUNTER — HOSPITAL ENCOUNTER (OUTPATIENT)
Dept: OCCUPATIONAL THERAPY | Facility: HOSPITAL | Age: 73
Setting detail: THERAPIES SERIES
Discharge: HOME | End: 2018-02-13

## 2018-02-13 ENCOUNTER — HOSPITAL ENCOUNTER (OUTPATIENT)
Dept: SPEECH THERAPY | Facility: HOSPITAL | Age: 73
Setting detail: THERAPIES SERIES
Discharge: HOME | End: 2018-02-13

## 2018-02-13 ENCOUNTER — HOSPITAL ENCOUNTER (OUTPATIENT)
Dept: PHYSICAL THERAPY | Facility: HOSPITAL | Age: 73
Setting detail: THERAPIES SERIES
Discharge: HOME | End: 2018-02-13

## 2018-02-13 DIAGNOSIS — R26.81 UNSTEADY GAIT: Primary | ICD-10-CM

## 2018-02-13 DIAGNOSIS — G81.91 RIGHT HEMIPARESIS (HCC): ICD-10-CM

## 2018-02-13 DIAGNOSIS — G81.91 RIGHT HEMIPARESIS (HCC): Primary | ICD-10-CM

## 2018-02-13 DIAGNOSIS — I63.9 CEREBROVASCULAR ACCIDENT (CVA), UNSPECIFIED MECHANISM (HCC): Primary | ICD-10-CM

## 2018-02-13 PROCEDURE — 97112 NEUROMUSCULAR REEDUCATION: CPT

## 2018-02-13 PROCEDURE — 92507 TX SP LANG VOICE COMM INDIV: CPT | Performed by: SPEECH-LANGUAGE PATHOLOGIST

## 2018-02-13 PROCEDURE — 97530 THERAPEUTIC ACTIVITIES: CPT | Performed by: OCCUPATIONAL THERAPIST

## 2018-02-13 PROCEDURE — 97110 THERAPEUTIC EXERCISES: CPT

## 2018-02-13 NOTE — PROGRESS NOTES
Outpatient Occupational Therapy Neuro Treatment Note  MARIANO Modi     Patient Name: Sunshine Her  : 1945  MRN: [id number]  Today's Date: 2018       Visit Date: 2018    Patient Active Problem List   Diagnosis   • Generalized weakness   • Acute on chronic renal insufficiency   • Anemia   • Symptomatic bradycardia   • Leukopenia   • Hypothyroid   • Bipolar affective disorder   • Kidney function abnormal   • COPD (chronic obstructive pulmonary disease)   • CVA (cerebral vascular accident)-Right Occipital/Temporal   • Chronic kidney disease (CKD), stage III (moderate)   • Dementia   • Vertigo   • History of stroke   • Iron deficiency anemia   • CKD (chronic kidney disease), stage III   • Vascular dementia   • Hypertension   • Hyperlipidemia   • Tobacco abuse   • Malignant hypertension   • Orthostasis   • Impaired functional mobility, balance, gait, and endurance   • Sepsis   • Paroxysmal atrial fibrillation   • Dyslipidemia        Past Medical History:   Diagnosis Date   • A-fib    • Acute on chronic renal insufficiency 7/15/2016   • Anemia    • Anxiety    • Arthritis    • Arthritis    • Bipolar 1 disorder    • Bradycardia    • COPD (chronic obstructive pulmonary disease)    • Coronary artery disease    • Dementia    • Disease of thyroid gland    • Generalized weakness 2016   • GERD (gastroesophageal reflux disease)    • Heart disease    • History of transfusion    • Hyperlipidemia    • Hypertension    • PAD (peripheral artery disease)    • Stroke     2016, 2017, 2017   • TIA (transient ischemic attack)    • Vertigo         Past Surgical History:   Procedure Laterality Date   • ANGIOPLASTY SUBCLAVIAN ARTERY     • CAROTID ENDARTERECTOMY Bilateral    • COLONOSCOPY     • COLONOSCOPY N/A 2017    Procedure: COLONOSCOPY  CPTCODE:[id number];  Surgeon: Suraj Quintanilla III, MD;  Location: [facility];  Service:    • ENDOSCOPY     • ENDOSCOPY N/A 2017    Procedure: ESOPHAGOGASTRODUODENOSCOPY  WITH BIOPSY  CPTCODE:58510;  Surgeon: Suraj Quintanilla III, MD;  Location: [facility] OR;  Service:    • ENDOSCOPY W/ PEG TUBE PLACEMENT N/A 6/28/2017    Procedure: ESOPHAGOGASTRODUODENOSCOPY WITH PERCUTANEOUS ENDOSCOPIC GASTROSTOMY TUBE INSERTION;  Surgeon: Renan Montanez MD;  Location: [facility] OR;  Service:    • EYE SURGERY      bilat cataracts   • RENAL ARTERY BYPASS           Visit Dx:    ICD-10-CM ICD-9-CM   1. Right hemiparesis G81.91 342.90                         OT Assessment/Plan       02/13/18 1610       OT Assessment    Assessment Comments Sunshine continues to require cues to complete activities, she demonstrates some increased /grasp today  -     OT Plan    OT Plan Comments continue OT  -AH       User Key  (r) = Recorded By, (t) = Taken By, (c) = Cosigned By    Initials Name Provider Type     Dana Ludwig OT Occupational Therapist                             OT Exercises       02/13/18 1600          Exercise 1    Exercise Name 1 PROM right UE  -AH      Exercise 2    Exercise Name 2 overhead pulleys  -AH      Exercise 3    Exercise Name 3 table top dusting and arm skate right UE  -AH      Exercise 4    Exercise Name 4 handgripper  -AH        User Key  (r) = Recorded By, (t) = Taken By, (c) = Cosigned By    Initials Name Provider Type    ZULEYMA Ludwig OT Occupational Therapist                        Time Calculation:   OT Start Time: 1400  OT Stop Time: 1455  OT Time Calculation (min): 55 min     Therapy Charges for Today     Code Description Service Date Service Provider Modifiers Qty    35161156741  OT THERAPEUTIC ACT EA 15 MIN 2/13/2018 Dana Ludwig OT GO 4                    Dana Ludwig OT  2/13/2018

## 2018-02-13 NOTE — THERAPY TREATMENT NOTE
Outpatient Physical Therapy Neuro Treatment Note   Rian     Patient Name: Sunshine Her  : 1945  MRN: [id number]  Today's Date: 2018      Visit Date: 2018    Visit Dx:    ICD-10-CM ICD-9-CM   1. Unsteady gait R26.81 781.2   2. Right hemiparesis G81.91 342.90       Patient Active Problem List   Diagnosis   • Generalized weakness   • Acute on chronic renal insufficiency   • Anemia   • Symptomatic bradycardia   • Leukopenia   • Hypothyroid   • Bipolar affective disorder   • Kidney function abnormal   • COPD (chronic obstructive pulmonary disease)   • CVA (cerebral vascular accident)-Right Occipital/Temporal   • Chronic kidney disease (CKD), stage III (moderate)   • Dementia   • Vertigo   • History of stroke   • Iron deficiency anemia   • CKD (chronic kidney disease), stage III   • Vascular dementia   • Hypertension   • Hyperlipidemia   • Tobacco abuse   • Malignant hypertension   • Orthostasis   • Impaired functional mobility, balance, gait, and endurance   • Sepsis   • Paroxysmal atrial fibrillation   • Dyslipidemia                 PT Neuro       18 1600          Subjective Comments    Subjective Comments Patient arrives to therapy today w/ her daughter.  Pt and daughter state of no complaints.   -KAMINI      Subjective Pain    Able to rate subjective pain? yes  -KAMINI      Pre-Treatment Pain Level 0  -KAMINI      Post-Treatment Pain Level 0  -KAMINI        User Key  (r) = Recorded By, (t) = Taken By, (c) = Cosigned By    Initials Name Provider Type    KAMINI Stock, PTA Physical Therapy Assistant                        PT Assessment/Plan       18 9260       PT Assessment    Assessment Comments Patient tolerated treatment well today w/ rest breaks provided as needed secondary to fatigue.  Treatment today began w/  therex for improved functional mobility and LE strength f/b gait training as listed in flow sheet.  Pt received verbal and tactile cues throughout session for improved  "feedback and for max benefit w/ activities.  Pt's O2 levels monitored throughout session.  Pt's family educated to monitor pt's oxygen levels w/ family verbalizing understanding.  Pt continues to progress as tolerated.  No complaints noted during/ following tx.   -KAMINI     PT Plan    PT Plan Comments Continue with PT's POC and progress tx as tolerated by patient.   -KAMINI       User Key  (r) = Recorded By, (t) = Taken By, (c) = Cosigned By    Initials Name Provider Type    KAMINI Stock PTA Physical Therapy Assistant                     Exercises       02/13/18 1600          Subjective Comments    Subjective Comments Patient arrives to therapy today w/ her daughter.  Pt and daughter state of no complaints.   -KAMINI      Subjective Pain    Able to rate subjective pain? yes  -KAMINI      Pre-Treatment Pain Level 0  -KAMINI      Post-Treatment Pain Level 0  -KAMINI      Exercise 1    Exercise Name 1 ball squeeze 15x2, LAQ 15x2, seated march 15x2, knee flex w/ tband (green) 15x2, hip abd w/tband (green) 15x2, SAQ 15x2, GS/QS 15x2. step ups 6\" x10  -KAMINI      Cueing 1 Verbal;Tactile;Demo  -KAMINI      Time (Minutes) 1 30 min  -KAMINI      Exercise 3    Exercise Name 3 Gait:  70' w/ LBQC, 70' w/ SBQC   -KAMINI      Cueing 3 Demo;Tactile;Verbal  -KAMINI      Time (Seconds) 3 10 min  -KAMINI        User Key  (r) = Recorded By, (t) = Taken By, (c) = Cosigned By    Initials Name Provider Type    KAMINI Stock PTA Physical Therapy Assistant                            Therapy Education  Given: HEP, Symptoms/condition management, Fall prevention and home safety, Mobility training  Program: Reinforced  How Provided: Verbal, Demonstration  Provided to: Patient  Level of Understanding: Verbalized, Demonstrated              Time Calculation:   Start Time: 1455  Stop Time: 1550  Time Calculation (min): 55 min     Therapy Charges for Today     Code Description Service Date Service Provider Modifiers Qty    21909801970  PT THER PROC EA 15 MIN " 2/13/2018 Sherlyn Stock, TONYA 58, GP 2    70707622892 HC PT NEUROMUSC RE EDUCATION EA 15 MIN 2/13/2018 Sherlyn Stock, TONYA 58, GP 1    18397612627 HC PT THER SUPP EA 15 MIN 2/13/2018 Sherlyn Stock, TONYA 58, GP 1                    Sherlyn Yusuf. Dayami, TONYA  2/13/2018

## 2018-02-13 NOTE — PROGRESS NOTES
Outpatient Speech Language Pathology   Peds Speech Language Treatment Note  MARIANO Modi     Patient Name: Sunshine Her  : 1945  MRN: [id number]  Today's Date: 2018      Visit Date: 2018      Patient Active Problem List   Diagnosis   • Generalized weakness   • Acute on chronic renal insufficiency   • Anemia   • Symptomatic bradycardia   • Leukopenia   • Hypothyroid   • Bipolar affective disorder   • Kidney function abnormal   • COPD (chronic obstructive pulmonary disease)   • CVA (cerebral vascular accident)-Right Occipital/Temporal   • Chronic kidney disease (CKD), stage III (moderate)   • Dementia   • Vertigo   • History of stroke   • Iron deficiency anemia   • CKD (chronic kidney disease), stage III   • Vascular dementia   • Hypertension   • Hyperlipidemia   • Tobacco abuse   • Malignant hypertension   • Orthostasis   • Impaired functional mobility, balance, gait, and endurance   • Sepsis   • Paroxysmal atrial fibrillation   • Dyslipidemia       Visit Dx:    ICD-10-CM ICD-9-CM   1. Cerebrovascular accident (CVA), unspecified mechanism I63.9 434.91          Long term goals:  1. Patient will improve expressive language skills to allow for maximal functional communication and independence in adls.          *pt progressing as expected.  2. Patient will improve receptive language skills to allow for maximal functional communication and independence in adls.   *pt progressing as expected.        Short term goals:  1. Patient will produce rote tasks w/ max cues at 90% across 3 consecutive sessions.   *Pt produces rote tasks w/ max cues at 80% accuracy.     2.  Patient will produce personal identifiers w/ max cues/models 3/5 opp over 3 consecutive sessions.   *Pt produces personal identifiers 2/5 opp mod cues.     3.  Patientwill imitate confrontation naming of simple, common objects w/ max cues and model 80% in gross approximation across 3 consecutive sessions.   *Pt imitates confrontation naming of  simple, common objects w/ mod cues and verbal model 60% in gross approximation.    4.  Patient will match word to object 80% across 3 consecutive sessions w/ max cues.   *Pt matches word to object 75% this session w/ mod cues.    5.  Patient will respond to moderate y/n questions at 80% w/ max cues across 3 consecutive sessions.   Pt responds to moderate y/n questions at 75% w/ max cues.    6.  Patient will follow simple functional directives in adls 2-3 steps, w/max cues across 3 consecutive sessions.   *Pt follows 1 & 2 step simple directives w/ min-mod cues.    7.  Patient will copy/trace her name common words at 80% w/ max cues across 3 consecutive sessions.  *pt copies name, common one-syllable words w/ L hand w/ 60% accuracy and mod cues.      8. Patient will repeat at single syllable level in gross approximation w/ max cues w/ 50% acc across 3 consecutive sessions  *Pt repeats at single syllable level in gross approximation 60% accuracy w/ max cues.      *Further goals may be added pending progress towards this POC         Education: Pt educated on progress during today's session. Pt head nods in agreement. D/w pt's daugther goals targeted during therapy session.        Thank you-  Ridge Givens M.A., CFY-SLP           Time Calculation:   SLP Start Time: 1300  SLP Stop Time: 1340  SLP Time Calculation (min): 40 min  SLP Non-Billable Time (min): 30 min    Therapy Charges for Today     Code Description Service Date Service Provider Modifiers Qty    99406173776  ST CARE PLAN 15 MIN 2/13/2018 Gaston Givens GN 2    [id number]  ST TREATMENT SPEECH 3 2/13/2018 Gaston Givens 58, GN 1            Gaston Givens  2/13/2018

## 2018-02-14 ENCOUNTER — HOSPITAL ENCOUNTER (OUTPATIENT)
Dept: SPEECH THERAPY | Facility: HOSPITAL | Age: 73
Setting detail: THERAPIES SERIES
Discharge: HOME | End: 2018-02-14

## 2018-02-14 ENCOUNTER — HOSPITAL ENCOUNTER (OUTPATIENT)
Dept: PHYSICAL THERAPY | Facility: HOSPITAL | Age: 73
Setting detail: THERAPIES SERIES
Discharge: HOME | End: 2018-02-14

## 2018-02-14 DIAGNOSIS — G81.91 RIGHT HEMIPARESIS (HCC): ICD-10-CM

## 2018-02-14 DIAGNOSIS — R26.81 UNSTEADY GAIT: Primary | ICD-10-CM

## 2018-02-14 DIAGNOSIS — I63.9 CEREBROVASCULAR ACCIDENT (CVA), UNSPECIFIED MECHANISM (HCC): Primary | ICD-10-CM

## 2018-02-14 PROCEDURE — 97110 THERAPEUTIC EXERCISES: CPT

## 2018-02-14 PROCEDURE — 97110 THERAPEUTIC EXERCISES: CPT | Performed by: OCCUPATIONAL THERAPIST

## 2018-02-14 PROCEDURE — 97116 GAIT TRAINING THERAPY: CPT

## 2018-02-14 PROCEDURE — 97112 NEUROMUSCULAR REEDUCATION: CPT

## 2018-02-14 PROCEDURE — 92507 TX SP LANG VOICE COMM INDIV: CPT | Performed by: SPEECH-LANGUAGE PATHOLOGIST

## 2018-02-14 NOTE — THERAPY RE-EVALUATION
Outpatient Speech Language Pathology   Adult Speech Language Cognitive Re-Evaluation   [facility]     Patient Name: Sunshine Her  : 1945  MRN: [id number]  Today's Date: 2018        Visit Date: 2018   Patient Active Problem List   Diagnosis   • Generalized weakness   • Acute on chronic renal insufficiency   • Anemia   • Symptomatic bradycardia   • Leukopenia   • Hypothyroid   • Bipolar affective disorder   • Kidney function abnormal   • COPD (chronic obstructive pulmonary disease)   • CVA (cerebral vascular accident)-Right Occipital/Temporal   • Chronic kidney disease (CKD), stage III (moderate)   • Dementia   • Vertigo   • History of stroke   • Iron deficiency anemia   • CKD (chronic kidney disease), stage III   • Vascular dementia   • Hypertension   • Hyperlipidemia   • Tobacco abuse   • Malignant hypertension   • Orthostasis   • Impaired functional mobility, balance, gait, and endurance   • Sepsis   • Paroxysmal atrial fibrillation   • Dyslipidemia        Past Medical History:   Diagnosis Date   • A-fib    • Acute on chronic renal insufficiency 7/15/2016   • Anemia    • Anxiety    • Arthritis    • Arthritis    • Bipolar 1 disorder    • Bradycardia    • COPD (chronic obstructive pulmonary disease)    • Coronary artery disease    • Dementia    • Disease of thyroid gland    • Generalized weakness 2016   • GERD (gastroesophageal reflux disease)    • Heart disease    • History of transfusion    • Hyperlipidemia    • Hypertension    • PAD (peripheral artery disease)    • Stroke     2016, 2017, 2017   • TIA (transient ischemic attack)    • Vertigo         Past Surgical History:   Procedure Laterality Date   • ANGIOPLASTY SUBCLAVIAN ARTERY     • CAROTID ENDARTERECTOMY Bilateral    • COLONOSCOPY     • COLONOSCOPY N/A 2017    Procedure: COLONOSCOPY  CPTCODE:51342;  Surgeon: Suraj Quintanilla III, MD;  Location: [facility];  Service:    • ENDOSCOPY     • ENDOSCOPY N/A 2017     Procedure: ESOPHAGOGASTRODUODENOSCOPY WITH BIOPSY  CPTCODE:15441;  Surgeon: Suraj Quintanilla III, MD;  Location:  [facility];  Service:    • ENDOSCOPY W/ PEG TUBE PLACEMENT N/A 6/28/2017    Procedure: ESOPHAGOGASTRODUODENOSCOPY WITH PERCUTANEOUS ENDOSCOPIC GASTROSTOMY TUBE INSERTION;  Surgeon: Renan oMntanez MD;  Location:  [facility];  Service:    • EYE SURGERY      bilat cataracts   • RENAL ARTERY BYPASS           Visit Dx:    ICD-10-CM ICD-9-CM   1. Cerebrovascular accident (CVA), unspecified mechanism I63.9 434.91     Long term goals:  1. Patient will improve expressive language skills to allow for maximal functional communication and independence in adls.          *pt progressing as expected.    2. Patient will improve receptive language skills to allow for maximal functional communication and independence in adls.   *pt progressing as expected.         Short term goals:  1. Patient will produce rote tasks w/ max cues at 90% across 3 consecutive sessions.   *Pt produces rote tasks w/ max cues at 80% accuracy.      2.  Patient will produce personal identifiers w/ max cues/models 3/5 opp over 3 consecutive sessions.   *Pt produces personal identifiers 3/5 opp mod cues.      3.  Patientwill imitate confrontation naming of simple, common objects w/ max cues and model 80% in gross approximation across 3 consecutive sessions.   *Pt imitates confrontation naming of simple, common objects w/ mod cues and verbal model 60% in gross approximation.     4.  Patient will match word to object 80% across 3 consecutive sessions w/ max cues.   *Pt matches word to object 80% this session w/ mod cues.     5.  Patient will respond to moderate y/n questions at 80% w/ max cues across 3 consecutive sessions.   *Pt responds to moderate y/n questions at 75% w/ max cues.     6.  Patient will follow simple functional directives in adls 2-3 steps, w/max cues across 3 consecutive sessions.   *Pt follows 1 & 2 step simple directives w/  min-mod cues.     7.  Patient will copy/trace her name common words at 80% w/ max cues across 3 consecutive sessions.  *pt copies name, common one-syllable words w/ L hand w/ 60% accuracy and mod cues.       8. Patient will repeat at single syllable level in gross approximation w/ max cues w/ 50% acc across 3 consecutive sessions  *Pt repeats at single syllable level in gross approximation 60% accuracy w/ max cues.    9. Patient will expressively read names of common objects at 80% accuracy w/ mod cues across 3 consecutive sessions. *NEW GOAL    *Further goals may be added pending progress towards this POC    Educated pt on progress toward goals with her acknowledging understanding and agreement.     Thank you-  Leah Aparicio M.A., CCC-SLP        OP SLP Education       02/14/18 1405    Education    Education Comments d/w with pt progress during today's session w/ pt demonstrating understanding.  -SS      User Key  (r) = Recorded By, (t) = Taken By, (c) = Cosigned By    Initials Name Effective Dates    SS Leah Aparicio MA,CCC-SLP 12/20/17 -               OP SLP Assessment/Plan - 02/14/18 1400     SLP Assessment    Functional Problems Speech Language- Adult/Cognition  -SS    Impact on Function: Adult Speech Language/Cognition Difficulty communicating wants, needs, and ideas;Difficulty communicating in a medical emergency;Restrictions in personal and social life;Difficulty sequencing thoughts to express complex messages;Unable to understand written/spoken language;Difficulty following directions;Difficulty sequencing or problem solving to complete ADLs;Difficulty participating in avocational activities;Unable to complete specified job requirements;Decreased recall of personal information and medical history;Trouble learning or remembering new information;Requires supervision  -SS    Clinical Impression: Speech Language-Adult/Congnition Severe:;Receptive Aphasia;Expressive Aphasia  -SS    Functional Problems  Comment Pt is a 71 y/o female currently receiving speech therapy services at [facility] outpatient rehabilitation for s/l/cognitive. Pt is making steady progress towards her goals as she has shown improvements with increasing vocabulary, repetition of 1-2 syllable words, and naming common objects; however, pt continues to have difficulty following multistep commands, using words to communicate, sustaining attention. Pt continues to present w/ a severe receptive/expressive aphasia. Pt is unable to functionally/independently complete all adls, communicate w/ others, and function in daily life w/o assistance. Pt is felt to most benefit from continuation of formal ST services to address severe aphasia 2-3x week.  -SS    Clinical Impression Comments Pt continues to present w/ a severe receptive/expressive aphasia. Pt is unable to functionally/independently complete all adls, communicate w/ others, and function in daily life w/o assistance. Pt is felt to most benefit from continuation of formal ST services to address severe aphasia 2-3x week.  -SS    Please refer to paper survey for additional self-reported information Yes  -SS    Please refer to items scanned into chart for additional diagnostic informaiton and handouts as provided by clinician Yes  -SS    Prognosis Fair (comment)  -SS    Patient/caregiver participated in establishment of treatment plan and goals Yes  -SS    Patient would benefit from skilled therapy intervention Yes  -SS    SLP Plan    Frequency 2-3x per week  -SS    Duration x12 weeks  -SS    Planned CPT's? SLP INDIVIDUAL SPEECH THERAPY: 16237  -    Expected Duration Therapy Session (min) 15-30 minutes;30-45 minutes;45-60 minutes  -SS    Plan Comments continue POC  -SS      User Key  (r) = Recorded By, (t) = Taken By, (c) = Cosigned By    Initials Name Provider Type    SS Leah Aparicio MA,CCC-SLP Speech Therapist          Time Calculation:   SLP Start Time: 1315  SLP Stop Time: 1400  SLP Time  Calculation (min): 45 min  SLP Non-Billable Time (min): 30 min    Therapy Charges for Today     Code Description Service Date Service Provider Modifiers Qty    77465364696  ST TREATMENT SPEECH 3 2/14/2018 Leah Aparicio MA,CCC-SLP 59, GN 1    [id number]  ST CARE PLAN 15 MIN 2/14/2018 Leah Aparicio MA,CCC-SLP GN 2                   Leah Aparicio MA,CJ-SLP  2/14/2018

## 2018-02-14 NOTE — THERAPY TREATMENT NOTE
Outpatient Physical Therapy Neuro Treatment Note   [place]     Patient Name: Sunshine Her  : 1945  MRN: [id number]  Today's Date: 2018      Visit Date: 2018    Visit Dx:    ICD-10-CM ICD-9-CM   1. Unsteady gait R26.81 781.2   2. Right hemiparesis G81.91 342.90       Patient Active Problem List   Diagnosis   • Generalized weakness   • Acute on chronic renal insufficiency   • Anemia   • Symptomatic bradycardia   • Leukopenia   • Hypothyroid   • Bipolar affective disorder   • Kidney function abnormal   • COPD (chronic obstructive pulmonary disease)   • CVA (cerebral vascular accident)-Right Occipital/Temporal   • Chronic kidney disease (CKD), stage III (moderate)   • Dementia   • Vertigo   • History of stroke   • Iron deficiency anemia   • CKD (chronic kidney disease), stage III   • Vascular dementia   • Hypertension   • Hyperlipidemia   • Tobacco abuse   • Malignant hypertension   • Orthostasis   • Impaired functional mobility, balance, gait, and endurance   • Sepsis   • Paroxysmal atrial fibrillation   • Dyslipidemia                 PT Neuro       18 1500 18 1600       Subjective Comments    Subjective Comments Patient has no complaints upon arrival to therapy.  Pt able to use head nodding to communicate.   -KAMINI Patient arrives to therapy today w/ her daughter.  Pt and daughter state of no complaints.   -KAMINI     Subjective Pain    Able to rate subjective pain? yes  -KAMINI yes  -KAMINI     Pre-Treatment Pain Level 0  -KAMINI 0  -KAMINI     Post-Treatment Pain Level 0  -KAMINI 0  -KAMINI       User Key  (r) = Recorded By, (t) = Taken By, (c) = Cosigned By    Initials Name Provider Type    KAMINI Stock PTA Physical Therapy Assistant                        PT Assessment/Plan       18 1546 18 1627    PT Assessment    Assessment Comments Patient tolerated treatment well today w/ improved ambulation distance noted.  Pt continues to require cues during gait training for improved  "sequencing, postural awareness, and maxim.  Pt received rest breaks as needed.  Resistance of LE strengthening activities increased w/ good response.  No complaints or distress observed during today's tx.   -KAMINI Patient tolerated treatment well today w/ rest breaks provided as needed secondary to fatigue.  Treatment today began w/  therex for improved functional mobility and LE strength f/b gait training as listed in flow sheet.  Pt received verbal and tactile cues throughout session for improved feedback and for max benefit w/ activities.  Pt's O2 levels monitored throughout session.  Pt's family educated to monitor pt's oxygen levels w/ family verbalizing understanding.  Pt continues to progress as tolerated.  No complaints noted during/ following tx.   -KAMINI    PT Plan    PT Plan Comments Continue with PT's POC and progress tx as tolerated by patient.   -KAMINI Continue with PT's POC and progress tx as tolerated by patient.   -KAMINI      User Key  (r) = Recorded By, (t) = Taken By, (c) = Cosigned By    Initials Name Provider Type    KAMINI Sherlyn Stock PTA Physical Therapy Assistant                     Exercises       02/14/18 1500 02/13/18 1600       Subjective Comments    Subjective Comments Patient has no complaints upon arrival to therapy.  Pt able to use head nodding to communicate.   -KAMINI Patient arrives to therapy today w/ her daughter.  Pt and daughter state of no complaints.   -KAMINI     Subjective Pain    Able to rate subjective pain? yes  -KAMINI yes  -KAMINI     Pre-Treatment Pain Level 0  -KAMINI 0  -KAMINI     Post-Treatment Pain Level 0  -KAMINI 0  -KAMINI     Exercise 1    Exercise Name 1 ball squeeze 15x2, LAQ 10x2 (1#), seated march 10x2 (1#), hip abd w/ tband (green) 15x2, knee flex w/ tband (green)_x15, SAQ 15x2 (1#), GS 15x2, step ups 4\" x10  -KAMINI ball squeeze 15x2, LAQ 15x2, seated march 15x2, knee flex w/ tband (green) 15x2, hip abd w/tband (green) 15x2, SAQ 15x2, GS/QS 15x2. step ups 6\" x10  -KAMINI     Cueing 1 " "Verbal;Tactile;Demo  -KAMINI Verbal;Tactile;Demo  -KAMINI     Time (Minutes) 1 25 min  -KAMINI 30 min  -KAMINI     Exercise 2    Exercise Name 2 Neuro:  dynamic standing balance w/ cone reach in // bars, dynamic standing balance on foam w/ cone reach, alt foot placement on 4\" step x10  -KAMINI      Cueing 2 Verbal;Tactile;Demo  -KAMINI      Time (Minutes) 2 15 min  -KAMINI      Exercise 3    Exercise Name 3 Gait:  140' + 210' w/ SBQC   CGA/ MIN A  -KAMINI Gait:  70' w/ LBQC, 70' w/ SBQC   -KAMINI     Cueing 3 Demo;Tactile;Verbal  -KAMINI Demo;Tactile;Verbal  -KAMINI     Time (Seconds) 3 15 min  -KAMINI 10 min  -KAMINI       User Key  (r) = Recorded By, (t) = Taken By, (c) = Cosigned By    Initials Name Provider Type    KAMINI Sherlyn Stock PTA Physical Therapy Assistant                            Therapy Education  Given: HEP, Symptoms/condition management, Posture/body mechanics, Fall prevention and home safety, Mobility training  Program: Reinforced  How Provided: Verbal, Demonstration  Provided to: Patient  Level of Understanding: Verbalized, Demonstrated              Time Calculation:   Start Time: 1400  Stop Time: 1500  Time Calculation (min): 60 min     Therapy Charges for Today     Code Description Service Date Service Provider Modifiers Qty    45398509781 HC PT THER PROC EA 15 MIN 2/14/2018 Sherlyn Stock PTA 59, GP 2    94585096641 HC PT NEUROMUSC RE EDUCATION EA 15 MIN 2/14/2018 Sherlyn Stock PTA 59, GP 1    33028942977 HC GAIT TRAINING EA 15 MIN 2/14/2018 Sherlyn Stock PTA 59, GP 1                    Sherlyn Stock PTA  2/14/2018     "

## 2018-02-19 ENCOUNTER — HOSPITAL ENCOUNTER (OUTPATIENT)
Dept: PHYSICAL THERAPY | Facility: HOSPITAL | Age: 73
Setting detail: THERAPIES SERIES
Discharge: HOME | End: 2018-02-19

## 2018-02-19 ENCOUNTER — HOSPITAL ENCOUNTER (OUTPATIENT)
Dept: OCCUPATIONAL THERAPY | Facility: HOSPITAL | Age: 73
Setting detail: THERAPIES SERIES
Discharge: HOME | End: 2018-02-19

## 2018-02-19 ENCOUNTER — HOSPITAL ENCOUNTER (OUTPATIENT)
Dept: SPEECH THERAPY | Facility: HOSPITAL | Age: 73
Setting detail: THERAPIES SERIES
Discharge: HOME | End: 2018-02-19

## 2018-02-19 DIAGNOSIS — G81.91 RIGHT HEMIPARESIS (HCC): Primary | ICD-10-CM

## 2018-02-19 DIAGNOSIS — G81.91 RIGHT HEMIPARESIS (HCC): ICD-10-CM

## 2018-02-19 DIAGNOSIS — R26.81 UNSTEADY GAIT: Primary | ICD-10-CM

## 2018-02-19 DIAGNOSIS — I63.9 CEREBROVASCULAR ACCIDENT (CVA), UNSPECIFIED MECHANISM (HCC): Primary | ICD-10-CM

## 2018-02-19 PROCEDURE — 97112 NEUROMUSCULAR REEDUCATION: CPT

## 2018-02-19 PROCEDURE — 92507 TX SP LANG VOICE COMM INDIV: CPT | Performed by: SPEECH-LANGUAGE PATHOLOGIST

## 2018-02-19 PROCEDURE — 97110 THERAPEUTIC EXERCISES: CPT

## 2018-02-19 PROCEDURE — 97530 THERAPEUTIC ACTIVITIES: CPT | Performed by: OCCUPATIONAL THERAPIST

## 2018-02-19 NOTE — THERAPY TREATMENT NOTE
Outpatient Physical Therapy Neuro Treatment Note   [facility]     Patient Name: Sunshine Her  : 1945  MRN: [id number]  Today's Date: 2018      Visit Date: 2018    Visit Dx:    ICD-10-CM ICD-9-CM   1. Unsteady gait R26.81 781.2   2. Right hemiparesis G81.91 342.90       Patient Active Problem List   Diagnosis   • Generalized weakness   • Acute on chronic renal insufficiency   • Anemia   • Symptomatic bradycardia   • Leukopenia   • Hypothyroid   • Bipolar affective disorder   • Kidney function abnormal   • COPD (chronic obstructive pulmonary disease)   • CVA (cerebral vascular accident)-Right Occipital/Temporal   • Chronic kidney disease (CKD), stage III (moderate)   • Dementia   • Vertigo   • History of stroke   • Iron deficiency anemia   • CKD (chronic kidney disease), stage III   • Vascular dementia   • Hypertension   • Hyperlipidemia   • Tobacco abuse   • Malignant hypertension   • Orthostasis   • Impaired functional mobility, balance, gait, and endurance   • Sepsis   • Paroxysmal atrial fibrillation   • Dyslipidemia                 PT Neuro       18 1700          Subjective Comments    Subjective Comments Patient arrives to therapy today w/ her daughter.  Pt and daughter state of no complaints.   -KAMINI      Subjective Pain    Able to rate subjective pain? yes  -KAMINI      Pre-Treatment Pain Level 0  -KAMINI      Post-Treatment Pain Level 0  -KAMINI        User Key  (r) = Recorded By, (t) = Taken By, (c) = Cosigned By    Initials Name Provider Type    KAMINI Stock, PTA Physical Therapy Assistant                        PT Assessment/Plan       18 1717       PT Assessment    Assessment Comments Patient tolerated treatment well today, however continues to require frequent rest breaks secondary to fatigue.  Pt received verbal and tactile cues as well as demonstration of activities throughout session for improved feedback and for max benefits w/ activities.  Pt noted w/ improved  "mechanics and decreased verbal cueing during gait.  Pt continues to progress w/ therapy as tolerated to decrease fall risk and achieve maximum level of function.   -KAMINI     PT Plan    PT Plan Comments Continue with PT's POC and will progress tx as tolerated by patient.   -KAMINI       User Key  (r) = Recorded By, (t) = Taken By, (c) = Cosigned By    Initials Name Provider Type    KAMINI Stock PTA Physical Therapy Assistant                     Exercises       02/19/18 1700          Subjective Comments    Subjective Comments Patient arrives to therapy today w/ her daughter.  Pt and daughter state of no complaints.   -KAMINI      Subjective Pain    Able to rate subjective pain? yes  -KAMINI      Pre-Treatment Pain Level 0  -KAMINI      Post-Treatment Pain Level 0  -KAMINI      Exercise 1    Exercise Name 1 ball squeeze 15x2, LAQ 10x2 (2#), seated march 10x2 (2#), hip abd w/ tband (green) 15x2, knee flex w/ tband (green) 15x2, step ups 6\" 10x2  -KAMINI      Cueing 1 Verbal;Tactile;Demo  -KAMINI      Time (Minutes) 1 30 min  -KAMINI      Exercise 2    Exercise Name 2 Neuro:  static/ dynamic balance activities on foam w/ clothes pins  -KAMINI      Cueing 2 Verbal;Tactile;Demo  -KAMINI      Time (Minutes) 2 15 min  -KAMINI        User Key  (r) = Recorded By, (t) = Taken By, (c) = Cosigned By    Initials Name Provider Type    KAMINI Stock PTA Physical Therapy Assistant                            Therapy Education  Given: HEP, Symptoms/condition management, Posture/body mechanics, Fall prevention and home safety, Mobility training  Program: Reinforced  How Provided: Verbal, Demonstration  Provided to: Patient  Level of Understanding: Verbalized, Demonstrated              Time Calculation:   Start Time: 1455  Stop Time: 1545  Time Calculation (min): 50 min     Therapy Charges for Today     Code Description Service Date Service Provider Modifiers Qty    52621245699 HC PT THER PROC EA 15 MIN 2/19/2018 Sherlyn Stock PTA GP 2    " [id number]  PT NEUROMUSC RE EDUCATION EA 15 MIN 2/19/2018 Sherlyn Stock, PTA GP 1                    Sherlyn Yusuf. Dayami, PTA  2/19/2018

## 2018-02-19 NOTE — PROGRESS NOTES
Outpatient Occupational Therapy Neuro Treatment Note  [facility]     Patient Name: Sunshine Her  : 1945  MRN: [id number]  Today's Date: 2018       Visit Date: 2018    Patient Active Problem List   Diagnosis   • Generalized weakness   • Acute on chronic renal insufficiency   • Anemia   • Symptomatic bradycardia   • Leukopenia   • Hypothyroid   • Bipolar affective disorder   • Kidney function abnormal   • COPD (chronic obstructive pulmonary disease)   • CVA (cerebral vascular accident)-Right Occipital/Temporal   • Chronic kidney disease (CKD), stage III (moderate)   • Dementia   • Vertigo   • History of stroke   • Iron deficiency anemia   • CKD (chronic kidney disease), stage III   • Vascular dementia   • Hypertension   • Hyperlipidemia   • Tobacco abuse   • Malignant hypertension   • Orthostasis   • Impaired functional mobility, balance, gait, and endurance   • Sepsis   • Paroxysmal atrial fibrillation   • Dyslipidemia        Past Medical History:   Diagnosis Date   • A-fib    • Acute on chronic renal insufficiency 7/15/2016   • Anemia    • Anxiety    • Arthritis    • Arthritis    • Bipolar 1 disorder    • Bradycardia    • COPD (chronic obstructive pulmonary disease)    • Coronary artery disease    • Dementia    • Disease of thyroid gland    • Generalized weakness 2016   • GERD (gastroesophageal reflux disease)    • Heart disease    • History of transfusion    • Hyperlipidemia    • Hypertension    • PAD (peripheral artery disease)    • Stroke     2016, 2017, 2017   • TIA (transient ischemic attack)    • Vertigo         Past Surgical History:   Procedure Laterality Date   • ANGIOPLASTY SUBCLAVIAN ARTERY     • CAROTID ENDARTERECTOMY Bilateral    • COLONOSCOPY     • COLONOSCOPY N/A 2017    Procedure: COLONOSCOPY  CPTCODE:[id number];  Surgeon: Suraj Quintanilla III, MD;  Location: [facility];  Service:    • ENDOSCOPY     • ENDOSCOPY N/A 2017    Procedure: ESOPHAGOGASTRODUODENOSCOPY  WITH BIOPSY  CPTCODE:[id number];  Surgeon: Suraj Quintanilla III, MD;  Location: [facility] OR;  Service:    • ENDOSCOPY W/ PEG TUBE PLACEMENT N/A 6/28/2017    Procedure: ESOPHAGOGASTRODUODENOSCOPY WITH PERCUTANEOUS ENDOSCOPIC GASTROSTOMY TUBE INSERTION;  Surgeon: Renan Montanez MD;  Location: [facility] OR;  Service:    • EYE SURGERY      bilat cataracts   • RENAL ARTERY BYPASS           Visit Dx:    ICD-10-CM ICD-9-CM   1. Right hemiparesis G81.91 342.90                         OT Assessment/Plan       02/19/18 1520       OT Assessment    Assessment Comments sunshine demonstrates some increasing ROM and grasp.  She does continue to require hand over hand for grasp of bean bags and verbal cues to complete activities  -     OT Plan    OT Plan Comments continue OT  -       User Key  (r) = Recorded By, (t) = Taken By, (c) = Cosigned By    Initials Name Provider Type     Dana Ludwig OT Occupational Therapist                             OT Exercises       02/19/18 1500          Subjective Comments    Subjective Comments Sunshine tolerated therapy well with rest breaks provided.  -      Exercise 1    Exercise Name 1 ROM right UE with overhead pulleys with use of flexion glove 3 min x3  -      Exercise 2    Exercise Name 2 arm skate right UE AROM  -      Exercise 3    Exercise Name 3 transferring  bean bags with right hand grasp and release  with hand over hand assistance  -      Exercise 4    Exercise Name 4 handgripper right hand  -        User Key  (r) = Recorded By, (t) = Taken By, (c) = Cosigned By    Initials Name Provider Type     Dana Ludwig OT Occupational Therapist                        Time Calculation:   OT Start Time: 1400  OT Stop Time: 1455  OT Time Calculation (min): 55 min     Therapy Charges for Today     Code Description Service Date Service Provider Modifiers Qty    02569302682  OT THERAPEUTIC ACT EA 15 MIN 2/19/2018 Dana Ludwig OT GO 4                    Dana  Sheryl Ludwig, OT  2/19/2018

## 2018-02-19 NOTE — PROGRESS NOTES
Outpatient Speech Language Pathology   Adult Speech Language Cognitive Progress Note  MARIANO Modi     Patient Name: Sunshine Her  : 1945  MRN: [id number]  Today's Date: 2018         Visit Date: 2018   Patient Active Problem List   Diagnosis   • Generalized weakness   • Acute on chronic renal insufficiency   • Anemia   • Symptomatic bradycardia   • Leukopenia   • Hypothyroid   • Bipolar affective disorder   • Kidney function abnormal   • COPD (chronic obstructive pulmonary disease)   • CVA (cerebral vascular accident)-Right Occipital/Temporal   • Chronic kidney disease (CKD), stage III (moderate)   • Dementia   • Vertigo   • History of stroke   • Iron deficiency anemia   • CKD (chronic kidney disease), stage III   • Vascular dementia   • Hypertension   • Hyperlipidemia   • Tobacco abuse   • Malignant hypertension   • Orthostasis   • Impaired functional mobility, balance, gait, and endurance   • Sepsis   • Paroxysmal atrial fibrillation   • Dyslipidemia          Visit Dx:    ICD-10-CM ICD-9-CM   1. Cerebrovascular accident (CVA), unspecified mechanism I63.9 434.91       Long term goals:  1. Patient will improve expressive language skills to allow for maximal functional communication and independence in adls.          *pt progressing as expected.     2. Patient will improve receptive language skills to allow for maximal functional communication and independence in adls.   *pt progressing as expected.           Short term goals:  1. Patient will produce rote tasks w/ max cues at 90% across 3 consecutive sessions.   *Pt produces rote tasks w/ max cues at 80% accuracy.       2.  Patient will produce personal identifiers w/ max cues/models 3/5 opp over 3 consecutive sessions.   *Pt produces personal identifiers 3/5 opp mod cues.       3.  Patientwill imitate confrontation naming of simple, common objects w/ max cues and model 80% in gross approximation across 3 consecutive sessions.   *Pt imitates  confrontation naming of simple, common objects w/ mod cues and verbal model 65% in gross approximation.      4.  Patient will match word to object 80% across 3 consecutive sessions w/ max cues.   *Pt matches word to object 75% this session w/ mod cues.      5.  Patient will respond to moderate y/n questions at 80% w/ max cues across 3 consecutive sessions.   *Pt responds to moderate y/n questions at 70% w/ max cues.      6.  Patient will follow simple functional directives in adls 2-3 steps, w/max cues across 3 consecutive sessions.   *Pt follows 1 & 2 step simple directives w/ mod cues.      7.  Patient will copy/trace her name common words at 80% w/ max cues across 3 consecutive sessions.  *pt copies name, common one-syllable words w/ L hand w/ 60% accuracy and mod cues.        8. Patient will repeat at single syllable level in gross approximation w/ max cues w/ 50% acc across 3 consecutive sessions  *Pt repeats at single syllable level in gross approximation 60% accuracy w/ max cues.     9. Patient will expressively read names of common objects at 80% accuracy w/ mod cues across 3 consecutive sessions.    *pt reads names of common objects with 60% acc w/ mod cues.    *Further goals may be added pending progress towards this POC    Educated pt on progress toward goals, generalization ideas with her acknowledging understanding and agreement.      Thank you-  Leah Aparicio M.A., CCC-SLP          OP SLP Education       02/19/18 7323    Education    Education Comments d/w with pt progress during today's session w/ pt demonstrating understanding.  -      User Key  (r) = Recorded By, (t) = Taken By, (c) = Cosigned By    Initials Name Effective Dates     Leah Aparicio MA,CCC-SLP 12/20/17 -                      Time Calculation:   SLP Start Time: 1320  SLP Stop Time: 1400  SLP Time Calculation (min): 40 min  SLP Non-Billable Time (min): 30 min    Therapy Charges for Today     Code Description Service Date  Service Provider Modifiers Qty    04132274039 HC ST CARE PLAN 15 MIN 2/19/2018 Leah Aparicio MA,CCC-SLP GN 2    [id number]  ST TREATMENT SPEECH 3 2/19/2018 Leah Aparicio MA,[facility]-SLP GN, 59, KX 1                   Leah Aparicio MA,CJ-SLP  2/19/2018

## 2018-02-21 ENCOUNTER — HOSPITAL ENCOUNTER (OUTPATIENT)
Dept: PHYSICAL THERAPY | Facility: HOSPITAL | Age: 73
Setting detail: THERAPIES SERIES
Discharge: HOME | End: 2018-02-21

## 2018-02-21 ENCOUNTER — OFFICE VISIT (OUTPATIENT)
Dept: CARDIOLOGY | Facility: CLINIC | Age: 73
End: 2018-02-21

## 2018-02-21 ENCOUNTER — HOSPITAL ENCOUNTER (OUTPATIENT)
Dept: OCCUPATIONAL THERAPY | Facility: HOSPITAL | Age: 73
Setting detail: THERAPIES SERIES
Discharge: HOME | End: 2018-02-21

## 2018-02-21 ENCOUNTER — HOSPITAL ENCOUNTER (OUTPATIENT)
Dept: SPEECH THERAPY | Facility: HOSPITAL | Age: 73
Setting detail: THERAPIES SERIES
Discharge: HOME | End: 2018-02-21

## 2018-02-21 VITALS
SYSTOLIC BLOOD PRESSURE: 123 MMHG | HEIGHT: 67 IN | DIASTOLIC BLOOD PRESSURE: 74 MMHG | BODY MASS INDEX: 28.56 KG/M2 | HEART RATE: 73 BPM | WEIGHT: 182 LBS

## 2018-02-21 DIAGNOSIS — I48.0 PAROXYSMAL ATRIAL FIBRILLATION (HCC): Primary | ICD-10-CM

## 2018-02-21 DIAGNOSIS — G81.91 RIGHT HEMIPARESIS (HCC): Primary | ICD-10-CM

## 2018-02-21 DIAGNOSIS — I10 ESSENTIAL HYPERTENSION: Chronic | ICD-10-CM

## 2018-02-21 DIAGNOSIS — E78.2 MIXED HYPERLIPIDEMIA: Chronic | ICD-10-CM

## 2018-02-21 DIAGNOSIS — I63.9 CEREBROVASCULAR ACCIDENT (CVA), UNSPECIFIED MECHANISM (HCC): Primary | ICD-10-CM

## 2018-02-21 PROCEDURE — 97110 THERAPEUTIC EXERCISES: CPT

## 2018-02-21 PROCEDURE — 97112 NEUROMUSCULAR REEDUCATION: CPT

## 2018-02-21 PROCEDURE — 99213 OFFICE O/P EST LOW 20 MIN: CPT | Performed by: INTERNAL MEDICINE

## 2018-02-21 PROCEDURE — 92507 TX SP LANG VOICE COMM INDIV: CPT | Performed by: SPEECH-LANGUAGE PATHOLOGIST

## 2018-02-21 PROCEDURE — 97530 THERAPEUTIC ACTIVITIES: CPT | Performed by: OCCUPATIONAL THERAPIST

## 2018-02-21 RX ORDER — ASPIRIN 81 MG/1
81 TABLET ORAL DAILY
COMMUNITY

## 2018-02-21 RX ORDER — TRAZODONE HYDROCHLORIDE 150 MG/1
150 TABLET ORAL NIGHTLY
COMMUNITY

## 2018-02-21 RX ORDER — CHLORAL HYDRATE 500 MG
1000 CAPSULE ORAL
Qty: 90 CAPSULE | Refills: 6 | Status: SHIPPED | OUTPATIENT
Start: 2018-02-21

## 2018-02-21 NOTE — THERAPY TREATMENT NOTE
Outpatient Physical Therapy Neuro Treatment Note   Rian     Patient Name: Sunshine Her  : 1945  MRN: [id number]  Today's Date: 2018      Visit Date: 2018    Visit Dx:  No diagnosis found.    Patient Active Problem List   Diagnosis   • Generalized weakness   • Acute on chronic renal insufficiency   • Anemia   • Symptomatic bradycardia   • Leukopenia   • Hypothyroid   • Bipolar affective disorder   • Kidney function abnormal   • COPD (chronic obstructive pulmonary disease)   • CVA (cerebral vascular accident)-Right Occipital/Temporal   • Chronic kidney disease (CKD), stage III (moderate)   • Dementia   • Vertigo   • History of stroke   • Iron deficiency anemia   • CKD (chronic kidney disease), stage III   • Vascular dementia   • Hypertension   • Hyperlipidemia   • Tobacco abuse   • Malignant hypertension   • Orthostasis   • Impaired functional mobility, balance, gait, and endurance   • Sepsis   • Paroxysmal atrial fibrillation   • Dyslipidemia                 PT Neuro       18 1400          Subjective Comments    Subjective Comments Patient nods her head that she feels she is getting stronger w/therapy.  Pt nods head no, w/ no complaints of pain pre/ post tx.   -KAMINI      Subjective Pain    Able to rate subjective pain? yes  -KAMINI      Pre-Treatment Pain Level 0  -KAMINI      Post-Treatment Pain Level 0  -KAMINI        User Key  (r) = Recorded By, (t) = Taken By, (c) = Cosigned By    Initials Name Provider Type    KAMINI Stock PTA Physical Therapy Assistant                        PT Assessment/Plan       18 8787       PT Assessment    Assessment Comments Patient responded well to today's session, and required less assistance performing sit-stand activity; min-mod assist required.  Pt initiated treatment with therex f/b neuro re-ed for static and dynamic balance activities.  Pt able to stand for 2 minutes w/ supervision/ SBA only; no assistive device utilized.  Pt also able to  maintain balance w/ eyes closed for 1 min.  Treatment concluded with gait for improved endurance.  Pt required frequent cues throughout session secondary to decreased processing. And for improved mechanics.  Pt will be progressed as tolerated.  No distress observed during today's session.   -KAMINI     PT Plan    PT Plan Comments Continue with PT's POC and progress tx as tolerated by patient.   -KAMINI       User Key  (r) = Recorded By, (t) = Taken By, (c) = Cosigned By    Initials Name Provider Type    KAMINI Stock PTA Physical Therapy Assistant                     Exercises       02/21/18 1400          Subjective Comments    Subjective Comments Patient nods her head that she feels she is getting stronger.  Pt nods head no, w/ no complaints of pain pre/ post tx.   -KAMINI      Subjective Pain    Able to rate subjective pain? yes  -KAMINI      Pre-Treatment Pain Level 0  -KAMINI      Post-Treatment Pain Level 0  -KAMINI      Exercise 1    Exercise Name 1 ball squeeze 15x2, LAQ 15x2 (2#), seated march 10x2 (2#), hip abd w/ tband (green) 15x2, knee flex w/ tband (green) 15x2  -KAMINI      Cueing 1 Verbal;Demo;Tactile  -KAMINI      Time (Minutes) 1 25 min  -KAMINI      Exercise 2    Exercise Name 2 Neuro:  sit-stand x 5, static standing balance w/ no assistive device, static standing balance w/ eyes closed, side stepping, gait w/ RW for improved endurance  -KAMINI      Cueing 2 Verbal;Tactile;Demo  -KAMINI      Time (Minutes) 2 30 min  -KAMINI        User Key  (r) = Recorded By, (t) = Taken By, (c) = Cosigned By    Initials Name Provider Type    KAMINI Stock PTA Physical Therapy Assistant                            Therapy Education  Given: HEP, Symptoms/condition management, Posture/body mechanics, Fall prevention and home safety, Mobility training  Program: Reinforced  How Provided: Verbal, Demonstration  Provided to: Patient  Level of Understanding: Verbalized, Demonstrated              Time Calculation:   Start Time: 1330  Stop Time:  1430  Time Calculation (min): 60 min     Therapy Charges for Today     Code Description Service Date Service Provider Modifiers Qty    36855552460 HC PT THER PROC EA 15 MIN 2/21/2018 Sherlyn Stock PTA GP, KX, 59 2    46523956139 HC PT NEUROMUSC RE EDUCATION EA 15 MIN 2/21/2018 Sherlyn Stock PTA GP, KX, 59 2    27774839713 HC PT THER SUPP EA 15 MIN 2/21/2018 Sherlyn Stock PTA GP, 59 1                    Sherlyn Yusuf. TONYA Stock  2/21/2018

## 2018-02-21 NOTE — PROGRESS NOTES
MONTANA Montez  Sunshine Her  : 1945  DATE:10/10/2017    Patient Active Problem List   Diagnosis   • Generalized weakness   • Acute on chronic renal insufficiency   • Anemia   • Symptomatic bradycardia   • Leukopenia   • Hypothyroid   • Bipolar affective disorder   • Kidney function abnormal   • COPD (chronic obstructive pulmonary disease)   • CVA (cerebral vascular accident)-Right Occipital/Temporal   • Chronic kidney disease (CKD), stage III (moderate)   • Dementia   • Vertigo   • History of stroke   • Iron deficiency anemia   • CKD (chronic kidney disease), stage III   • Vascular dementia   • Hypertension   • Hyperlipidemia   • Tobacco abuse   • Malignant hypertension   • Orthostasis   • Impaired functional mobility, balance, gait, and endurance   • Sepsis   • Paroxysmal atrial fibrillation   • Dyslipidemia       Dear MONTANA Montez:    Subjective     The patient has dementia But appears to be doing better today.  She was able to provide some history on her medical conditions.    Sunshine Her is a 72 y.o. female with the above medical problems who is being seen for follow-up.  According to the patient she has been doing well since her last visit.  She denies any chest pain, shortness breath, palpitations, orthopnea, PND, lower extremity edema, dizziness or syncope.  She has a history of atrial fibrillation for which she appears to be in junctional rhythm at this point.  Heart rate appears to be well-controlled.  She is on Eliquis for stroke prevention with no evidence of bleeds.  She has a history of hypertension that appears to be well-controlled on current regimen.      Current Outpatient Prescriptions:   •  acetaminophen (TYLENOL) 500 MG tablet, 500 mg by Per PEG Tube route Every 4 (Four) Hours As Needed for Mild Pain (1-3)., Disp: , Rfl:   •  albuterol (PROVENTIL HFA;VENTOLIN HFA) 108 (90 Base) MCG/ACT inhaler, Inhale 2 puffs Every 6 (Six) Hours As Needed for Wheezing.,  Disp: 1 inhaler, Rfl: 0  •  amLODIPine (NORVASC) 5 MG tablet, 2 tablets by Per G Tube route Daily., Disp: 30 tablet, Rfl: 6  •  apixaban (ELIQUIS) 5 MG tablet tablet, Take 1 tablet by mouth 2 (Two) Times a Day., Disp: 60 tablet, Rfl: 4  •  apixaban (ELIQUIS) 5 MG tablet tablet, Take 1 tablet by mouth 2 (Two) Times a Day., Disp: 60 tablet, Rfl: 3  •  aspirin 81 MG EC tablet, Take 81 mg by mouth Daily., Disp: , Rfl:   •  atenolol (TENORMIN) 25 MG tablet, Take 25 mg by mouth Daily., Disp: , Rfl:   •  atorvastatin (LIPITOR) 40 MG tablet, Take 1 tablet by mouth Daily. (Patient taking differently: 40 mg by Per PEG Tube route Every Night.), Disp: , Rfl:   •  donepezil (ARICEPT) 10 MG tablet, Take 10 mg by mouth Every Night., Disp: , Rfl:   •  DULoxetine (CYMBALTA) 60 MG capsule, Take 60 mg by mouth At Night As Needed., Disp: , Rfl:   •  ferrous sulfate (IRON SUPPLEMENT) 325 (65 FE) MG tablet, Take 1 tablet by mouth 2 (Two) Times a Day With Meals., Disp: 60 tablet, Rfl: 0  •  levothyroxine (SYNTHROID, LEVOTHROID) 25 MCG tablet, 25 mcg by Per PEG Tube route Daily., Disp: , Rfl:   •  loratadine (CLARITIN) 10 MG tablet, Take 10 mg by mouth Daily., Disp: , Rfl:   •  memantine (NAMENDA) 10 MG tablet, Take 1 tablet by mouth 2 (two) times a day. (Patient taking differently: 10 mg by Per PEG Tube route 2 (Two) Times a Day.), Disp: 60 tablet, Rfl: 1  •  mirtazapine (REMERON) 30 MG tablet, 30 mg by Per PEG Tube route Every Night., Disp: , Rfl:   •  pantoprazole (PROTONIX) 40 MG EC tablet, Take 1 tablet by mouth 2 (Two) Times a Day Before Meals. (Patient taking differently: 40 mg by Per PEG Tube route Daily.), Disp: 60 tablet, Rfl: 0  •  QUEtiapine XR (SEROquel XR) 150 MG 24 hr tablet, Take 150 mg by mouth Every Night., Disp: , Rfl:   •  sertraline (ZOLOFT) 50 MG tablet, 1 tablet by Per G Tube route Daily for 335 doses. (Patient taking differently: 100 mg by Per G Tube route Daily.), Disp: , Rfl:   •  traZODone (DESYREL) 150 MG  "tablet, Take 150 mg by mouth Every Night., Disp: , Rfl:   •  Omega-3 Fatty Acids (FISH OIL) 1000 MG capsule capsule, Take 1 capsule by mouth 3 (Three) Times a Day With Meals., Disp: 90 capsule, Rfl: 6    The following portions of the patient's history were reviewed and updated as appropriate: allergies, current medications, past family history, past medical history, past social history, past surgical history and problem list.    Review of Systems   Constitution: Negative for chills, diaphoresis and fever.   HENT: Negative for congestion, ear pain and sore throat.    Eyes: Negative for blurred vision, pain and redness.   Cardiovascular: Negative for chest pain, leg swelling, orthopnea, palpitations, paroxysmal nocturnal dyspnea and syncope.   Respiratory: Negative for cough, hemoptysis and shortness of breath.    Endocrine: Negative for cold intolerance and heat intolerance.   Hematologic/Lymphatic: Bleeding problem: none known, per pt. Does not bruise/bleed easily.   Skin: Negative for rash.   Musculoskeletal: Negative for arthritis, back pain, joint pain, joint swelling, myalgias and stiffness.   Gastrointestinal: Negative for abdominal pain, constipation, diarrhea, hematemesis, hematochezia, melena, nausea and vomiting.   Genitourinary: Negative for dysuria and hematuria.   Neurological: Negative for dizziness, focal weakness, light-headedness and numbness.   Psychiatric/Behavioral: Negative for depression. The patient is not nervous/anxious.        Objective   Blood pressure 123/74, pulse 73, height 170.2 cm (67\"), weight 82.6 kg (182 lb).    Physical Exam   Constitutional: She is oriented to person, place, and time. She appears well-developed and well-nourished.   WF sitting on wheelchair.   HENT:   Mouth/Throat: Oropharynx is clear and moist.   Eyes: EOM are normal. Pupils are equal, round, and reactive to light.   Neck: Neck supple. No JVD present. No tracheal deviation present. No thyromegaly present. "   Cardiovascular: Normal rate, regular rhythm, S1 normal and S2 normal.  Exam reveals no gallop and no friction rub.    No murmur heard.  Pulmonary/Chest: Effort normal and breath sounds normal. No respiratory distress. She has no wheezes. She has no rales.   Abdominal: Soft. Bowel sounds are normal. She exhibits no mass. There is no tenderness.   Musculoskeletal: Normal range of motion. She exhibits no edema.   Lymphadenopathy:     She has no cervical adenopathy.   Neurological: She is alert and oriented to person, place, and time.   Right sided hemiparesis.   Skin: Skin is warm and dry. No rash noted.   Psychiatric: She has a normal mood and affect.       Procedures    Assessment/Plan      1.  Paroxysmal atrial fibrillation: The patient is currently maintaining junctional rhythm.  Since the patient has a history of atrial fibrillation and had a recent stroke she is at high risk for repeat stroke in the future.  She has a CHADS VASC score of 6.  Since she has been doing well on Eliquis.  At this point would continue.    2.  Hypertension: Patient with a history of hypertension which is currently well controlled.  At this point we'll continue current regimen.    3.  Dyslipidemia: Patient with a history of dyslipidemia with recent lipid panel showing adequate control.  At this point would continue current regimen.       Diagnosis Plan   1. Paroxysmal atrial fibrillation     2. Essential hypertension     3. Mixed hyperlipidemia            Return in about 6 months (around 8/21/2018).    I appreciate the opportunity to participate in this patient's cardiovascular care.    Best Regards    Bautista Melissa

## 2018-02-21 NOTE — PROGRESS NOTES
Outpatient Occupational Therapy Neuro Treatment Note  MARIANO Modi     Patient Name: Sunshine Her  : 1945  MRN: [id number]  Today's Date: 2018       Visit Date: 2018    Patient Active Problem List   Diagnosis   • Generalized weakness   • Acute on chronic renal insufficiency   • Anemia   • Symptomatic bradycardia   • Leukopenia   • Hypothyroid   • Bipolar affective disorder   • Kidney function abnormal   • COPD (chronic obstructive pulmonary disease)   • CVA (cerebral vascular accident)-Right Occipital/Temporal   • Chronic kidney disease (CKD), stage III (moderate)   • Dementia   • Vertigo   • History of stroke   • Iron deficiency anemia   • CKD (chronic kidney disease), stage III   • Vascular dementia   • Hypertension   • Hyperlipidemia   • Tobacco abuse   • Malignant hypertension   • Orthostasis   • Impaired functional mobility, balance, gait, and endurance   • Sepsis   • Paroxysmal atrial fibrillation   • Dyslipidemia        Past Medical History:   Diagnosis Date   • A-fib    • Acute on chronic renal insufficiency 7/15/2016   • Anemia    • Anxiety    • Arthritis    • Arthritis    • Bipolar 1 disorder    • Bradycardia    • COPD (chronic obstructive pulmonary disease)    • Coronary artery disease    • Dementia    • Disease of thyroid gland    • Generalized weakness 2016   • GERD (gastroesophageal reflux disease)    • Heart disease    • History of transfusion    • Hyperlipidemia    • Hypertension    • PAD (peripheral artery disease)    • Stroke     2016, 2017, 2017   • TIA (transient ischemic attack)    • Vertigo         Past Surgical History:   Procedure Laterality Date   • ANGIOPLASTY SUBCLAVIAN ARTERY     • CAROTID ENDARTERECTOMY Bilateral    • COLONOSCOPY     • COLONOSCOPY N/A 2017    Procedure: COLONOSCOPY  CPTCODE:70849;  Surgeon: Suraj Quintanilla III, MD;  Location: [facility];  Service:    • ENDOSCOPY     • ENDOSCOPY N/A 2017    Procedure: ESOPHAGOGASTRODUODENOSCOPY  WITH BIOPSY  CPTCODE:80248;  Surgeon: Suraj Quintanilla III, MD;  Location: [facility] OR;  Service:    • ENDOSCOPY W/ PEG TUBE PLACEMENT N/A 6/28/2017    Procedure: ESOPHAGOGASTRODUODENOSCOPY WITH PERCUTANEOUS ENDOSCOPIC GASTROSTOMY TUBE INSERTION;  Surgeon: Renan Montanez MD;  [facility];  Service:    • EYE SURGERY      bilat cataracts   • RENAL ARTERY BYPASS           Visit Dx:    ICD-10-CM ICD-9-CM   1. Right hemiparesis G81.91 342.90                         OT Assessment/Plan       02/21/18 1604       OT Assessment    Assessment Comments Sunshine tolerated therapy welll with no complaints.  She required rest breaks during session  -     OT Plan    OT Plan Comments continue OT  -       User Key  (r) = Recorded By, (t) = Taken By, (c) = Cosigned By    Initials Name Provider Type     Dana Ludwig OT Occupational Therapist                             OT Exercises       02/21/18 1600          Subjective Comments    Subjective Comments Fransisco reported she is tired today  -      Exercise 1    Exercise Name 1 ROM right UE  -AH      Exercise 2    Exercise Name 2 arm skate right UE  -AH      Exercise 3    Exercise Name 3 overhead pulleys 2 min x3  -AH      Exercise 4    Exercise Name 4 handgripper right hand  strength  -        User Key  (r) = Recorded By, (t) = Taken By, (c) = Cosigned By    Initials Name Provider Type    ZULEYMA Ludwig OT Occupational Therapist                        Time Calculation:   OT Start Time: 1500  OT Stop Time: 1545  OT Time Calculation (min): 45 min     Therapy Charges for Today     Code Description Service Date Service Provider Modifiers Qty    02591855920  OT THERAPEUTIC ACT EA 15 MIN 2/21/2018 Dana Ludwig OT GO 3                    Dana Ludwig OT  2/21/2018

## 2018-02-26 ENCOUNTER — HOSPITAL ENCOUNTER (OUTPATIENT)
Dept: SPEECH THERAPY | Facility: HOSPITAL | Age: 73
Setting detail: THERAPIES SERIES
Discharge: HOME | End: 2018-02-26

## 2018-02-26 ENCOUNTER — HOSPITAL ENCOUNTER (OUTPATIENT)
Dept: OCCUPATIONAL THERAPY | Facility: HOSPITAL | Age: 73
Setting detail: THERAPIES SERIES
Discharge: HOME | End: 2018-02-26

## 2018-02-26 ENCOUNTER — HOSPITAL ENCOUNTER (EMERGENCY)
Facility: HOSPITAL | Age: 73
Discharge: PSYCHIATRIC OR SUBSTANCE USE FACILITY | End: 2018-02-27
Attending: FAMILY MEDICINE | Admitting: FAMILY MEDICINE

## 2018-02-26 ENCOUNTER — APPOINTMENT (OUTPATIENT)
Dept: CT IMAGING | Facility: HOSPITAL | Age: 73
End: 2018-02-26

## 2018-02-26 ENCOUNTER — HOSPITAL ENCOUNTER (OUTPATIENT)
Dept: PHYSICAL THERAPY | Facility: HOSPITAL | Age: 73
Setting detail: THERAPIES SERIES
Discharge: HOME | End: 2018-02-26

## 2018-02-26 DIAGNOSIS — G81.91 RIGHT HEMIPARESIS (HCC): Primary | ICD-10-CM

## 2018-02-26 DIAGNOSIS — R45.851 SUICIDAL IDEATIONS: ICD-10-CM

## 2018-02-26 DIAGNOSIS — R26.81 UNSTEADY GAIT: Primary | ICD-10-CM

## 2018-02-26 DIAGNOSIS — D64.9 ANEMIA, UNSPECIFIED TYPE: Primary | ICD-10-CM

## 2018-02-26 DIAGNOSIS — K92.1 HEMATOCHEZIA: ICD-10-CM

## 2018-02-26 DIAGNOSIS — I63.9 CEREBROVASCULAR ACCIDENT (CVA), UNSPECIFIED MECHANISM (HCC): Primary | ICD-10-CM

## 2018-02-26 DIAGNOSIS — G81.91 RIGHT HEMIPARESIS (HCC): ICD-10-CM

## 2018-02-26 LAB
6-ACETYL MORPHINE: NEGATIVE
ABO GROUP BLD: NORMAL
ALBUMIN SERPL-MCNC: 4.6 G/DL (ref 3.4–4.8)
ALBUMIN/GLOB SERPL: 1.5 G/DL (ref 1.5–2.5)
ALP SERPL-CCNC: 135 U/L (ref 35–104)
ALT SERPL W P-5'-P-CCNC: 34 U/L (ref 10–36)
AMPHET+METHAMPHET UR QL: NEGATIVE
ANION GAP SERPL CALCULATED.3IONS-SCNC: 8.4 MMOL/L (ref 3.6–11.2)
ANISOCYTOSIS BLD QL: NORMAL
AST SERPL-CCNC: 28 U/L (ref 10–30)
BACTERIA UR QL AUTO: NORMAL /HPF
BARBITURATES UR QL SCN: NEGATIVE
BASOPHILS # BLD AUTO: 0.04 10*3/MM3 (ref 0–0.3)
BASOPHILS NFR BLD AUTO: 0.5 % (ref 0–2)
BENZODIAZ UR QL SCN: NEGATIVE
BILIRUB SERPL-MCNC: 0.2 MG/DL (ref 0.2–1.8)
BILIRUB UR QL STRIP: NEGATIVE
BLD GP AB SCN SERPL QL: NEGATIVE
BUN BLD-MCNC: 29 MG/DL (ref 7–21)
BUN/CREAT SERPL: 19.9 (ref 7–25)
BUPRENORPHINE SERPL-MCNC: NEGATIVE NG/ML
CALCIUM SPEC-SCNC: 8.9 MG/DL (ref 7.7–10)
CANNABINOIDS SERPL QL: NEGATIVE
CHLORIDE SERPL-SCNC: 105 MMOL/L (ref 99–112)
CLARITY UR: CLEAR
CO2 SERPL-SCNC: 24.6 MMOL/L (ref 24.3–31.9)
COCAINE UR QL: NEGATIVE
COLOR UR: YELLOW
CREAT BLD-MCNC: 1.46 MG/DL (ref 0.43–1.29)
DACRYOCYTES BLD QL SMEAR: NORMAL
DEPRECATED RDW RBC AUTO: 63.8 FL (ref 37–54)
DEVELOPER EXPIRATION DATE: ABNORMAL
DEVELOPER LOT NUMBER: ABNORMAL
ELLIPTOCYTES BLD QL SMEAR: NORMAL
EOSINOPHIL # BLD AUTO: 0.27 10*3/MM3 (ref 0–0.7)
EOSINOPHIL NFR BLD AUTO: 3.2 % (ref 0–7)
ERYTHROCYTE [DISTWIDTH] IN BLOOD BY AUTOMATED COUNT: 18.8 % (ref 11.5–14.5)
ETHANOL BLD-MCNC: <10 MG/DL
ETHANOL UR QL: <0.01 %
EXPIRATION DATE: ABNORMAL
FECAL OCCULT BLOOD SCREEN, POC: POSITIVE
GFR SERPL CREATININE-BSD FRML MDRD: 35 ML/MIN/1.73
GLOBULIN UR ELPH-MCNC: 3 GM/DL
GLUCOSE BLD-MCNC: 132 MG/DL (ref 70–110)
GLUCOSE UR STRIP-MCNC: NEGATIVE MG/DL
HCT VFR BLD AUTO: 22.9 % (ref 37–47)
HGB BLD-MCNC: 6.4 G/DL (ref 12–16)
HGB UR QL STRIP.AUTO: NEGATIVE
HYALINE CASTS UR QL AUTO: NORMAL /LPF
HYPOCHROMIA BLD QL: NORMAL
IMM GRANULOCYTES # BLD: 0.13 10*3/MM3 (ref 0–0.03)
IMM GRANULOCYTES NFR BLD: 1.5 % (ref 0–0.5)
KETONES UR QL STRIP: NEGATIVE
LEUKOCYTE ESTERASE UR QL STRIP.AUTO: ABNORMAL
LYMPHOCYTES # BLD AUTO: 1.27 10*3/MM3 (ref 1–3)
LYMPHOCYTES NFR BLD AUTO: 14.8 % (ref 16–46)
Lab: ABNORMAL
MCH RBC QN AUTO: 25.8 PG (ref 27–33)
MCHC RBC AUTO-ENTMCNC: 27.9 G/DL (ref 33–37)
MCV RBC AUTO: 92.3 FL (ref 80–94)
METHADONE UR QL SCN: NEGATIVE
MONOCYTES # BLD AUTO: 0.55 10*3/MM3 (ref 0.1–0.9)
MONOCYTES NFR BLD AUTO: 6.4 % (ref 0–12)
NEGATIVE CONTROL: NEGATIVE
NEUTROPHILS # BLD AUTO: 6.3 10*3/MM3 (ref 1.4–6.5)
NEUTROPHILS NFR BLD AUTO: 73.6 % (ref 40–75)
NITRITE UR QL STRIP: NEGATIVE
OPIATES UR QL: NEGATIVE
OSMOLALITY SERPL CALC.SUM OF ELEC: 283.4 MOSM/KG (ref 273–305)
OXYCODONE UR QL SCN: NEGATIVE
PCP UR QL SCN: NEGATIVE
PH UR STRIP.AUTO: 6 [PH] (ref 5–8)
PLAT MORPH BLD: NORMAL
PLATELET # BLD AUTO: 216 10*3/MM3 (ref 130–400)
PMV BLD AUTO: 9.3 FL (ref 6–10)
POSITIVE CONTROL: POSITIVE
POTASSIUM BLD-SCNC: 4.1 MMOL/L (ref 3.5–5.3)
PROT SERPL-MCNC: 7.6 G/DL (ref 6–8)
PROT UR QL STRIP: ABNORMAL
RBC # BLD AUTO: 2.48 10*6/MM3 (ref 4.2–5.4)
RBC # UR: NORMAL /HPF
REF LAB TEST METHOD: NORMAL
RH BLD: POSITIVE
SODIUM BLD-SCNC: 138 MMOL/L (ref 135–153)
SP GR UR STRIP: 1.01 (ref 1–1.03)
SQUAMOUS #/AREA URNS HPF: NORMAL /HPF
UROBILINOGEN UR QL STRIP: ABNORMAL
WBC NRBC COR # BLD: 8.56 10*3/MM3 (ref 4.5–12.5)
WBC UR QL AUTO: NORMAL /HPF

## 2018-02-26 PROCEDURE — 92507 TX SP LANG VOICE COMM INDIV: CPT | Performed by: SPEECH-LANGUAGE PATHOLOGIST

## 2018-02-26 PROCEDURE — 85025 COMPLETE CBC W/AUTO DIFF WBC: CPT | Performed by: PHYSICIAN ASSISTANT

## 2018-02-26 PROCEDURE — P9016 RBC LEUKOCYTES REDUCED: HCPCS

## 2018-02-26 PROCEDURE — 80307 DRUG TEST PRSMV CHEM ANLYZR: CPT | Performed by: PHYSICIAN ASSISTANT

## 2018-02-26 PROCEDURE — 74176 CT ABD & PELVIS W/O CONTRAST: CPT | Performed by: RADIOLOGY

## 2018-02-26 PROCEDURE — 36415 COLL VENOUS BLD VENIPUNCTURE: CPT

## 2018-02-26 PROCEDURE — 97112 NEUROMUSCULAR REEDUCATION: CPT

## 2018-02-26 PROCEDURE — 86850 RBC ANTIBODY SCREEN: CPT | Performed by: PHYSICIAN ASSISTANT

## 2018-02-26 PROCEDURE — 85007 BL SMEAR W/DIFF WBC COUNT: CPT | Performed by: PHYSICIAN ASSISTANT

## 2018-02-26 PROCEDURE — 99285 EMERGENCY DEPT VISIT HI MDM: CPT

## 2018-02-26 PROCEDURE — 86920 COMPATIBILITY TEST SPIN: CPT

## 2018-02-26 PROCEDURE — 86900 BLOOD TYPING SEROLOGIC ABO: CPT | Performed by: PHYSICIAN ASSISTANT

## 2018-02-26 PROCEDURE — 97530 THERAPEUTIC ACTIVITIES: CPT | Performed by: OCCUPATIONAL THERAPIST

## 2018-02-26 PROCEDURE — 86900 BLOOD TYPING SEROLOGIC ABO: CPT

## 2018-02-26 PROCEDURE — 36430 TRANSFUSION BLD/BLD COMPNT: CPT

## 2018-02-26 PROCEDURE — 97110 THERAPEUTIC EXERCISES: CPT

## 2018-02-26 PROCEDURE — 74176 CT ABD & PELVIS W/O CONTRAST: CPT

## 2018-02-26 PROCEDURE — 86901 BLOOD TYPING SEROLOGIC RH(D): CPT | Performed by: PHYSICIAN ASSISTANT

## 2018-02-26 PROCEDURE — 81001 URINALYSIS AUTO W/SCOPE: CPT | Performed by: PHYSICIAN ASSISTANT

## 2018-02-26 PROCEDURE — 80053 COMPREHEN METABOLIC PANEL: CPT | Performed by: PHYSICIAN ASSISTANT

## 2018-02-26 PROCEDURE — 82270 OCCULT BLOOD FECES: CPT | Performed by: PHYSICIAN ASSISTANT

## 2018-02-26 RX ORDER — SODIUM CHLORIDE 9 MG/ML
INJECTION, SOLUTION INTRAVENOUS
Status: COMPLETED
Start: 2018-02-26 | End: 2018-02-27

## 2018-02-26 RX ORDER — SODIUM CHLORIDE 0.9 % (FLUSH) 0.9 %
10 SYRINGE (ML) INJECTION AS NEEDED
Status: DISCONTINUED | OUTPATIENT
Start: 2018-02-26 | End: 2018-02-27 | Stop reason: HOSPADM

## 2018-02-26 RX ADMIN — SODIUM CHLORIDE 250 ML: 0.9 INJECTION, SOLUTION INTRAVENOUS at 23:49

## 2018-02-26 NOTE — PROGRESS NOTES
Outpatient Speech Language Pathology   Adult Speech Language Cognitive Progress Note  MARIANO Modi     Patient Name: Sunshine Her  : 1945  MRN: [id number]  Today's Date: 2018         Visit Date: 2018   Patient Active Problem List   Diagnosis   • Generalized weakness   • Acute on chronic renal insufficiency   • Anemia   • Symptomatic bradycardia   • Leukopenia   • Hypothyroid   • Bipolar affective disorder   • Kidney function abnormal   • COPD (chronic obstructive pulmonary disease)   • CVA (cerebral vascular accident)-Right Occipital/Temporal   • Chronic kidney disease (CKD), stage III (moderate)   • Dementia   • Vertigo   • History of stroke   • Iron deficiency anemia   • CKD (chronic kidney disease), stage III   • Vascular dementia   • Hypertension   • Hyperlipidemia   • Tobacco abuse   • Malignant hypertension   • Orthostasis   • Impaired functional mobility, balance, gait, and endurance   • Sepsis   • Paroxysmal atrial fibrillation   • Dyslipidemia          Visit Dx:    ICD-10-CM ICD-9-CM   1. Cerebrovascular accident (CVA), unspecified mechanism I63.9 434.91       Long term goals:  1. Patient will improve expressive language skills to allow for maximal functional communication and independence in adls.          *pt progressing as expected.     2. Patient will improve receptive language skills to allow for maximal functional communication and independence in adls.   *pt progressing as expected.           Short term goals:  1. Patient will produce rote tasks w/ max cues at 90% across 3 consecutive sessions.   *Pt produces rote tasks w/ max cues at 85% accuracy.       2.  Patient will produce personal identifiers w/ max cues/models 3/5 opp over 3 consecutive sessions.   *Pt produces personal identifiers 3/5 opp mod cues.       3.  Patientwill imitate confrontation naming of simple, common objects w/ max cues and model 80% in gross approximation across 3 consecutive sessions.   *Pt imitates  confrontation naming of simple, common objects w/ mod cues and verbal model 70% in gross approximation.      4.  Patient will match word to object 80% across 3 consecutive sessions w/ max cues.   *Pt matches word to object 75% this session w/ min-mod cues.      5.  Patient will respond to moderate y/n questions at 80% w/ max cues across 3 consecutive sessions.   *Pt responds to moderate y/n questions at 75% w/ max cues.      6.  Patient will follow simple functional directives in adls 2-3 steps, w/max cues across 3 consecutive sessions.   *Pt follows 1 & 2 step simple directives w/ mod cues.      7.  Patient will copy/trace her name common words at 80% w/ max cues across 3 consecutive sessions.  *pt copies name, common one-syllable words w/ L hand w/ 60% accuracy and mod cues.        8. Patient will repeat at single syllable level in gross approximation w/ max cues w/ 50% acc across 3 consecutive sessions  *Pt repeats at single syllable level in gross approximation 60% accuracy w/ mod cues.     9. Patient will expressively read names of common objects at 80% accuracy w/ mod cues across 3 consecutive sessions.    *pt reads names of common objects with 60% acc w/ mod cues.    *Further goals may be added pending progress towards this POC    Educated pt on progress toward goals, generalization ideas with her acknowledging understanding and agreement.      Thank you-  Leah Aparicio M.A., CCC-SLP          OP SLP Education       02/26/18 1384    Education    Education Comments d/w pt progress toward goals with pt verbalizing understanding and agreement.  -      User Key  (r) = Recorded By, (t) = Taken By, (c) = Cosigned By    Initials Name Effective Dates     Leah Aparicio MA,CCC-SLP 12/20/17 -              Time Calculation:   SLP Start Time: 1320  SLP Stop Time: 1400  SLP Time Calculation (min): 40 min  SLP Non-Billable Time (min): 30 min    Therapy Charges for Today     Code Description Service Date Service  Provider Modifiers Qty    48705281521 HC ST TREATMENT SPEECH 3 2/26/2018 Leah Aparicio MA,CCC-SLP 59, GN, KX 1    02992776775 HC ST CARE PLAN 15 MIN 2/26/2018 Leah Aparicio MA,CCC-SLP GN 2                   Leah Aparicio MA,CCC-SLP  2/26/2018

## 2018-02-26 NOTE — PROGRESS NOTES
Outpatient Occupational Therapy Neuro Treatment Note  MARIANO Modi     Patient Name: Sunshine Her  : 1945  MRN: [id number]  Today's Date: 2018       Visit Date: 2018    Patient Active Problem List   Diagnosis   • Generalized weakness   • Acute on chronic renal insufficiency   • Anemia   • Symptomatic bradycardia   • Leukopenia   • Hypothyroid   • Bipolar affective disorder   • Kidney function abnormal   • COPD (chronic obstructive pulmonary disease)   • CVA (cerebral vascular accident)-Right Occipital/Temporal   • Chronic kidney disease (CKD), stage III (moderate)   • Dementia   • Vertigo   • History of stroke   • Iron deficiency anemia   • CKD (chronic kidney disease), stage III   • Vascular dementia   • Hypertension   • Hyperlipidemia   • Tobacco abuse   • Malignant hypertension   • Orthostasis   • Impaired functional mobility, balance, gait, and endurance   • Sepsis   • Paroxysmal atrial fibrillation   • Dyslipidemia        Past Medical History:   Diagnosis Date   • A-fib    • Acute on chronic renal insufficiency 7/15/2016   • Anemia    • Anxiety    • Arthritis    • Arthritis    • Bipolar 1 disorder    • Bradycardia    • COPD (chronic obstructive pulmonary disease)    • Coronary artery disease    • Dementia    • Disease of thyroid gland    • Generalized weakness 2016   • GERD (gastroesophageal reflux disease)    • Heart disease    • History of transfusion    • Hyperlipidemia    • Hypertension    • PAD (peripheral artery disease)    • Stroke     2016, 2017, 2017   • TIA (transient ischemic attack)    • Vertigo         Past Surgical History:   Procedure Laterality Date   • ANGIOPLASTY SUBCLAVIAN ARTERY     • CAROTID ENDARTERECTOMY Bilateral    • COLONOSCOPY     • COLONOSCOPY N/A 2017    Procedure: COLONOSCOPY  CPTCODE:56997;  Surgeon: Suraj Quintanilla III, MD;  Location: [facility];  Service:    • ENDOSCOPY     • ENDOSCOPY N/A 2017    Procedure: ESOPHAGOGASTRODUODENOSCOPY  WITH BIOPSY  CPTCODE:42403;  Surgeon: Suraj Quintanilla III, MD;  Location: [facility] OR;  Service:    • ENDOSCOPY W/ PEG TUBE PLACEMENT N/A 6/28/2017    Procedure: ESOPHAGOGASTRODUODENOSCOPY WITH PERCUTANEOUS ENDOSCOPIC GASTROSTOMY TUBE INSERTION;  Surgeon: Renan Montanez MD;  Location: [facility] OR;  Service:    • EYE SURGERY      bilat cataracts   • RENAL ARTERY BYPASS           Visit Dx:    ICD-10-CM ICD-9-CM   1. Right hemiparesis G81.91 342.90                         OT Assessment/Plan       02/26/18 1602       OT Assessment    Assessment Comments Sunshine continues to demonstrate hand over hand for completing active grasp and release tasks.  She continues to require numerous rest breaks duiring session with activity  -AH     OT Plan    OT Plan Comments continue OT  -       User Key  (r) = Recorded By, (t) = Taken By, (c) = Cosigned By    Initials Name Provider Type     Dana Ludwig OT Occupational Therapist                             OT Exercises       02/26/18 1600          Exercise 1    Exercise Name 1 ROM right UE  -AH      Exercise 2    Exercise Name 2 arm skate right UE  -AH      Exercise 3    Exercise Name 3 overhead pulleys   -AH      Exercise 4    Exercise Name 4 handgripper right hand  -AH      Exercise 5    Exercise Name 5  massage and ROM right hand and wrist  -AH        User Key  (r) = Recorded By, (t) = Taken By, (c) = Cosigned By    Initials Name Provider Type     Dana Ludwig OT Occupational Therapist                        Time Calculation:   OT Start Time: 1400  OT Stop Time: 1455  OT Time Calculation (min): 55 min     Therapy Charges for Today     Code Description Service Date Service Provider Modifiers Qty    [id number]  OT THERAPEUTIC ACT EA 15 MIN 2/26/2018 Dana Ludwig OT GO 4                    Dana Ludwig OT  2/26/2018

## 2018-02-26 NOTE — THERAPY TREATMENT NOTE
Outpatient Physical Therapy Neuro Treatment Note   Rian     Patient Name: Sunshine Her  : 1945  MRN: [id number]  Today's Date: 2018      Visit Date: 2018    Visit Dx:    ICD-10-CM ICD-9-CM   1. Unsteady gait R26.81 781.2   2. Right hemiparesis G81.91 342.90       Patient Active Problem List   Diagnosis   • Generalized weakness   • Acute on chronic renal insufficiency   • Anemia   • Symptomatic bradycardia   • Leukopenia   • Hypothyroid   • Bipolar affective disorder   • Kidney function abnormal   • COPD (chronic obstructive pulmonary disease)   • CVA (cerebral vascular accident)-Right Occipital/Temporal   • Chronic kidney disease (CKD), stage III (moderate)   • Dementia   • Vertigo   • History of stroke   • Iron deficiency anemia   • CKD (chronic kidney disease), stage III   • Vascular dementia   • Hypertension   • Hyperlipidemia   • Tobacco abuse   • Malignant hypertension   • Orthostasis   • Impaired functional mobility, balance, gait, and endurance   • Sepsis   • Paroxysmal atrial fibrillation   • Dyslipidemia                 PT Neuro       18 1500          Subjective Comments    Subjective Comments Patient nods her head that she is tired today.  Pt agreeable to continue with therapy session.    -KAMINI      Subjective Pain    Able to rate subjective pain? yes  -KAMINI      Pre-Treatment Pain Level 0  -KAMINI      Post-Treatment Pain Level 0  -KAMINI        User Key  (r) = Recorded By, (t) = Taken By, (c) = Cosigned By    Initials Name Provider Type    KAMINI Stock PTA Physical Therapy Assistant                        PT Assessment/Plan       18 1614       PT Assessment    Assessment Comments Patient began today's tx w/ therex for improved functional mobility and bilateral LE strength f/b balance, coordination activities as per flow sheet.  Pt received rest breaks as needed secondary to fatigue.  Pt and family agreeable to discharged w/ home program at next therapy sesssion.   "Pt continues to progress as tolerated.  BP assessed post tx w/ reading of 132/68.  -KAMINI     PT Plan    PT Plan Comments Continue with PT's POC and will progress tx as tolerated by patient.   -KAMINI       User Key  (r) = Recorded By, (t) = Taken By, (c) = Cosigned By    Initials Name Provider Type    KAMINI Stock PTA Physical Therapy Assistant                     Exercises       02/26/18 1500          Subjective Comments    Subjective Comments Patient nods her head that she is tired today.  Pt agreeable to continue with therapy session.    -KAMINI      Subjective Pain    Able to rate subjective pain? yes  -KAMINI      Pre-Treatment Pain Level 0  -KAMINI      Post-Treatment Pain Level 0  -KAMINI      Exercise 1    Exercise Name 1 ball squeeze 15x2, LAQ 15x2 (2#), seated march 15x2, knee flex w/ tband (green) 15x2, hip abd w/tband (green) 15x2, St. march 10x2  -KAMINI      Cueing 1 Verbal;Tactile;Demo  -KAMINI      Time (Minutes) 1 20 min  -KAMINI      Exercise 2    Exercise Name 2 Neuro:  alt foot placement on 4\" step x10, static/ dynamic standing balance on foam w/ cone reach  -KAMINI      Cueing 2 Verbal;Tactile;Demo  -KAMINI      Time (Minutes) 2 10 min  -KAMINI      Exercise 3    Exercise Name 3 Gait:  70' w/ RW + 70' x 2 w/ SBQC, CGA  -KAMINI      Cueing 3 Verbal;Tactile;Demo  -KAMINI        User Key  (r) = Recorded By, (t) = Taken By, (c) = Cosigned By    Initials Name Provider Type    KAMINI Stock PTA Physical Therapy Assistant                                           Time Calculation:   Start Time: 1500  Stop Time: 1548  Time Calculation (min): 48 min     Therapy Charges for Today     Code Description Service Date Service Provider Modifiers Qty    37153264674 HC PT THER PROC EA 15 MIN 2/26/2018 Sherlyn Stock PTA 59, GP 1    99424474580 HC PT NEUROMUSC RE EDUCATION EA 15 MIN 2/26/2018 Sherlyn Stock PTA 59, GP 1    24685463921 HC PT THER SUPP EA 15 MIN 2/26/2018 Sherlyn Stock PTA 58, GP 1            "         Sherlyn Yusuf. Dayami, PTA  2/26/2018

## 2018-02-27 LAB
ABO + RH BLD: NORMAL
ABO + RH BLD: NORMAL
BH BB BLOOD EXPIRATION DATE: NORMAL
BH BB BLOOD EXPIRATION DATE: NORMAL
BH BB BLOOD TYPE BARCODE: 5100
BH BB BLOOD TYPE BARCODE: 5100
BH BB DISPENSE STATUS: NORMAL
BH BB DISPENSE STATUS: NORMAL
BH BB PRODUCT CODE: NORMAL
BH BB PRODUCT CODE: NORMAL
BH BB UNIT NUMBER: NORMAL
BH BB UNIT NUMBER: NORMAL
CROSSMATCH INTERPRETATION: NORMAL
CROSSMATCH INTERPRETATION: NORMAL
UNIT  ABO: NORMAL
UNIT  ABO: NORMAL
UNIT  RH: NORMAL
UNIT  RH: NORMAL

## 2018-02-27 PROCEDURE — 36430 TRANSFUSION BLD/BLD COMPNT: CPT

## 2018-02-27 PROCEDURE — 86900 BLOOD TYPING SEROLOGIC ABO: CPT

## 2018-02-27 PROCEDURE — P9016 RBC LEUKOCYTES REDUCED: HCPCS

## 2018-02-28 VITALS
HEART RATE: 84 BPM | BODY MASS INDEX: 28.25 KG/M2 | OXYGEN SATURATION: 95 % | TEMPERATURE: 97.8 F | SYSTOLIC BLOOD PRESSURE: 170 MMHG | DIASTOLIC BLOOD PRESSURE: 74 MMHG | HEIGHT: 67 IN | WEIGHT: 180 LBS | RESPIRATION RATE: 18 BRPM

## 2018-03-07 ENCOUNTER — APPOINTMENT (OUTPATIENT)
Dept: SPEECH THERAPY | Facility: HOSPITAL | Age: 73
End: 2018-03-07

## 2018-03-16 ENCOUNTER — DOCUMENTATION (OUTPATIENT)
Dept: PHYSICAL THERAPY | Facility: HOSPITAL | Age: 73
End: 2018-03-16

## 2018-03-16 DIAGNOSIS — G81.94 LEFT HEMIPARESIS (HCC): ICD-10-CM

## 2018-03-16 DIAGNOSIS — G81.91 RIGHT HEMIPARESIS (HCC): ICD-10-CM

## 2018-03-16 DIAGNOSIS — R26.81 UNSTEADY GAIT: Primary | ICD-10-CM

## 2018-03-16 DIAGNOSIS — Z86.73 HISTORY OF STROKE: ICD-10-CM

## 2018-03-20 PROCEDURE — G9159 LANG COMP CURRENT STATUS: HCPCS | Performed by: SPEECH-LANGUAGE PATHOLOGIST

## 2018-08-23 NOTE — DISCHARGE SUMMARY
[facility] Medicine Services  DISCHARGE SUMMARY       Date of Admission: 2/9/2017  Date of Discharge:  2/12/2017  Primary Care Physician: MONTANA Montez    Discharge Diagnoses:  Active Hospital Problems (** Indicates Principal Problem)    Diagnosis Date Noted   • **Iron deficiency anemia [D50.9] 02/09/2017   • Malignant hypertension [I10] 02/10/2017   • Orthostasis [I95.1] 02/10/2017   • Impaired functional mobility, balance, gait, and endurance [Z74.09] 02/10/2017   • CKD (chronic kidney disease), stage III [N18.3] 02/09/2017   • Vascular dementia [F01.50] 02/09/2017   • Hypertension [I10] 02/09/2017   • Hyperlipidemia [E78.5] 02/09/2017   • Tobacco abuse [Z72.0] 02/09/2017   • History of stroke [Z86.73] 11/07/2016   • Hypothyroid [E03.9] 10/04/2016   • COPD (chronic obstructive pulmonary disease) [J44.9] 10/04/2016   • Bipolar affective disorder [F31.9] 10/04/2016   • Generalized weakness [R53.1] 07/14/2016      Resolved Hospital Problems    Diagnosis Date Noted Date Resolved   No resolved problems to display.       Presenting Problem/History of Present Illness  CVA (cerebral vascular accident) [I63.9]  Impaired functional mobility, balance, gait, and endurance [Z74.09]     Chief Complaint on Day of Discharge: weakness    Hospital Course  Patient is a 71 y.o. female with past medical history significant for ischemic CVA, hypertension, hyperlipidemia, vascular dementia, chronic kidney disease stage III presented to [facility] on 2/9/2017 with complaints of increased weakness.  Patient does have baseline generalized weakness secondary to CVA from October 2016.  Patient had episode while ambulating in to PCP office day of admission where she grabbed her face and fell against a wall complaining of increased weakness and not feeling well.  Patient was transferred to [facility] for higher level of care and neurology evaluation.  Patient has had increasing weakness  for several days with shortness of breath on exertion prior to arrival.  Patient was admitted to [facility] for CVA rule out.  Stroke workup was completed and neurology was consulted.  No significant findings on MRI, carotid duplex.  EEG was also completed with no findings for epileptic activity.  Patient was found to have accelerated hypertension as well as normocytic anemia.    CVA was ruled out this admission and episode patient had prior to arrival is likely related to accelerated hypertension incident.  Patient did have drop in hemoglobin to 6.9 and was transfused 2 units PRBCs area patient did have good response with hemoglobin of 10.1 today.  Last colonoscopy was grossly 4 years ago in [place] and EGD/colonoscopy have been set up with Dr. Warner at [facility] on 2/24/2017.  His office will call 2 days prior to procedure for further instructions.  Patient was transfused to be iron this admission as well.  We'll transition her to ferrous sulfate twice a day with meals at discharge.  She also has been on Nexium daily and will transition her to Protonix twice a day for the next month.  We will have Dr. Warner guide continuation of prescription based on procedure findings later this month.  Patient will have follow-up with primary care provider at the beginning of March, following GI procedure.  We are holding her previously prescribed Plavix due to severe anemia.  She will continue on aspirin at discharge.  PCP can decide if restarting Plavix is warranted at follow-up appointment.    Patient's blood pressure has been well-controlled on medications, as they have been restarted.  Patient has had issues each morning with orthostatic hypotension.  Patient appears to be asymptomatic with these episodes.  However this has been monitored daily.  Patient has been counseled and instructed thoroughly to have slow position changes and how to manage, if she becomes symptomatic.  Wellbutrin has not been started secondary to  accelerated hypertension earlier this admission.  We will have patient follow-up with primary care provider in regards to this.  Patient will be discharged home in the care of her daughter today.  She will have follow-up at GI and primary care later this month.  She does not need neurology follow-up based on this admission.    Procedures Performed         Consults:   Consults     Date and Time Order Name Status Description    2/9/2017 2233 Inpatient consult to Neurology Completed           Pertinent Test Results:  Results for AYLIN ATKINS (MRN [id number]) as of 2/12/2017 10:14   Ref. Range 2/10/2017 06:34   Total Cholesterol Latest Ref Range: 0 - 200 mg/dL 160   HDL Cholesterol Latest Ref Range: 40 - 60 mg/dL 42   LDL Cholesterol  Latest Ref Range: 0 - 130 mg/dL 71   Triglycerides Latest Ref Range: 0 - 150 mg/dL 236 (H)   Results for AYLIN ATKINS (MRN [id number]) as of 2/12/2017 10:14   Ref. Range 2/11/2017 07:03   Glucose Latest Ref Range: 70 - 100 mg/dL 99   Sodium Latest Ref Range: 132 - 146 mmol/L 138   Potassium Latest Ref Range: 3.5 - 5.5 mmol/L 4.4   CO2 Latest Ref Range: 20.0 - 31.0 mmol/L 26.0   Chloride Latest Ref Range: 99 - 109 mmol/L 108   Anion Gap Latest Ref Range: 3.0 - 11.0 mmol/L 4.0   Creatinine Latest Ref Range: 0.60 - 1.30 mg/dL 1.50 (H)   BUN Latest Ref Range: 9 - 23 mg/dL 24 (H)   BUN/Creatinine Ratio Latest Ref Range: 7.0 - 25.0  16.0   Calcium Latest Ref Range: 8.7 - 10.4 mg/dL 9.4   eGFR Non African Amer Latest Ref Range: >60 mL/min/1.73 34 (L)   Results for AYLIN ATKINS (MRN [id number]) as of 2/12/2017 10:14   Ref. Range 2/10/2017 06:34   Hemoglobin A1C Latest Ref Range: 4.80 - 5.60 % 5.50   TSH Baseline Latest Ref Range: 0.350 - 5.350 mIU/mL 3.107   Results for AYLIN ATKINS (MRN [id number]) as of 2/12/2017 10:14   Ref. Range 2/9/2017 23:49   Iron Latest Ref Range: 50 - 175 mcg/dL 20 (L)   Ferritin Latest Ref Range: 10.00 - 291.00 ng/mL 6.00 (L)   Iron Saturation Latest Ref Range: 15 -  50 % 5 (L)   TIBC Latest Ref Range: 250 - 450 mcg/dL 428   Folate Latest Ref Range: 3.20 - 20.00 ng/mL >24.00 (H)   Vitamin B-12 Latest Ref Range: 211 - 911 pg/mL 1105 (H)   Results for AYLIN ATKINS (MRN [id number]) as of 2/12/2017 10:14   Ref. Range 2/11/2017 07:03 2/12/2017 05:40   WBC Latest Ref Range: 3.50 - 10.80 10*3/mm3 4.70    RBC Latest Ref Range: 3.89 - 5.14 10*6/mm3 2.98 (L)    Hemoglobin Latest Ref Range: 11.5 - 15.5 g/dL 6.9 (L) 10.0 (L)   Hematocrit Latest Ref Range: 34.5 - 44.0 % 24.4 (L) 33.1 (L)   RDW Latest Ref Range: 11.3 - 14.5 % 16.7 (H)    MCV Latest Ref Range: 80.0 - 99.0 fL 81.9    MCH Latest Ref Range: 27.0 - 31.0 pg 23.2 (L)    MCHC Latest Ref Range: 32.0 - 36.0 g/dL 28.3 (L)    MPV Latest Ref Range: 6.0 - 12.0 fL 8.4    Platelets Latest Ref Range: 150 - 450 10*3/mm3 180    RDW-SD Latest Ref Range: 37.0 - 54.0 fl 50.5    XR CHEST 1 VW-      REASON FOR EXAM: weakness      The chest is compared with earlier chest done 10/1/2016      FINDINGS: The cardiac silhouette and mediastinum are normal in size and  configuration. The lungs are both well-aerated and clear. There are no  pleural effusions in the lung bases. No consolidated areas of pneumonia  are identified. The bones and soft tissues are unremarkable.      IMPRESSION:  Stable chest. No intrathoracic disease is demonstrated    MR Head Without Intravenous Contrast.     CLINICAL HISTORY:  71 years old, female; Signs and symptoms; Weakness, extremity; Bilateral;   Patient HX: History of ischemic CVA, HTN, hyperlipidemia, vascular dementia,   ica disease evaluate for CVA, generalized weakness; Additional info: Rule out   CVA     TECHNIQUE:  Magnetic resonance images of the head/brain without intravenous contrast in   multiple planes.     COMPARISON:  MRI BRAIN WO CONTRAST 10/4/2016 5:28:57 AM     FINDINGS:     Brain: Chronic left frontal and right occipital infarctions. Minimal   associated high signal on diffusion weighted imaging in the  right occipital   lobe is felt to most likely largely represent T2 shine through. There are   periventricular and subcortical areas of flair high signal consistent with   chronic small vessel ischemic disease. No evidence of hemorrhage or mass   effect. The cortical sulci are enlarged consistent with cerebral atrophy.     Ventricles: The ventricles are mildly enlarged.     Bones/joints: Unremarkable.     Sinuses: Unremarkable as visualized.     Mastoid air cells: Unremarkable as visualized. No mastoid effusion.     Orbits: Unremarkable as visualized.     IMPRESSION:     Chronic infarctions as above. Minimal associated diffusion weighted high signal   in the right occipital region is most likely largely related to T2 shine   through. Minimal superimposed acute to subacute ischemic change cannot be   entirely excluded.     Sunshine Her   Acquired echocardiogram 2D complete   Order# [id number]    Reading physician: Onesimo Stewart MD   Ordering physician: MON[place]NA Sinclair   Study date: 2/10/17         Patient Information      Name MRN Description     Sunshine Her [id number] 71 y.o. Female       Interpretation Summary      · All left ventricular wall segments contract normally.  · Mild mitral valve regurgitation is present  · Left ventricular function is normal. Estimated EF = 70%.  · saline study positive for PFO           Patient Height & Weight      Kun Guevara MD 2/10/2017 Routine         Narrative & Impression           Electroencephalogram Report     Referring: Kun Guevara MD     Indications:   Weakness     EEG Description  With the patient awake background shows 8-9/s waves with amplitudes of 20-30 µV. These are maximum posteriorly and blocking with eye opening.     There is paroxysmal of 4-5/s waves noted with amplitudes of 30-40 µV intermittently and bursts of the 3-4 seconds in the left temporal area.  No drowsiness or sleep or sleep is seen.           Classification  Dysrrhythmia grade  "2 paroxysmal delta left temporal     Interpretation  Minor nonspecific changes focal in the left hemisphere. No evidence of active brain disease is seen.     No epileptic activity is identified        Duplex scan of extracranial arteries using B-mode, color flow, and/or spectral Doppler; bilateral   Order# [id number]    Reading physician: Francisco J Holcomb MD   Ordering physician: MONTANA Sinclair   Study date: 2/10/17         Patient Information      Name MRN Description     Sunshine Her [id number] 71 y.o. Female       Interpretation Summary      · Right internal carotid artery stenosis of 0-49%.  · Left ICA stent present, with patent flow noted.                Condition on Discharge:  stable    Physical Exam on Discharge:  Visit Vitals   • /70   • Pulse 82   • Temp 98.6 °F (37 °C) (Oral)   • Resp 16   • Ht 67\" (170.2 cm)   • Wt 170 lb (77.1 kg)   • SpO2 94%   • BMI 26.63 kg/m2     Physical Exam   Constitutional: She is oriented to person, place, and time. She appears well-developed and well-nourished. No distress.   HENT:   Head: Normocephalic and atraumatic.   Eyes: Pupils are equal, round, and reactive to light. No scleral icterus.   Neck: Normal range of motion. No JVD present.   Cardiovascular: Normal rate, regular rhythm, normal heart sounds and intact distal pulses.    No murmur heard.  Pulmonary/Chest: Effort normal and breath sounds normal. No respiratory distress. She has no wheezes.   Abdominal: Soft. Bowel sounds are normal. She exhibits no distension. There is no tenderness.   Musculoskeletal: Normal range of motion. She exhibits no edema.   Neurological: She is alert and oriented to person, place, and time.   Skin: Skin is warm and dry.   Psychiatric: She has a normal mood and affect. Her behavior is normal. Judgment and thought content normal.         Discharge Disposition  Home or Self Care    Discharge Medications   Sunshine Her   Home Medication Instructions MEDINA:[id number]    Printed " on:02/12/17 1022   Medication Information                      acetaminophen (TYLENOL) 325 MG tablet  Take 2 tablets by mouth Every 4 (Four) Hours As Needed for mild pain (1-3) or fever (Temperature greater than or equal to 37 C).             amLODIPine (NORVASC) 5 MG tablet  Take 1 tablet by mouth Daily.             aspirin 81 MG chewable tablet  Chew 1 tablet Daily.             atorvastatin (LIPITOR) 40 MG tablet  Take 1 tablet by mouth Daily.             buPROPion XL (WELLBUTRIN XL) 300 MG 24 hr tablet  Take 1 tablet by mouth Every Morning.             donepezil (ARICEPT) 10 MG tablet  Take 1 tablet by mouth Daily.             DULoxetine (CYMBALTA) 60 MG capsule  Take 1 capsule by mouth 2 (Two) Times a Day.             ferrous sulfate (IRON SUPPLEMENT) 325 (65 FE) MG tablet  Take 1 tablet by mouth 2 (Two) Times a Day With Meals.             fluticasone-salmeterol (ADVAIR) 100-50 MCG/DOSE DISKUS  Inhale 1 puff 2 (Two) Times a Day. Patients family states they are not sure this is the right mg but her pharmacy is [facility].             levothyroxine (SYNTHROID, LEVOTHROID) 25 MCG tablet  Take 25 mcg by mouth daily.             memantine (NAMENDA) 10 MG tablet  Take 1 tablet by mouth 2 (two) times a day.             pantoprazole (PROTONIX) 40 MG EC tablet  Take 1 tablet by mouth 2 (Two) Times a Day Before Meals.             QUEtiapine (SEROquel) 100 MG tablet  Take 1.5 tablets by mouth Every Night.             traZODone (DESYREL) 150 MG tablet  Take 1 tablet by mouth Every Night.                 Discharge Diet:   Diet Instructions     Diet: Regular, Cardiac; Thin Liquids, No Restrictions       Discharge Diet:   Regular  Cardiac      Fluid Consistency:  Thin Liquids, No Restrictions                 Discharge Care Plan / Instructions:    Activity at Discharge:   Activity Instructions     Activity as Tolerated       Stress slow poskition changes                 Follow-up Appointments  Future Appointments  Date Time  Provider Department Center   2/24/2017 11:30 AM MD CARMELA Tony III None     Referrals and Follow-ups to Schedule     Additional Follow-Up    As directed    PCP first week of March after GI appt   Follow Up Details:  as scheduled       Follow-Up    As directed    EGD/ Cscope- set up and office to call patient 2 days prior to procedure   Follow Up Details:  2/24/17                 Test Results Pending at Discharge       Emilee Montgomery, [place] 02/12/17 10:22 AM    Time: Discharge 32 min    Please note that portions of this note may have been completed with a voice recognition program. Efforts were made to edit the dictations, but occasionally words are mistranscribed.     No

## 2019-07-18 ENCOUNTER — APPOINTMENT (OUTPATIENT)
Dept: CT IMAGING | Facility: HOSPITAL | Age: 74
End: 2019-07-18

## 2019-07-18 ENCOUNTER — APPOINTMENT (OUTPATIENT)
Dept: GENERAL RADIOLOGY | Facility: HOSPITAL | Age: 74
End: 2019-07-18

## 2019-07-18 ENCOUNTER — HOSPITAL ENCOUNTER (EMERGENCY)
Facility: HOSPITAL | Age: 74
Discharge: HOME | End: 2019-07-18
Attending: EMERGENCY MEDICINE | Admitting: EMERGENCY MEDICINE

## 2019-07-18 VITALS
WEIGHT: 165 LBS | RESPIRATION RATE: 16 BRPM | BODY MASS INDEX: 25.9 KG/M2 | OXYGEN SATURATION: 99 % | DIASTOLIC BLOOD PRESSURE: 58 MMHG | TEMPERATURE: 98.2 F | HEART RATE: 77 BPM | HEIGHT: 67 IN | SYSTOLIC BLOOD PRESSURE: 125 MMHG
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
## 2019-07-18 DIAGNOSIS — S40.022A ARM CONTUSION, LEFT, INITIAL ENCOUNTER: ICD-10-CM

## 2019-07-18 PROCEDURE — 70450 CT HEAD/BRAIN W/O DYE: CPT | Performed by: RADIOLOGY

## 2019-07-18 PROCEDURE — 99283 EMERGENCY DEPT VISIT LOW MDM: CPT

## 2019-07-18 PROCEDURE — 73130 X-RAY EXAM OF HAND: CPT

## 2019-07-18 PROCEDURE — 73130 X-RAY EXAM OF HAND: CPT | Performed by: RADIOLOGY

## 2019-07-18 PROCEDURE — 70450 CT HEAD/BRAIN W/O DYE: CPT

## 2019-08-08 ENCOUNTER — LAB REQUISITION (OUTPATIENT)
Dept: LAB | Facility: HOSPITAL | Age: 74
End: 2019-08-08

## 2019-08-08 DIAGNOSIS — I69.361 OTHER PARALYTIC SYNDROME FOLLOWING CEREBRAL INFARCTION AFFECTING RIGHT DOMINANT SIDE (HCC): ICD-10-CM

## 2019-08-08 DIAGNOSIS — I69.398 OTHER SEQUELAE OF CEREBRAL INFARCTION: ICD-10-CM

## 2019-08-08 DIAGNOSIS — I69.391 DYSPHAGIA FOLLOWING CEREBRAL INFARCTION: ICD-10-CM

## 2019-08-08 DIAGNOSIS — F01.50 VASCULAR DEMENTIA WITHOUT BEHAVIORAL DISTURBANCE (HCC): ICD-10-CM

## 2019-08-08 DIAGNOSIS — I25.10 ATHEROSCLEROTIC HEART DISEASE OF NATIVE CORONARY ARTERY WITHOUT ANGINA PECTORIS: ICD-10-CM

## 2019-08-08 LAB
ALBUMIN SERPL-MCNC: 4.09 G/DL (ref 3.5–5.2)
ALBUMIN/GLOB SERPL: 1.1 G/DL
ALP SERPL-CCNC: 115 U/L (ref 39–117)
ALT SERPL W P-5'-P-CCNC: 18 U/L (ref 1–33)
ANION GAP SERPL CALCULATED.3IONS-SCNC: 14.6 MMOL/L (ref 5–15)
AST SERPL-CCNC: 22 U/L (ref 1–32)
BACTERIA UR QL AUTO: ABNORMAL /HPF
BASOPHILS # BLD AUTO: 0.03 10*3/MM3 (ref 0–0.2)
BASOPHILS NFR BLD AUTO: 0.5 % (ref 0–1.5)
BILIRUB SERPL-MCNC: 0.4 MG/DL (ref 0.2–1.2)
BILIRUB UR QL STRIP: NEGATIVE
BUN BLD-MCNC: 20 MG/DL (ref 8–23)
BUN/CREAT SERPL: 13.4 (ref 7–25)
CALCIUM SPEC-SCNC: 9.4 MG/DL (ref 8.6–10.5)
CHLORIDE SERPL-SCNC: 99 MMOL/L (ref 98–107)
CHOLEST SERPL-MCNC: 149 MG/DL (ref 0–200)
CLARITY UR: ABNORMAL
CO2 SERPL-SCNC: 27.4 MMOL/L (ref 22–29)
COLOR UR: YELLOW
CREAT BLD-MCNC: 1.49 MG/DL (ref 0.57–1)
DEPRECATED RDW RBC AUTO: 52.9 FL (ref 37–54)
EOSINOPHIL # BLD AUTO: 0.29 10*3/MM3 (ref 0–0.4)
EOSINOPHIL NFR BLD AUTO: 4.7 % (ref 0.3–6.2)
ERYTHROCYTE [DISTWIDTH] IN BLOOD BY AUTOMATED COUNT: 13.9 % (ref 12.3–15.4)
FERRITIN SERPL-MCNC: 90.91 NG/ML (ref 13–150)
GFR SERPL CREATININE-BSD FRML MDRD: 34 ML/MIN/1.73
GLOBULIN UR ELPH-MCNC: 3.8 GM/DL
GLUCOSE BLD-MCNC: 87 MG/DL (ref 65–99)
GLUCOSE UR STRIP-MCNC: NEGATIVE MG/DL
HCT VFR BLD AUTO: 41.2 % (ref 34–46.6)
HDLC SERPL-MCNC: 33 MG/DL (ref 40–60)
HGB BLD-MCNC: 13.1 G/DL (ref 12–15.9)
HGB UR QL STRIP.AUTO: ABNORMAL
HYALINE CASTS UR QL AUTO: ABNORMAL /LPF
IMM GRANULOCYTES # BLD AUTO: 0.06 10*3/MM3 (ref 0–0.05)
IMM GRANULOCYTES NFR BLD AUTO: 1 % (ref 0–0.5)
IRON 24H UR-MRATE: 66 MCG/DL (ref 37–145)
IRON SATN MFR SERPL: 19 % (ref 20–50)
KETONES UR QL STRIP: NEGATIVE
LDLC SERPL CALC-MCNC: 67 MG/DL (ref 0–100)
LDLC/HDLC SERPL: 2.04 {RATIO}
LEUKOCYTE ESTERASE UR QL STRIP.AUTO: ABNORMAL
LYMPHOCYTES # BLD AUTO: 1.89 10*3/MM3 (ref 0.7–3.1)
LYMPHOCYTES NFR BLD AUTO: 30.5 % (ref 19.6–45.3)
MCH RBC QN AUTO: 33.1 PG (ref 26.6–33)
MCHC RBC AUTO-ENTMCNC: 31.8 G/DL (ref 31.5–35.7)
MCV RBC AUTO: 104 FL (ref 79–97)
MONOCYTES # BLD AUTO: 0.47 10*3/MM3 (ref 0.1–0.9)
MONOCYTES NFR BLD AUTO: 7.6 % (ref 5–12)
NEUTROPHILS # BLD AUTO: 3.45 10*3/MM3 (ref 1.7–7)
NEUTROPHILS NFR BLD AUTO: 55.7 % (ref 42.7–76)
NITRITE UR QL STRIP: NEGATIVE
PH UR STRIP.AUTO: 6.5 [PH] (ref 5–8)
PLATELET # BLD AUTO: 216 10*3/MM3 (ref 140–450)
PMV BLD AUTO: 9.7 FL (ref 6–12)
POTASSIUM BLD-SCNC: 4.2 MMOL/L (ref 3.5–5.2)
PROT SERPL-MCNC: 7.9 G/DL (ref 6–8.5)
PROT UR QL STRIP: ABNORMAL
RBC # BLD AUTO: 3.96 10*6/MM3 (ref 3.77–5.28)
RBC # UR: ABNORMAL /HPF
REF LAB TEST METHOD: ABNORMAL
SODIUM BLD-SCNC: 141 MMOL/L (ref 136–145)
SP GR UR STRIP: 1.01 (ref 1–1.03)
SQUAMOUS #/AREA URNS HPF: ABNORMAL /HPF
TIBC SERPL-MCNC: 355 MCG/DL (ref 298–536)
TRANSFERRIN SERPL-MCNC: 238 MG/DL (ref 200–360)
TRIGL SERPL-MCNC: 244 MG/DL (ref 0–150)
TSH SERPL DL<=0.05 MIU/L-ACNC: 0.67 MIU/ML (ref 0.27–4.2)
UROBILINOGEN UR QL STRIP: ABNORMAL
VLDLC SERPL-MCNC: 48.8 MG/DL
WBC NRBC COR # BLD: 6.19 10*3/MM3 (ref 3.4–10.8)
WBC UR QL AUTO: ABNORMAL /HPF

## 2019-08-08 PROCEDURE — 85025 COMPLETE CBC W/AUTO DIFF WBC: CPT | Performed by: FAMILY MEDICINE

## 2019-08-08 PROCEDURE — 80061 LIPID PANEL: CPT | Performed by: FAMILY MEDICINE

## 2019-08-08 PROCEDURE — 87086 URINE CULTURE/COLONY COUNT: CPT | Performed by: FAMILY MEDICINE

## 2019-08-08 PROCEDURE — 82043 UR ALBUMIN QUANTITATIVE: CPT | Performed by: FAMILY MEDICINE

## 2019-08-08 PROCEDURE — 81001 URINALYSIS AUTO W/SCOPE: CPT | Performed by: FAMILY MEDICINE

## 2019-08-08 PROCEDURE — 82570 ASSAY OF URINE CREATININE: CPT | Performed by: FAMILY MEDICINE

## 2019-08-08 PROCEDURE — 84443 ASSAY THYROID STIM HORMONE: CPT | Performed by: FAMILY MEDICINE

## 2019-08-08 PROCEDURE — 83540 ASSAY OF IRON: CPT | Performed by: FAMILY MEDICINE

## 2019-08-08 PROCEDURE — 80053 COMPREHEN METABOLIC PANEL: CPT | Performed by: FAMILY MEDICINE

## 2019-08-08 PROCEDURE — 84466 ASSAY OF TRANSFERRIN: CPT | Performed by: FAMILY MEDICINE

## 2019-08-08 PROCEDURE — 82728 ASSAY OF FERRITIN: CPT | Performed by: FAMILY MEDICINE

## 2019-08-09 LAB
ALBUMIN UR-MCNC: 2.3 MG/DL
BACTERIA SPEC AEROBE CULT: NORMAL
CREAT UR-MCNC: 100.2 MG/DL
MICROALBUMIN/CREAT UR: 23 MG/G

## 2019-08-15 NOTE — THERAPY DISCHARGE NOTE
Outpatient Physical Therapy Discharge Summary         Patient Name: Sunshine Her  : 1945  MRN: [id number]    Today's Date: 3/16/2018    Visit Dx:    ICD-10-CM ICD-9-CM   1. Unsteady gait R26.81 781.2   2. Right hemiparesis G81.91 342.90   3. History of stroke Z86.73 V12.54   4. Left hemiparesis G81.94 342.90             PT OP Goals     Row Name 18 0900          PT Short Term Goals    STG Date to Achieve --   As of 18  -AD     STG 1 Pt and caregiver will be instructed in HEP.  -AD     STG 1 Progress Met  -AD     STG 2 Pt and family will be provided education on home safety and fall prevention.  -AD     STG 2 Progress Met  -AD     STG 3 Pt will score improve 4 points on the Tinetti for improved mobility.  -AD     STG 3 Progress Ongoing  -AD        Long Term Goals    LTG Date to Achieve --   As of 18  -AD     LTG 1 Pt will perform SLS for 5 seconds for improved balance and stability.  -AD     LTG 1 Progress Not Met  -AD     LTG 2 Pt will perform tandem stance for 20 seconds for improved balance and stability.  -AD     LTG 2 Progress Not Met  -AD     LTG 3 Pt will ambulate 100' with equal weight shifting and toe clearance, using appropriate assistive device, for improved mobility and safety.  -AD     LTG 3 Progress Progressing  -AD     LTG 4 Pt will score 15/28 on the Tinetti for improved gait, balance, and safety.  -AD     LTG 4 Progress Progressing  -AD       User Key  (r) = Recorded By, (t) = Taken By, (c) = Cosigned By    Initials Name Provider Type    AD Ashley Claudene Dalton, PT Physical Therapist          OP PT Discharge Summary  Date of Discharge: 18  Reason for Discharge:  (Discharge was discussed with patient due to Medicare monies, however patient was scheduled to attend therapy on 18 prior to discharge. However, the patient failed to attend and contact has not been made with the physical therapy clinic.)  Outcomes Achieved:  (As of 18, the patient had achieved 2/7  established goals. Updated outcomes achieved were not assessed due to failure to attend her last scheduled appointment. )  Discharge Destination: Home with home program (HEP provided at initial evaluation.)  Discharge Instructions/Additional Comments: The patient attended her initial evaluation on 1/10/18 and attended twelve follow up therapy sessions. She failed to attend on 2/28/18 and the clinic was unable to make contact with the patient. Therefore, the patient is being discharged at this time.      Time Calculation:                    Ashley Claudene Dalton, PT  3/16/2018        DC instructions

## 2020-05-29 NOTE — ED NOTES
Patient stable overnight. VSS. Afebrile. No acute distress noted. Pain controlled overnight with tylenol and motrin. Patient awoke this AM in pain. Diazepam given X1.  Patient drinking water overnight. Sutures, CDI. POC reviewed with patient mother. Verbalized understanding. Will continue to monitor.    EKG performed by tech at 1409 shown to Dr. Belle Hall  02/09/17 1415

## 2021-09-03 ENCOUNTER — APPOINTMENT (OUTPATIENT)
Dept: GENERAL RADIOLOGY | Facility: HOSPITAL | Age: 76
End: 2021-09-03

## 2021-09-03 ENCOUNTER — HOSPITAL ENCOUNTER (OUTPATIENT)
Facility: HOSPITAL | Age: 76
Setting detail: OBSERVATION
Discharge: LEFT BEFORE DISCHARGE | End: 2021-09-04
Attending: HOSPITALIST | Admitting: INTERNAL MEDICINE

## 2021-09-03 DIAGNOSIS — R07.9 CHEST PAIN, UNSPECIFIED TYPE: Primary | ICD-10-CM

## 2021-09-03 LAB
ALBUMIN SERPL-MCNC: 4.24 G/DL (ref 3.5–5.2)
ALBUMIN/GLOB SERPL: 1 G/DL
ALP SERPL-CCNC: 127 U/L (ref 39–117)
ALT SERPL W P-5'-P-CCNC: 13 U/L (ref 1–33)
ANION GAP SERPL CALCULATED.3IONS-SCNC: 10.6 MMOL/L (ref 5–15)
AST SERPL-CCNC: 19 U/L (ref 1–32)
BASOPHILS # BLD AUTO: 0.04 10*3/MM3 (ref 0–0.2)
BASOPHILS NFR BLD AUTO: 0.4 % (ref 0–1.5)
BILIRUB SERPL-MCNC: 0.2 MG/DL (ref 0–1.2)
BUN SERPL-MCNC: 28 MG/DL (ref 8–23)
BUN/CREAT SERPL: 20.4 (ref 7–25)
CALCIUM SPEC-SCNC: 9.5 MG/DL (ref 8.6–10.5)
CHLORIDE SERPL-SCNC: 101 MMOL/L (ref 98–107)
CO2 SERPL-SCNC: 25.4 MMOL/L (ref 22–29)
CREAT SERPL-MCNC: 1.37 MG/DL (ref 0.57–1)
DEPRECATED RDW RBC AUTO: 52.1 FL (ref 37–54)
EOSINOPHIL # BLD AUTO: 0.2 10*3/MM3 (ref 0–0.4)
EOSINOPHIL NFR BLD AUTO: 2.2 % (ref 0.3–6.2)
ERYTHROCYTE [DISTWIDTH] IN BLOOD BY AUTOMATED COUNT: 16.9 % (ref 12.3–15.4)
FLUAV SUBTYP SPEC NAA+PROBE: NOT DETECTED
FLUBV RNA ISLT QL NAA+PROBE: NOT DETECTED
GFR SERPL CREATININE-BSD FRML MDRD: 37 ML/MIN/1.73
GLOBULIN UR ELPH-MCNC: 4.1 GM/DL
GLUCOSE SERPL-MCNC: 134 MG/DL (ref 65–99)
HCT VFR BLD AUTO: 37.7 % (ref 34–46.6)
HGB BLD-MCNC: 11.1 G/DL (ref 12–15.9)
HOLD SPECIMEN: NORMAL
HOLD SPECIMEN: NORMAL
IMM GRANULOCYTES # BLD AUTO: 0.06 10*3/MM3 (ref 0–0.05)
IMM GRANULOCYTES NFR BLD AUTO: 0.7 % (ref 0–0.5)
LYMPHOCYTES # BLD AUTO: 1.77 10*3/MM3 (ref 0.7–3.1)
LYMPHOCYTES NFR BLD AUTO: 19.3 % (ref 19.6–45.3)
MCH RBC QN AUTO: 25.1 PG (ref 26.6–33)
MCHC RBC AUTO-ENTMCNC: 29.4 G/DL (ref 31.5–35.7)
MCV RBC AUTO: 85.3 FL (ref 79–97)
MONOCYTES # BLD AUTO: 0.82 10*3/MM3 (ref 0.1–0.9)
MONOCYTES NFR BLD AUTO: 9 % (ref 5–12)
NEUTROPHILS NFR BLD AUTO: 6.26 10*3/MM3 (ref 1.7–7)
NEUTROPHILS NFR BLD AUTO: 68.4 % (ref 42.7–76)
NRBC BLD AUTO-RTO: 0 /100 WBC (ref 0–0.2)
PLATELET # BLD AUTO: 223 10*3/MM3 (ref 140–450)
PMV BLD AUTO: 8.9 FL (ref 6–12)
POTASSIUM SERPL-SCNC: 5.2 MMOL/L (ref 3.5–5.2)
PROT SERPL-MCNC: 8.3 G/DL (ref 6–8.5)
QT INTERVAL: 378 MS
QTC INTERVAL: 439 MS
RBC # BLD AUTO: 4.42 10*6/MM3 (ref 3.77–5.28)
SARS-COV-2 RNA PNL SPEC NAA+PROBE: NOT DETECTED
SODIUM SERPL-SCNC: 137 MMOL/L (ref 136–145)
TROPONIN T SERPL-MCNC: <0.01 NG/ML (ref 0–0.03)
TROPONIN T SERPL-MCNC: <0.01 NG/ML (ref 0–0.03)
TSH SERPL DL<=0.05 MIU/L-ACNC: 2.16 UIU/ML (ref 0.27–4.2)
WBC # BLD AUTO: 9.15 10*3/MM3 (ref 3.4–10.8)
WHOLE BLOOD HOLD SPECIMEN: NORMAL
WHOLE BLOOD HOLD SPECIMEN: NORMAL

## 2021-09-03 PROCEDURE — G0378 HOSPITAL OBSERVATION PER HR: HCPCS

## 2021-09-03 PROCEDURE — C9803 HOPD COVID-19 SPEC COLLECT: HCPCS

## 2021-09-03 PROCEDURE — 84484 ASSAY OF TROPONIN QUANT: CPT | Performed by: NURSE PRACTITIONER

## 2021-09-03 PROCEDURE — 93005 ELECTROCARDIOGRAM TRACING: CPT | Performed by: NURSE PRACTITIONER

## 2021-09-03 PROCEDURE — 83036 HEMOGLOBIN GLYCOSYLATED A1C: CPT | Performed by: HOSPITALIST

## 2021-09-03 PROCEDURE — 99285 EMERGENCY DEPT VISIT HI MDM: CPT

## 2021-09-03 PROCEDURE — 87636 SARSCOV2 & INF A&B AMP PRB: CPT | Performed by: PHYSICIAN ASSISTANT

## 2021-09-03 PROCEDURE — 85025 COMPLETE CBC W/AUTO DIFF WBC: CPT | Performed by: NURSE PRACTITIONER

## 2021-09-03 PROCEDURE — 71045 X-RAY EXAM CHEST 1 VIEW: CPT

## 2021-09-03 PROCEDURE — 84443 ASSAY THYROID STIM HORMONE: CPT | Performed by: HOSPITALIST

## 2021-09-03 PROCEDURE — 80053 COMPREHEN METABOLIC PANEL: CPT | Performed by: NURSE PRACTITIONER

## 2021-09-03 PROCEDURE — 93010 ELECTROCARDIOGRAM REPORT: CPT | Performed by: INTERNAL MEDICINE

## 2021-09-03 PROCEDURE — 99220 PR INITIAL OBSERVATION CARE/DAY 70 MINUTES: CPT | Performed by: HOSPITALIST

## 2021-09-03 RX ORDER — ASPIRIN 81 MG/1
81 TABLET ORAL DAILY
Status: DISCONTINUED | OUTPATIENT
Start: 2021-09-04 | End: 2021-09-04 | Stop reason: HOSPADM

## 2021-09-03 RX ORDER — ASPIRIN 81 MG/1
324 TABLET, CHEWABLE ORAL ONCE
Status: COMPLETED | OUTPATIENT
Start: 2021-09-03 | End: 2021-09-03

## 2021-09-03 RX ORDER — ATORVASTATIN CALCIUM 40 MG/1
40 TABLET, FILM COATED ORAL NIGHTLY
Status: DISCONTINUED | OUTPATIENT
Start: 2021-09-04 | End: 2021-09-04 | Stop reason: HOSPADM

## 2021-09-03 RX ADMIN — ASPIRIN 324 MG: 81 TABLET, CHEWABLE ORAL at 21:30

## 2021-09-04 ENCOUNTER — APPOINTMENT (OUTPATIENT)
Dept: NUCLEAR MEDICINE | Facility: HOSPITAL | Age: 76
End: 2021-09-04

## 2021-09-04 ENCOUNTER — APPOINTMENT (OUTPATIENT)
Dept: GENERAL RADIOLOGY | Facility: HOSPITAL | Age: 76
End: 2021-09-04

## 2021-09-04 ENCOUNTER — APPOINTMENT (OUTPATIENT)
Dept: CARDIOLOGY | Facility: HOSPITAL | Age: 76
End: 2021-09-04

## 2021-09-04 VITALS
HEART RATE: 97 BPM | OXYGEN SATURATION: 99 % | BODY MASS INDEX: 24.96 KG/M2 | RESPIRATION RATE: 18 BRPM | SYSTOLIC BLOOD PRESSURE: 123 MMHG | WEIGHT: 159 LBS | DIASTOLIC BLOOD PRESSURE: 53 MMHG | HEIGHT: 67 IN | TEMPERATURE: 97.6 F

## 2021-09-04 LAB
ALBUMIN SERPL-MCNC: 3.8 G/DL (ref 3.5–5.2)
ALBUMIN/GLOB SERPL: 1 G/DL
ALP SERPL-CCNC: 119 U/L (ref 39–117)
ALT SERPL W P-5'-P-CCNC: 12 U/L (ref 1–33)
ANION GAP SERPL CALCULATED.3IONS-SCNC: 10.5 MMOL/L (ref 5–15)
AST SERPL-CCNC: 20 U/L (ref 1–32)
BASOPHILS # BLD AUTO: 0.04 10*3/MM3 (ref 0–0.2)
BASOPHILS NFR BLD AUTO: 0.5 % (ref 0–1.5)
BH CV ECHO MEAS - AO MAX PG: 8.2 MMHG
BH CV ECHO MEAS - AO MEAN PG: 4 MMHG
BH CV ECHO MEAS - AO ROOT AREA (BSA CORRECTED): 1.6
BH CV ECHO MEAS - AO ROOT AREA: 6.6 CM^2
BH CV ECHO MEAS - AO ROOT DIAM: 2.9 CM
BH CV ECHO MEAS - AO V2 MAX: 143 CM/SEC
BH CV ECHO MEAS - AO V2 MEAN: 89.7 CM/SEC
BH CV ECHO MEAS - AO V2 VTI: 26.1 CM
BH CV ECHO MEAS - BSA(HAYCOCK): 1.9 M^2
BH CV ECHO MEAS - BSA: 1.8 M^2
BH CV ECHO MEAS - BZI_BMI: 24.9 KILOGRAMS/M^2
BH CV ECHO MEAS - BZI_METRIC_HEIGHT: 170.2 CM
BH CV ECHO MEAS - BZI_METRIC_WEIGHT: 72.1 KG
BH CV ECHO MEAS - EDV(CUBED): 35.9 ML
BH CV ECHO MEAS - EDV(MOD-SP4): 9.5 ML
BH CV ECHO MEAS - EDV(TEICH): 44.1 ML
BH CV ECHO MEAS - EF(CUBED): 70.4 %
BH CV ECHO MEAS - EF(MOD-SP4): 76.6 %
BH CV ECHO MEAS - EF(TEICH): 63.3 %
BH CV ECHO MEAS - ESV(CUBED): 10.6 ML
BH CV ECHO MEAS - ESV(MOD-SP4): 2.2 ML
BH CV ECHO MEAS - ESV(TEICH): 16.2 ML
BH CV ECHO MEAS - FS: 33.3 %
BH CV ECHO MEAS - IVS/LVPW: 0.91
BH CV ECHO MEAS - IVSD: 1 CM
BH CV ECHO MEAS - LV DIASTOLIC VOL/BSA (35-75): 5.2 ML/M^2
BH CV ECHO MEAS - LV MASS(C)D: 101.7 GRAMS
BH CV ECHO MEAS - LV MASS(C)DI: 55.5 GRAMS/M^2
BH CV ECHO MEAS - LV SYSTOLIC VOL/BSA (12-30): 1.2 ML/M^2
BH CV ECHO MEAS - LVIDD: 3.3 CM
BH CV ECHO MEAS - LVIDS: 2.2 CM
BH CV ECHO MEAS - LVLD AP4: 5.8 CM
BH CV ECHO MEAS - LVLS AP4: 4.5 CM
BH CV ECHO MEAS - LVOT AREA (M): 2 CM^2
BH CV ECHO MEAS - LVOT AREA: 2 CM^2
BH CV ECHO MEAS - LVOT DIAM: 1.6 CM
BH CV ECHO MEAS - LVPWD: 1.1 CM
BH CV ECHO MEAS - MV A MAX VEL: 124 CM/SEC
BH CV ECHO MEAS - MV E MAX VEL: 86.3 CM/SEC
BH CV ECHO MEAS - MV E/A: 0.7
BH CV ECHO MEAS - PA ACC TIME: 0.11 SEC
BH CV ECHO MEAS - PA PR(ACCEL): 31.3 MMHG
BH CV ECHO MEAS - SI(AO): 94 ML/M^2
BH CV ECHO MEAS - SI(CUBED): 13.8 ML/M^2
BH CV ECHO MEAS - SI(MOD-SP4): 4 ML/M^2
BH CV ECHO MEAS - SI(TEICH): 15.2 ML/M^2
BH CV ECHO MEAS - SV(AO): 172.4 ML
BH CV ECHO MEAS - SV(CUBED): 25.3 ML
BH CV ECHO MEAS - SV(MOD-SP4): 7.3 ML
BH CV ECHO MEAS - SV(TEICH): 27.9 ML
BH CV NUCLEAR PRIOR STUDY: 3
BH CV REST NUCLEAR ISOTOPE DOSE: 10.1 MCI
BH CV STRESS BP STAGE 1: NORMAL
BH CV STRESS BP STAGE 2: NORMAL
BH CV STRESS COMMENTS STAGE 1: NORMAL
BH CV STRESS COMMENTS STAGE 2: NORMAL
BH CV STRESS DOSE REGADENOSON STAGE 1: 0.4
BH CV STRESS DURATION MIN STAGE 1: 0
BH CV STRESS DURATION MIN STAGE 2: 4
BH CV STRESS DURATION SEC STAGE 1: 10
BH CV STRESS DURATION SEC STAGE 2: 0
BH CV STRESS HR STAGE 1: 85
BH CV STRESS HR STAGE 2: 94
BH CV STRESS NUCLEAR ISOTOPE DOSE: 30.3 MCI
BH CV STRESS PROTOCOL 1: NORMAL
BH CV STRESS RECOVERY BP: NORMAL MMHG
BH CV STRESS RECOVERY HR: 87 BPM
BH CV STRESS STAGE 1: 1
BH CV STRESS STAGE 2: 2
BILIRUB SERPL-MCNC: 0.2 MG/DL (ref 0–1.2)
BUN SERPL-MCNC: 26 MG/DL (ref 8–23)
BUN/CREAT SERPL: 20.3 (ref 7–25)
CALCIUM SPEC-SCNC: 9 MG/DL (ref 8.6–10.5)
CHLORIDE SERPL-SCNC: 102 MMOL/L (ref 98–107)
CHOLEST SERPL-MCNC: 142 MG/DL (ref 0–200)
CO2 SERPL-SCNC: 24.5 MMOL/L (ref 22–29)
CREAT SERPL-MCNC: 1.28 MG/DL (ref 0.57–1)
DEPRECATED RDW RBC AUTO: 52.3 FL (ref 37–54)
EOSINOPHIL # BLD AUTO: 0.24 10*3/MM3 (ref 0–0.4)
EOSINOPHIL NFR BLD AUTO: 2.9 % (ref 0.3–6.2)
ERYTHROCYTE [DISTWIDTH] IN BLOOD BY AUTOMATED COUNT: 16.8 % (ref 12.3–15.4)
GFR SERPL CREATININE-BSD FRML MDRD: 41 ML/MIN/1.73
GLOBULIN UR ELPH-MCNC: 4 GM/DL
GLUCOSE SERPL-MCNC: 134 MG/DL (ref 65–99)
HBA1C MFR BLD: 5.4 % (ref 4.8–5.6)
HCT VFR BLD AUTO: 35.7 % (ref 34–46.6)
HDLC SERPL-MCNC: 31 MG/DL (ref 40–60)
HGB BLD-MCNC: 10.7 G/DL (ref 12–15.9)
IMM GRANULOCYTES # BLD AUTO: 0.06 10*3/MM3 (ref 0–0.05)
IMM GRANULOCYTES NFR BLD AUTO: 0.7 % (ref 0–0.5)
LDLC SERPL CALC-MCNC: 72 MG/DL (ref 0–100)
LDLC/HDLC SERPL: 2.03 {RATIO}
LV EF 2D ECHO EST: 65 %
LV EF NUC BP: 94 %
LYMPHOCYTES # BLD AUTO: 2.59 10*3/MM3 (ref 0.7–3.1)
LYMPHOCYTES NFR BLD AUTO: 30.8 % (ref 19.6–45.3)
MAXIMAL PREDICTED HEART RATE: 144 BPM
MAXIMAL PREDICTED HEART RATE: 144 BPM
MCH RBC QN AUTO: 25.5 PG (ref 26.6–33)
MCHC RBC AUTO-ENTMCNC: 30 G/DL (ref 31.5–35.7)
MCV RBC AUTO: 85 FL (ref 79–97)
MONOCYTES # BLD AUTO: 0.86 10*3/MM3 (ref 0.1–0.9)
MONOCYTES NFR BLD AUTO: 10.2 % (ref 5–12)
NEUTROPHILS NFR BLD AUTO: 4.62 10*3/MM3 (ref 1.7–7)
NEUTROPHILS NFR BLD AUTO: 54.9 % (ref 42.7–76)
NRBC BLD AUTO-RTO: 0 /100 WBC (ref 0–0.2)
PERCENT MAX PREDICTED HR: 70.14 %
PLATELET # BLD AUTO: 188 10*3/MM3 (ref 140–450)
PMV BLD AUTO: 9.3 FL (ref 6–12)
POTASSIUM SERPL-SCNC: 4.7 MMOL/L (ref 3.5–5.2)
PROT SERPL-MCNC: 7.8 G/DL (ref 6–8.5)
RBC # BLD AUTO: 4.2 10*6/MM3 (ref 3.77–5.28)
SODIUM SERPL-SCNC: 137 MMOL/L (ref 136–145)
STRESS BASELINE BP: NORMAL MMHG
STRESS BASELINE HR: 85 BPM
STRESS PERCENT HR: 83 %
STRESS POST PEAK BP: NORMAL MMHG
STRESS POST PEAK HR: 101 BPM
STRESS TARGET HR: 122 BPM
STRESS TARGET HR: 122 BPM
TRIGL SERPL-MCNC: 240 MG/DL (ref 0–150)
TROPONIN T SERPL-MCNC: <0.01 NG/ML (ref 0–0.03)
TROPONIN T SERPL-MCNC: <0.01 NG/ML (ref 0–0.03)
VLDLC SERPL-MCNC: 39 MG/DL (ref 5–40)
WBC # BLD AUTO: 8.41 10*3/MM3 (ref 3.4–10.8)

## 2021-09-04 PROCEDURE — 84484 ASSAY OF TROPONIN QUANT: CPT | Performed by: HOSPITALIST

## 2021-09-04 PROCEDURE — 80053 COMPREHEN METABOLIC PANEL: CPT | Performed by: HOSPITALIST

## 2021-09-04 PROCEDURE — 96374 THER/PROPH/DIAG INJ IV PUSH: CPT

## 2021-09-04 PROCEDURE — G0378 HOSPITAL OBSERVATION PER HR: HCPCS

## 2021-09-04 PROCEDURE — 25010000002 HYDRALAZINE PER 20 MG: Performed by: PHYSICIAN ASSISTANT

## 2021-09-04 PROCEDURE — 85025 COMPLETE CBC W/AUTO DIFF WBC: CPT | Performed by: HOSPITALIST

## 2021-09-04 PROCEDURE — 78452 HT MUSCLE IMAGE SPECT MULT: CPT

## 2021-09-04 PROCEDURE — 93017 CV STRESS TEST TRACING ONLY: CPT

## 2021-09-04 PROCEDURE — 78452 HT MUSCLE IMAGE SPECT MULT: CPT | Performed by: INTERNAL MEDICINE

## 2021-09-04 PROCEDURE — 93018 CV STRESS TEST I&R ONLY: CPT | Performed by: INTERNAL MEDICINE

## 2021-09-04 PROCEDURE — 25010000002 REGADENOSON 0.4 MG/5ML SOLUTION: Performed by: INTERNAL MEDICINE

## 2021-09-04 PROCEDURE — 0 TECHNETIUM SESTAMIBI: Performed by: INTERNAL MEDICINE

## 2021-09-04 PROCEDURE — A9500 TC99M SESTAMIBI: HCPCS | Performed by: INTERNAL MEDICINE

## 2021-09-04 PROCEDURE — 80061 LIPID PANEL: CPT | Performed by: HOSPITALIST

## 2021-09-04 PROCEDURE — 74018 RADEX ABDOMEN 1 VIEW: CPT

## 2021-09-04 PROCEDURE — 93306 TTE W/DOPPLER COMPLETE: CPT

## 2021-09-04 PROCEDURE — 93016 CV STRESS TEST SUPVJ ONLY: CPT | Performed by: INTERNAL MEDICINE

## 2021-09-04 PROCEDURE — 93306 TTE W/DOPPLER COMPLETE: CPT | Performed by: INTERNAL MEDICINE

## 2021-09-04 RX ORDER — SODIUM CHLORIDE 0.9 % (FLUSH) 0.9 %
10 SYRINGE (ML) INJECTION EVERY 12 HOURS SCHEDULED
Status: DISCONTINUED | OUTPATIENT
Start: 2021-09-04 | End: 2021-09-04 | Stop reason: HOSPADM

## 2021-09-04 RX ORDER — SODIUM CHLORIDE 0.9 % (FLUSH) 0.9 %
10 SYRINGE (ML) INJECTION AS NEEDED
Status: DISCONTINUED | OUTPATIENT
Start: 2021-09-04 | End: 2021-09-04 | Stop reason: HOSPADM

## 2021-09-04 RX ORDER — HYDRALAZINE HYDROCHLORIDE 20 MG/ML
10 INJECTION INTRAMUSCULAR; INTRAVENOUS EVERY 6 HOURS PRN
Status: DISCONTINUED | OUTPATIENT
Start: 2021-09-04 | End: 2021-09-04 | Stop reason: HOSPADM

## 2021-09-04 RX ORDER — NITROGLYCERIN 0.4 MG/1
0.4 TABLET SUBLINGUAL
Status: DISCONTINUED | OUTPATIENT
Start: 2021-09-04 | End: 2021-09-04 | Stop reason: HOSPADM

## 2021-09-04 RX ADMIN — TECHNETIUM TC 99M SESTAMIBI 1 DOSE: 1 INJECTION INTRAVENOUS at 09:13

## 2021-09-04 RX ADMIN — HYDRALAZINE HYDROCHLORIDE 10 MG: 20 INJECTION INTRAMUSCULAR; INTRAVENOUS at 04:35

## 2021-09-04 RX ADMIN — REGADENOSON 0.4 MG: 0.08 INJECTION, SOLUTION INTRAVENOUS at 11:25

## 2021-09-04 RX ADMIN — SODIUM CHLORIDE, PRESERVATIVE FREE 10 ML: 5 INJECTION INTRAVENOUS at 02:08

## 2021-09-04 NOTE — ED NOTES
Patient brought via EMS and has difficulty ambulating; EKG delayed due to getting patient off of EMS stretcher.     Emilee Ferguson, RN  09/03/21 2031

## 2021-09-04 NOTE — DISCHARGE SUMMARY
[facility] HOSPITALISTS DISCHARGE SUMMARY    Patient Identification:  Name:  Sunshine Her  Age:  75 y.o.  Sex:  female  :  1945  MRN:  [id number]  Visit Number:  [id number]    Date of Admission: 9/3/2021  Date of Discharge:  2021     PCP: Pati Escobedo APRN    DISCHARGE DIAGNOSIS  Chest Pain  Abd Pain  CKD IIIb  HTN  COPD  Hx CVA  Hx PFO  Hx Afib  Dementia    CONSULTS   None    PROCEDURES PERFORMED  Stress Testing  Echo    HOSPITAL COURSE  Patient is a 75 y.o. female presented to [facility] complaining of chest pains.  Please see the admitting history and physical for further details.      Patient admitted this morning per Dr. Stewart for chest pain, trops negative, stress testing ordered, echo ordered.  KUB ordered showing marked constipation.  This AM patient taken down for stress testing and echo and I did not see patient.  Bedside RN alerted me that patient's family wanted her to be discharged.  I called daughter who was in our parking lot at her request and discussed I had not seen the patient and that her workup has not been completed so I could not medically discharge her due to her unknown clinical status.  She reported she would like to sign her out AMA.  I told her if she was the POA and that was her choice she could do so but that I would not be discharging her until I had seen her and her workup and treatments were complete.  I called bedside RN back and let him know that if she was his POA and wanted to sign her out when she was back from her stress test she could.  Apparently she was not patient's POA, who was another daughter listed in chart and reportedly lives in [place].  Bedside RN spoke w/ POA via the phone who deferred speaking with me but reported her sister would be signing patient out AMA and verbally consented to her mother being signed out AMA per her sister.  Paperwork was signed, patient was not in her room when I went back by the floor.  I  cannot comment further on her clinical status or patient's home meds at time of discharge.  Testing still pending at this time.  I had bedside RN instruct family that they could have patient's PCP request stress test results.    VITAL SIGNS:  Temp:  [97.5 °F (36.4 °C)-98.5 °F (36.9 °C)] 97.6 °F (36.4 °C)  Heart Rate:  [65-97] 97  Resp:  [18-20] 18  BP: (115-208)/(53-90) 123/53  SpO2:  [97 %-100 %] 99 %  on  Flow (L/min):  [2] 2;   Device (Oxygen Therapy): nasal cannula    Body mass index is 24.9 kg/m².  Wt Readings from Last 3 Encounters:   09/04/21 72.1 kg (159 lb)   07/18/19 74.8 kg (165 lb)   02/26/18 81.6 kg (180 lb)     PHYSICAL EXAM:  Patient left AMA before I could see    DISCHARGE DISPOSITION   Guarded    DISCHARGE MEDICATIONS:     Discharge Medications      ASK your doctor about these medications      Instructions Start Date   acetaminophen 500 MG tablet  Commonly known as: TYLENOL   500 mg, Per PEG Tube, Every 4 Hours PRN      albuterol sulfate  (90 Base) MCG/ACT inhaler  Commonly known as: PROVENTIL HFA;VENTOLIN HFA;PROAIR HFA   2 puffs, Inhalation, Every 6 Hours PRN      amLODIPine 5 MG tablet  Commonly known as: NORVASC   10 mg, Per G Tube, Every 24 Hours Scheduled      apixaban 5 MG tablet tablet  Commonly known as: Eliquis   5 mg, Oral, 2 Times Daily      apixaban 5 MG tablet tablet  Commonly known as: ELIQUIS   5 mg, Oral, 2 Times Daily      aspirin 81 MG EC tablet   81 mg, Oral, Daily      atenolol 25 MG tablet  Commonly known as: TENORMIN   25 mg, Oral, Daily      atorvastatin 40 MG tablet  Commonly known as: LIPITOR   40 mg, Oral, Daily      donepezil 10 MG tablet  Commonly known as: ARICEPT   10 mg, Oral, Nightly      DULoxetine 60 MG capsule  Commonly known as: CYMBALTA   60 mg, Oral, Nightly PRN      ferrous sulfate 325 (65 FE) MG tablet  Commonly known as: Iron Supplement   325 mg, Oral, 2 Times Daily With Meals      fish oil 1000 MG capsule capsule   1,000 mg, Oral, 3 Times Daily With  Meals      levothyroxine 25 MCG tablet  Commonly known as: SYNTHROID, LEVOTHROID   25 mcg, Per PEG Tube, Daily      loratadine 10 MG tablet  Commonly known as: CLARITIN   10 mg, Oral, Daily      memantine 10 MG tablet  Commonly known as: NAMENDA   10 mg, Oral, 2 Times Daily      mirtazapine 30 MG tablet  Commonly known as: REMERON   30 mg, Per PEG Tube, Nightly      pantoprazole 40 MG EC tablet  Commonly known as: PROTONIX   40 mg, Oral, 2 Times Daily Before Meals      QUEtiapine  MG 24 hr tablet  Commonly known as: SEROquel XR   150 mg, Oral, Nightly      traZODone 150 MG tablet  Commonly known as: DESYREL   150 mg, Oral, Nightly             Follow-up Information     Pati Escobedo, APRN .    Specialty: Nurse Practitioner  Contact information:  [address] [place] [address]  [phone]                  TEST  RESULTS PENDING AT DISCHARGE  None     CODE STATUS  Code Status and Medical Interventions:   Ordered at: 09/03/21 8170     Level Of Support Discussed With:    Patient     Code Status:    CPR     Medical Interventions (Level of Support Prior to Arrest):    Full     Patricio Gray MD  [facility]  09/04/21  15:25 EDT    Please note that this discharge summary required more than 30 minutes to complete.

## 2021-09-04 NOTE — ED PROVIDER NOTES
Subjective   75-year-old female with atrial fibrillation, chronic renal insufficiency not on dialysis, bipolar disorder, COPD nonoxygen dependent but has oxygen for as needed use, dementia, thyroid disease, GERD, heart disease, hyperlipidemia, hypertension, peripheral artery disease, and vertigo presents to the emergency room accompanied by her daughter for chest pain which began around 7 or 730 this evening.  Patient's daughter states they were driving down the road and states that patient used her left hand and had a tapped on her chest as if she was hurting.  She states that her mother is nonverbal since a severe stroke that she had and states when she started pointing to her chest she became really concerned.  She does state that the pain did subside at one point, but states it returned and states that she was afraid to not bring mother to the emergency room for further evaluation.  Patient does deny by shaking her head but having shortness of breath, nausea, vomiting, diaphoresis, or abdominal pain.  She does not endorse chest pressure, tightness, or squeezing.  She has not been around any sick contacts to her knowledge.  She has not been vaccinated against COVID-19.  Daughter states that she is total care.  She is mildly ambulatory with assistance but unstable.  She does eat and drink orally.  Denies any other complaints or concerns at this time.      History provided by:  Patient   used: No        Review of Systems   Unable to perform ROS: Patient nonverbal (History obtained from patient's daughter who is at bedside)   Constitutional: Negative.  Negative for fever.   HENT: Negative.    Respiratory: Negative.    Cardiovascular: Positive for chest pain.   Gastrointestinal: Negative.  Negative for abdominal pain.   Endocrine: Negative.    Genitourinary: Negative.  Negative for dysuria.   Skin: Negative.    Neurological: Negative.    Psychiatric/Behavioral: Negative.    All other systems  reviewed and are negative.      Past Medical History:   Diagnosis Date   • A-fib (CMS/[facility])    • Acute on chronic renal insufficiency 7/15/2016   • Anemia    • Anxiety    • Arthritis    • Arthritis    • Bipolar 1 disorder (CMS/[facility])    • Bradycardia    • COPD (chronic obstructive pulmonary disease) (CMS/[facility])    • Coronary artery disease    • Dementia    • Disease of thyroid gland    • Generalized weakness 7/14/2016   • GERD (gastroesophageal reflux disease)    • Heart disease    • History of transfusion    • Hyperlipidemia    • Hypertension    • PAD (peripheral artery disease) (CMS/[facility])    • Stroke (CMS/[facility])     8/2016, 2/2017, 6/2017   • TIA (transient ischemic attack)    • Vertigo        No Known Allergies    Past Surgical History:   Procedure Laterality Date   • ANGIOPLASTY SUBCLAVIAN ARTERY     • CAROTID ENDARTERECTOMY Bilateral    • COLONOSCOPY  2012   • COLONOSCOPY N/A 2/24/2017    Procedure: COLONOSCOPY  CPTCODE:70201;  Surgeon: Suraj Quintanilla III, MD;  Location: [facility];  Service:    • ENDOSCOPY     • ENDOSCOPY N/A 2/24/2017    Procedure: ESOPHAGOGASTRODUODENOSCOPY WITH BIOPSY  CPTCODE:24961;  Surgeon: Suraj Quintanilla III, MD;  Location: [facility] OR;  Service:    • ENDOSCOPY W/ PEG TUBE PLACEMENT N/A 6/28/2017    Procedure: ESOPHAGOGASTRODUODENOSCOPY WITH PERCUTANEOUS ENDOSCOPIC GASTROSTOMY TUBE INSERTION;  Surgeon: Renan Montanez MD;  Location: [facility];  Service:    • EYE SURGERY      bilat cataracts   • RENAL ARTERY BYPASS         Family History   Problem Relation Age of Onset   • Heart disease Mother    • Diabetes Mother    • Cancer Father    • Diabetes Sister    • Cancer Brother        Social History     Socioeconomic History   • Marital status:      Spouse name: Not on file   • Number of children: Not on file   • Years of education: Not on file   • Highest education level: Not on file   Tobacco Use   • Smoking status: Former Smoker     Packs/day: 1.00     Years: 40.00     Pack  "years: 40.00     Types: Cigarettes     Quit date: 2017     Years since quittin.5   • Smokeless tobacco: Never Used   Substance and Sexual Activity   • Alcohol use: No     Comment: \"used to drink a lot\" none now  (per daughter)   • Drug use: No   • Sexual activity: Defer           Objective   Physical Exam  Vitals and nursing note reviewed.   Constitutional:       General: She is not in acute distress.     Appearance: She is well-developed. She is not diaphoretic.   HENT:      Head: Normocephalic and atraumatic.      Right Ear: External ear normal.      Left Ear: External ear normal.      Nose: Nose normal.   Eyes:      Conjunctiva/sclera: Conjunctivae normal.      Pupils: Pupils are equal, round, and reactive to light.   Neck:      Vascular: No JVD.      Trachea: No tracheal deviation.   Cardiovascular:      Rate and Rhythm: Normal rate and regular rhythm.      Heart sounds: Normal heart sounds. No murmur heard.     Pulmonary:      Effort: Pulmonary effort is normal. No respiratory distress.      Breath sounds: Normal breath sounds. No wheezing.   Abdominal:      General: Bowel sounds are normal.      Palpations: Abdomen is soft.      Tenderness: There is no abdominal tenderness.   Musculoskeletal:         General: No deformity. Normal range of motion.      Cervical back: Normal range of motion and neck supple.   Skin:     General: Skin is warm and dry.      Coloration: Skin is not pale.      Findings: No erythema or rash.   Neurological:      Mental Status: She is alert and oriented to person, place, and time.      Cranial Nerves: No cranial nerve deficit.   Psychiatric:         Behavior: Behavior normal.         Thought Content: Thought content normal.         Procedures           ED Course  ED Course as of Sep 03 2243   Fri Sep 03, 2021   2125 EKG normal sinus rhythm ventricular 81 parable 144 QRS 74  no ST elevation.    [BB]    XR Chest 1 View [TK]    Pt has been accepted by Dr. Stewart, " pending COVID test.    [TK]      ED Course User Index  [BB] Singh Daley MD  [TK] Paige Callejas PA-C                                         HEART Score (for prediction of 6-week risk of major adverse cardiac event) reviewed and/or performed as part of the patient evaluation and treatment planning process.  The result associated with this review/performance is: 4       MDM  Number of Diagnoses or Management Options  Chest pain, unspecified type: new and requires workup     Amount and/or Complexity of Data Reviewed  Clinical lab tests: reviewed and ordered  Tests in the radiology section of CPT®: reviewed and ordered  Tests in the medicine section of CPT®: reviewed and ordered  Discuss the patient with other providers: yes (Thuy Stewart)    Risk of Complications, Morbidity, and/or Mortality  Presenting problems: moderate  Diagnostic procedures: moderate  Management options: moderate    Patient Progress  Patient progress: stable      Final diagnoses:   Chest pain, unspecified type       ED Disposition  ED Disposition     ED Disposition Condition Comment    Decision to Admit            No follow-up provider specified.       Medication List      No changes were made to your prescriptions during this visit.          Paige Callejas PA-C  09/03/21 1082

## 2021-09-04 NOTE — PLAN OF CARE
Goal Outcome Evaluation:  Plan of Care Reviewed With: patient    Patient is a new admit this shift from the ED  to [facility]. Patient has shook her head no when asked if she had SOA or chest pain. Patient is alert and nonverbal. Patient has remained NPO per order. Will continue to monitor and follow plan of care with interventions implemented as needed.         Progress: improving

## 2021-09-04 NOTE — ED NOTES
MEDICAL SCREENING:    Reason for Visit: Chest Pain    Patient initially seen in triage.  The patient was advised further evaluation and diagnostic testing will be needed, some of the treatment and testing will be initiated in the lobby in order to begin the process.  The patient will be returned to the waiting area for the time being and possibly be re-assessed by a subsequent ED provider.  The patient will be brought back to the treatment area in as timely manner as possible.       Cruzito Louise, APRN  09/03/21 2038

## 2021-09-04 NOTE — NURSING NOTE
Pt leaving AMA per family request. Dr. Gray made aware. Dr. Gray spoke with family. Informed them it was safest for pt to remain in hospital. Was informed to call House Supervisor to seek what to do in regards to PoA not being present to sign. Risk Management informed me to have PoA speak to Dr. Gray. PoA refused and informed me that the sister was going to sign her out. Risk Management informed me that was okay and that was all we could do. IV D/C'd. No acute distress noted. Will wheel pt out to personal vehicle.

## 2021-09-04 NOTE — H&P
"Hospitalist History and Physical        Patient Identification  Name: Sunshine Her  Age/Sex: 75 y.o. female  :  1945        MRN: [id number]  Visit Number: [id number]  Admit Date: 9/3/2021   PCP: Pati Escobedo APRN          Chief complaint chest pain    History of Present Illness:  Patient is a 75 y.o. female with history of afib on eliquis, anemia, stage IIIb CKD, COPD, CAD, dementia, hypothyroidism, anxiety/bipolar disorder, HTN, HLD, arthritis, PAD, multiple strokes (2016, 2017, 2017) resulting in severe debility and nonverbal status, who presents with reports of chest pain. Patient and her daughter were driving down the road around 7 or 730 this evening when the patient started tapping on her chest with her left hand. The daughter asked the patient if her chest was hurting and she nodded \"yes\". It subsided after a few minutes but then returned within the hour. The daughter became concerned and decided to bring her to the ED for further evaluation.     In the ED, vitals were stable. Labs showed stage IIIb CKD with creatinine within bsaeline range at 1.37. Troponin was negative x1. Chest XR was clear. EKG showed T wave inversion and AVL only, and electronic interpretation noted minimal voltage criteria for LVH which could be contributing to T wave inversion. Patient has been admitted to the telemetry unit under observation status for further workup of chest pain.     Patient is again nonverbal at baseline. By nodding \"yes\" and \"no\", she is able to communicate to me that she had 2 episodes of chest pain, that she had no accompanying shortness of breath, and that she is currently chest pain-free. Her daughter had mentioned to nursing staff that she had not had a bowel movement in several days. I asked the patient if her abdomen was hurting and she nodded yes.     Review of Systems  Review of Systems   Reason unable to perform ROS: very limited due to patient's nonverbal status.   Respiratory: " Negative for shortness of breath.    Cardiovascular: Positive for chest pain.   Gastrointestinal: Positive for abdominal pain.       History  Past Medical History:   Diagnosis Date   • A-fib (CMS/[facility])    • Acute on chronic renal insufficiency 7/15/2016   • Anemia    • Anxiety    • Arthritis    • Arthritis    • Bipolar 1 disorder (CMS/[facility])    • Bradycardia    • COPD (chronic obstructive pulmonary disease) (CMS/[facility])    • Coronary artery disease    • Dementia    • Disease of thyroid gland    • Generalized weakness 2016   • GERD (gastroesophageal reflux disease)    • Heart disease    • History of transfusion    • Hyperlipidemia    • Hypertension    • PAD (peripheral artery disease) (CMS/[facility])    • Stroke (CMS/[facility])     2016, 2017, 2017   • TIA (transient ischemic attack)    • Vertigo      Past Surgical History:   Procedure Laterality Date   • ANGIOPLASTY SUBCLAVIAN ARTERY     • CAROTID ENDARTERECTOMY Bilateral    • COLONOSCOPY     • COLONOSCOPY N/A 2017    Procedure: COLONOSCOPY  CPTCODE:34626;  Surgeon: Suraj Quintanilla III, MD;  Location: [facility];  Service:    • ENDOSCOPY     • ENDOSCOPY N/A 2017    Procedure: ESOPHAGOGASTRODUODENOSCOPY WITH BIOPSY  CPTCODE:07410;  Surgeon: Suraj Quintanilla III, MD;  Location: [facility];  Service:    • ENDOSCOPY W/ PEG TUBE PLACEMENT N/A 2017    Procedure: ESOPHAGOGASTRODUODENOSCOPY WITH PERCUTANEOUS ENDOSCOPIC GASTROSTOMY TUBE INSERTION;  Surgeon: Renan Montanez MD;  Location: [facility];  Service:    • EYE SURGERY      bilat cataracts   • RENAL ARTERY BYPASS       Family History   Problem Relation Age of Onset   • Heart disease Mother    • Diabetes Mother    • Cancer Father    • Diabetes Sister    • Cancer Brother      Social History     Tobacco Use   • Smoking status: Former Smoker     Packs/day: 1.00     Years: 40.00     Pack years: 40.00     Types: Cigarettes     Quit date: 2017     Years since quittin.5   • Smokeless tobacco:  "Never Used   Substance Use Topics   • Alcohol use: No     Comment: \"used to drink a lot\" none now  (per daughter)   • Drug use: No     Medications Prior to Admission   Medication Sig Dispense Refill Last Dose   • acetaminophen (TYLENOL) 500 MG tablet 500 mg by Per PEG Tube route Every 4 (Four) Hours As Needed for Mild Pain (1-3).      • albuterol (PROVENTIL HFA;VENTOLIN HFA) 108 (90 Base) MCG/ACT inhaler Inhale 2 puffs Every 6 (Six) Hours As Needed for Wheezing. 1 inhaler 0    • amLODIPine (NORVASC) 5 MG tablet 2 tablets by Per G Tube route Daily. 30 tablet 6    • apixaban (ELIQUIS) 5 MG tablet tablet Take 1 tablet by mouth 2 (Two) Times a Day. 60 tablet 4    • apixaban (ELIQUIS) 5 MG tablet tablet Take 1 tablet by mouth 2 (Two) Times a Day. 60 tablet 3    • aspirin 81 MG EC tablet Take 81 mg by mouth Daily.      • atenolol (TENORMIN) 25 MG tablet Take 25 mg by mouth Daily.      • atorvastatin (LIPITOR) 40 MG tablet Take 1 tablet by mouth Daily. (Patient taking differently: 40 mg by Per PEG Tube route Every Night.)      • donepezil (ARICEPT) 10 MG tablet Take 10 mg by mouth Every Night.      • DULoxetine (CYMBALTA) 60 MG capsule Take 60 mg by mouth At Night As Needed.      • ferrous sulfate (IRON SUPPLEMENT) 325 (65 FE) MG tablet Take 1 tablet by mouth 2 (Two) Times a Day With Meals. 60 tablet 0    • levothyroxine (SYNTHROID, LEVOTHROID) 25 MCG tablet 25 mcg by Per PEG Tube route Daily.      • loratadine (CLARITIN) 10 MG tablet Take 10 mg by mouth Daily.      • memantine (NAMENDA) 10 MG tablet Take 1 tablet by mouth 2 (two) times a day. (Patient taking differently: 10 mg by Per PEG Tube route 2 (Two) Times a Day.) 60 tablet 1    • mirtazapine (REMERON) 30 MG tablet 30 mg by Per PEG Tube route Every Night.      • Omega-3 Fatty Acids (FISH OIL) 1000 MG capsule capsule Take 1 capsule by mouth 3 (Three) Times a Day With Meals. 90 capsule 6    • pantoprazole (PROTONIX) 40 MG EC tablet Take 1 tablet by mouth 2 (Two) Times " a Day Before Meals. (Patient taking differently: 40 mg by Per PEG Tube route Daily.) 60 tablet 0    • QUEtiapine XR (SEROquel XR) 150 MG 24 hr tablet Take 150 mg by mouth Every Night.      • traZODone (DESYREL) 150 MG tablet Take 150 mg by mouth Every Night.        Allergies:  Patient has no known allergies.    Objective     Vital Signs  Temp:  [97.5 °F (36.4 °C)-98.5 °F (36.9 °C)] 97.5 °F (36.4 °C)  Heart Rate:  [65-89] 65  Resp:  [18-20] 18  BP: (151-194)/(71-84) 160/80  Body mass index is 24.9 kg/m².    Physical Exam:  Physical Exam  Constitutional:       General: She is not in acute distress.     Comments: Elderly patient, non-verbal   HENT:      Head: Normocephalic and atraumatic.      Right Ear: External ear normal.      Left Ear: External ear normal.      Nose: Nose normal.      Mouth/Throat:      Mouth: Mucous membranes are moist.      Pharynx: Oropharynx is clear.   Eyes:      Extraocular Movements: Extraocular movements intact.      Conjunctiva/sclera: Conjunctivae normal.      Pupils: Pupils are equal, round, and reactive to light.   Cardiovascular:      Rate and Rhythm: Normal rate and regular rhythm.      Pulses: Normal pulses.      Heart sounds: Normal heart sounds. No murmur heard.     Pulmonary:      Effort: Pulmonary effort is normal. No respiratory distress.      Breath sounds: Normal breath sounds.   Abdominal:      Comments: Somewhat firm; mild distention. Mild tenderness to palpation diffusely.    Musculoskeletal:         General: Normal range of motion.      Cervical back: Normal range of motion and neck supple. No tenderness.      Right lower leg: No edema.      Left lower leg: No edema.   Lymphadenopathy:      Cervical: No cervical adenopathy.   Skin:     General: Skin is warm and dry.   Neurological:      Comments: Able to lift left arm against gravity;  strength 5/5; able to lift left leg against gravity. Unable to lift right upper or lower extremity against gravity.    Psychiatric:       Comments: Difficult to assess given nonverbal state           Results Review:       Lab Results:  Results from last 7 days   Lab Units 09/03/21 2057   WBC 10*3/mm3 9.15   HEMOGLOBIN g/dL 11.1*   PLATELETS 10*3/mm3 223         Results from last 7 days   Lab Units 09/03/21 2057   SODIUM mmol/L 137   POTASSIUM mmol/L 5.2   CHLORIDE mmol/L 101   CO2 mmol/L 25.4   BUN mg/dL 28*   CREATININE mg/dL 1.37*   CALCIUM mg/dL 9.5   GLUCOSE mg/dL 134*         Hemoglobin A1C   Date Value Ref Range Status   09/03/2021 5.40 4.80 - 5.60 % Final     Results from last 7 days   Lab Units 09/03/21 2057   BILIRUBIN mg/dL 0.2   ALK PHOS U/L 127*   AST (SGOT) U/L 19   ALT (SGPT) U/L 13     Results from last 7 days   Lab Units 09/03/21  2245 09/03/21 2057   TROPONIN T ng/mL <0.010 <0.010                   I have reviewed the patient's laboratory results.    Imaging:  Imaging Results (Last 72 Hours)     Procedure Component Value Units Date/Time    XR Chest 1 View [[id number]] Collected: 09/03/21 2149     Updated: 09/03/21 2151    Narrative:      CR Chest 1 Vw    INDICATION:   Chest pain.     COMPARISON:    Chest x-ray from 1/4/2018    FINDINGS:  Portable AP view(s) of the chest.  The heart and mediastinal contours are normal. The lungs are unchanged with prominence to the pulmonary interstitium. No pneumothorax or pleural effusion.      Impression:      No acute cardiopulmonary findings.    Signer Name: Tianna Martinez MD   Signed: 9/3/2021 9:49 PM   Workstation Name: [id number]    [facility]          I have personally reviewed the patient's radiologic imaging.        EKG:  Sinus rhythm with marked sinus arrhythmia, HR 81, QTc 439  Minimal voltage criteria for LVH, may be normal variant  Borderline ECG  When compared with ECG of 04-JAN-2018 16:31,  No significant change was found  Confirmed by Jose Ruth ([id number]) on 9/3/2021 9:59:51 PM    I have personally reviewed the patient's EKG.        Assessment/Plan     - Chest  pain, difficult to discern if features more typical or atypical due to patient's nonverbal status. She has documented history of CAD (though to what extent is unclear) along with multiple cardiac risk factors including stroke, PAD, HTN, and prior longstanding smoking history before quitting in 2017. Received full dose ASA in ED; will continue with home baby ASA in the morning, along with statin. Continue to trend troponin. Evaluate further with ECHO and stress test in the morning.   - Abdominal pain: daughter reports patient has gone several days without bowel movement. STAT KUB ordered.   - Stage IIIb CKD: creatinine at baseline currently. Continue to monitor.  - Hypertension: currently BP stable. Review home meds once reconciled by pharmacy.  - COPD: appears compensated currently.   - Dementia: supportive care.   - History stroke/PFO on prior ECHO: continue home ASA and statin as noted above, along with eliquis (see below).   - History afib: currently in sinus rhythm. Continue home eliquis. Review home meds once reconciled by pharmacy.    DVT Prophylaxis: continue home eliquis    Estimated Length of Stay <2 midnights    I discussed the patient's findings, assessment and plan with the patient and lead RN Moris who was present during my interview and exam on [facility].     * Patient is high risk due to chest pain with multiple risk factors    Jameson Stewart MD  09/04/21  02:15 EDT

## 2022-09-03 ENCOUNTER — APPOINTMENT (OUTPATIENT)
Dept: CT IMAGING | Facility: HOSPITAL | Age: 77
End: 2022-09-03

## 2022-09-03 ENCOUNTER — HOSPITAL ENCOUNTER (EMERGENCY)
Facility: HOSPITAL | Age: 77
Discharge: HOME | End: 2022-09-03
Attending: STUDENT IN AN ORGANIZED HEALTH CARE EDUCATION/TRAINING PROGRAM | Admitting: STUDENT IN AN ORGANIZED HEALTH CARE EDUCATION/TRAINING PROGRAM

## 2022-09-03 ENCOUNTER — APPOINTMENT (OUTPATIENT)
Dept: GENERAL RADIOLOGY | Facility: HOSPITAL | Age: 77
End: 2022-09-03

## 2022-09-03 VITALS
WEIGHT: 155 LBS | HEIGHT: 67 IN | RESPIRATION RATE: 18 BRPM | SYSTOLIC BLOOD PRESSURE: 117 MMHG | OXYGEN SATURATION: 99 % | HEART RATE: 88 BPM | BODY MASS INDEX: 24.33 KG/M2 | DIASTOLIC BLOOD PRESSURE: 98 MMHG | TEMPERATURE: 98.5 F

## 2022-09-03 DIAGNOSIS — E27.8 LEFT ADRENAL MASS: ICD-10-CM

## 2022-09-03 DIAGNOSIS — K59.00 CONSTIPATION, UNSPECIFIED CONSTIPATION TYPE: ICD-10-CM

## 2022-09-03 DIAGNOSIS — U07.1 COVID-19: Primary | ICD-10-CM

## 2022-09-03 LAB
ALBUMIN SERPL-MCNC: 3.91 G/DL (ref 3.5–5.2)
ALBUMIN/GLOB SERPL: 0.9 G/DL
ALP SERPL-CCNC: 130 U/L (ref 39–117)
ALT SERPL W P-5'-P-CCNC: 15 U/L (ref 1–33)
ANION GAP SERPL CALCULATED.3IONS-SCNC: 14.7 MMOL/L (ref 5–15)
AST SERPL-CCNC: 20 U/L (ref 1–32)
BASOPHILS # BLD AUTO: 0.04 10*3/MM3 (ref 0–0.2)
BASOPHILS NFR BLD AUTO: 0.4 % (ref 0–1.5)
BILIRUB SERPL-MCNC: 0.3 MG/DL (ref 0–1.2)
BUN SERPL-MCNC: 21 MG/DL (ref 8–23)
BUN/CREAT SERPL: 17.5 (ref 7–25)
CALCIUM SPEC-SCNC: 10 MG/DL (ref 8.6–10.5)
CHLORIDE SERPL-SCNC: 102 MMOL/L (ref 98–107)
CO2 SERPL-SCNC: 22.3 MMOL/L (ref 22–29)
CREAT SERPL-MCNC: 1.2 MG/DL (ref 0.57–1)
CRP SERPL-MCNC: 2.33 MG/DL (ref 0–0.5)
DEPRECATED RDW RBC AUTO: 55 FL (ref 37–54)
EGFRCR SERPLBLD CKD-EPI 2021: 46.7 ML/MIN/1.73
EOSINOPHIL # BLD AUTO: 0.16 10*3/MM3 (ref 0–0.4)
EOSINOPHIL NFR BLD AUTO: 1.7 % (ref 0.3–6.2)
ERYTHROCYTE [DISTWIDTH] IN BLOOD BY AUTOMATED COUNT: 17.7 % (ref 12.3–15.4)
FLUAV SUBTYP SPEC NAA+PROBE: NOT DETECTED
FLUBV RNA ISLT QL NAA+PROBE: NOT DETECTED
GLOBULIN UR ELPH-MCNC: 4.2 GM/DL
GLUCOSE SERPL-MCNC: 132 MG/DL (ref 65–99)
HCT VFR BLD AUTO: 39.8 % (ref 34–46.6)
HGB BLD-MCNC: 12 G/DL (ref 12–15.9)
HOLD SPECIMEN: NORMAL
HOLD SPECIMEN: NORMAL
IMM GRANULOCYTES # BLD AUTO: 0.08 10*3/MM3 (ref 0–0.05)
IMM GRANULOCYTES NFR BLD AUTO: 0.9 % (ref 0–0.5)
LYMPHOCYTES # BLD AUTO: 0.9 10*3/MM3 (ref 0.7–3.1)
LYMPHOCYTES NFR BLD AUTO: 9.6 % (ref 19.6–45.3)
MAGNESIUM SERPL-MCNC: 2.1 MG/DL (ref 1.6–2.4)
MCH RBC QN AUTO: 25.6 PG (ref 26.6–33)
MCHC RBC AUTO-ENTMCNC: 30.2 G/DL (ref 31.5–35.7)
MCV RBC AUTO: 84.9 FL (ref 79–97)
MONOCYTES # BLD AUTO: 0.75 10*3/MM3 (ref 0.1–0.9)
MONOCYTES NFR BLD AUTO: 8 % (ref 5–12)
NEUTROPHILS NFR BLD AUTO: 7.4 10*3/MM3 (ref 1.7–7)
NEUTROPHILS NFR BLD AUTO: 79.4 % (ref 42.7–76)
NRBC BLD AUTO-RTO: 0 /100 WBC (ref 0–0.2)
NT-PROBNP SERPL-MCNC: 150.2 PG/ML (ref 0–1800)
PLATELET # BLD AUTO: 199 10*3/MM3 (ref 140–450)
PMV BLD AUTO: 9 FL (ref 6–12)
POTASSIUM SERPL-SCNC: 4.7 MMOL/L (ref 3.5–5.2)
PROT SERPL-MCNC: 8.1 G/DL (ref 6–8.5)
RBC # BLD AUTO: 4.69 10*6/MM3 (ref 3.77–5.28)
SARS-COV-2 RNA PNL SPEC NAA+PROBE: DETECTED
SODIUM SERPL-SCNC: 139 MMOL/L (ref 136–145)
TROPONIN T SERPL-MCNC: <0.01 NG/ML (ref 0–0.03)
WBC NRBC COR # BLD: 9.33 10*3/MM3 (ref 3.4–10.8)
WHOLE BLOOD HOLD COAG: NORMAL
WHOLE BLOOD HOLD SPECIMEN: NORMAL

## 2022-09-03 PROCEDURE — 87636 SARSCOV2 & INF A&B AMP PRB: CPT | Performed by: STUDENT IN AN ORGANIZED HEALTH CARE EDUCATION/TRAINING PROGRAM

## 2022-09-03 PROCEDURE — 85025 COMPLETE CBC W/AUTO DIFF WBC: CPT | Performed by: STUDENT IN AN ORGANIZED HEALTH CARE EDUCATION/TRAINING PROGRAM

## 2022-09-03 PROCEDURE — 86140 C-REACTIVE PROTEIN: CPT | Performed by: STUDENT IN AN ORGANIZED HEALTH CARE EDUCATION/TRAINING PROGRAM

## 2022-09-03 PROCEDURE — 83880 ASSAY OF NATRIURETIC PEPTIDE: CPT | Performed by: STUDENT IN AN ORGANIZED HEALTH CARE EDUCATION/TRAINING PROGRAM

## 2022-09-03 PROCEDURE — 36415 COLL VENOUS BLD VENIPUNCTURE: CPT

## 2022-09-03 PROCEDURE — 93010 ELECTROCARDIOGRAM REPORT: CPT | Performed by: INTERNAL MEDICINE

## 2022-09-03 PROCEDURE — 71045 X-RAY EXAM CHEST 1 VIEW: CPT | Performed by: RADIOLOGY

## 2022-09-03 PROCEDURE — 93005 ELECTROCARDIOGRAM TRACING: CPT | Performed by: STUDENT IN AN ORGANIZED HEALTH CARE EDUCATION/TRAINING PROGRAM

## 2022-09-03 PROCEDURE — 84484 ASSAY OF TROPONIN QUANT: CPT | Performed by: STUDENT IN AN ORGANIZED HEALTH CARE EDUCATION/TRAINING PROGRAM

## 2022-09-03 PROCEDURE — 80053 COMPREHEN METABOLIC PANEL: CPT | Performed by: STUDENT IN AN ORGANIZED HEALTH CARE EDUCATION/TRAINING PROGRAM

## 2022-09-03 PROCEDURE — 74176 CT ABD & PELVIS W/O CONTRAST: CPT

## 2022-09-03 PROCEDURE — 83735 ASSAY OF MAGNESIUM: CPT | Performed by: STUDENT IN AN ORGANIZED HEALTH CARE EDUCATION/TRAINING PROGRAM

## 2022-09-03 PROCEDURE — 71045 X-RAY EXAM CHEST 1 VIEW: CPT

## 2022-09-03 PROCEDURE — 99284 EMERGENCY DEPT VISIT MOD MDM: CPT

## 2022-09-03 RX ORDER — NIRMATRELVIR AND RITONAVIR 150-100 MG
2 KIT ORAL 2 TIMES DAILY
Qty: 20 TABLET | Refills: 0 | Status: SHIPPED | OUTPATIENT
Start: 2022-09-03 | End: 2022-09-08

## 2022-09-03 RX ORDER — QUETIAPINE FUMARATE 25 MG/1
12.5 TABLET, FILM COATED ORAL NIGHTLY
Qty: 3 TABLET | Refills: 0 | Status: SHIPPED | OUTPATIENT
Start: 2022-09-03 | End: 2022-09-08

## 2022-09-03 RX ORDER — QUETIAPINE FUMARATE 25 MG/1
12.5 TABLET, FILM COATED ORAL NIGHTLY
Qty: 3 TABLET | Refills: 0 | Status: SHIPPED | OUTPATIENT
Start: 2022-09-03 | End: 2022-09-03 | Stop reason: SDUPTHER

## 2022-09-03 RX ORDER — NIRMATRELVIR AND RITONAVIR 150-100 MG
2 KIT ORAL 2 TIMES DAILY
Qty: 20 TABLET | Refills: 0 | Status: SHIPPED | OUTPATIENT
Start: 2022-09-03 | End: 2022-09-03 | Stop reason: SDUPTHER

## 2022-09-03 NOTE — DISCHARGE INSTRUCTIONS
Significant medication interactions exist with Paxlovid.     While taking Paxlovid:  - Recommend hold trazodone and atorvastatin.   - Reduce Seroquel to 1/6 over the current dose amount, prescription provided for Seroquel.  We are unable to prescribe the appropriate dose of extended release instead we are prescribing a nighttime dose of normal release at 12.5 mg.  - Take only half of the normal dose, or half a pill of Eliquis twice daily.    Do not hesitate to return for any new onset or worsening symptoms.    CT imaging showed evidence of left adrenal nodule unchanged since 2018, likely benign or harmless.    CT imaging also noted constipation.  Recommend using MiraLAX titrated to 1-2 soft stools daily.  You may use a more significant amount up to 4-5 capfuls daily for 1-2 doses to initially treat severe constipation.  Please follow-up with drinking plenty of fluids does not has dehydration.

## 2022-09-03 NOTE — ED PROVIDER NOTES
[facility]  emergency department encounter        Pt Name: Sunshine Her  MRN: [id number]  Birthdate 1945  Date of evaluation: 9/3/2022    Chief Complaint   Patient presents with   • Shortness of Breath   • Abdominal Pain             HISTORY OF PRESENT ILLNESS:   Sunshine Her is a 76 y.o. female PMH HTN, HLD, GERD, CVA, COPD, PAD, arthritis, atrial fibrillation.  Anticoagulated on Eliquis.  Patient aphasic from CVA.    Patient presents with daughter reporting patient has been more short of breath over the past couple of days.  Also noted patient had been grabbing her abdomen and noted a cough.  Noted slotchy discoloration of the face.    No other exacerbating or alleviating factors other than as noted above.  Severity: Severe    PCP: Pati Escobedo, [place]          REVIEW OF SYSTEMS:     Review of Systems   Unable to perform ROS: Dementia       Review of systems otherwise as per HPI.          PREVIOUS HISTORY:         Past Medical History:   Diagnosis Date   • A-fib (CMS/Formerly [facility])    • Acute on chronic renal insufficiency 7/15/2016   • Anemia    • Anxiety    • Arthritis    • Arthritis    • Bipolar 1 disorder (CMS/Formerly [facility])    • Bradycardia    • COPD (chronic obstructive pulmonary disease) (CMS/Formerly [facility])    • Coronary artery disease    • Dementia    • Disease of thyroid gland    • Generalized weakness 7/14/2016   • GERD (gastroesophageal reflux disease)    • Heart disease    • History of transfusion    • Hyperlipidemia    • Hypertension    • PAD (peripheral artery disease) (CMS/Formerly [facility])    • Stroke (CMS/Formerly [facility])     8/2016, 2/2017, 6/2017   • TIA (transient ischemic attack)    • Vertigo            Past Surgical History:   Procedure Laterality Date   • ANGIOPLASTY SUBCLAVIAN ARTERY     • CAROTID ENDARTERECTOMY Bilateral    • COLONOSCOPY  2012   • COLONOSCOPY N/A 2/24/2017    Procedure: COLONOSCOPY  CPTCODE:39610;  Surgeon: Suraj Quintanilla III, MD;  Location: [facility];  Service:    • ENDOSCOPY     • ENDOSCOPY N/A  "2017    Procedure: ESOPHAGOGASTRODUODENOSCOPY WITH BIOPSY  CPTCODE:24334;  Surgeon: Suraj Quintanilla III, MD;  Location: [facility] OR;  Service:    • ENDOSCOPY W/ PEG TUBE PLACEMENT N/A 2017    Procedure: ESOPHAGOGASTRODUODENOSCOPY WITH PERCUTANEOUS ENDOSCOPIC GASTROSTOMY TUBE INSERTION;  Surgeon: Renan Montanez MD;  Location: [facility] OR;  Service:    • EYE SURGERY      bilat cataracts   • RENAL ARTERY BYPASS             Social History     Socioeconomic History   • Marital status:    Tobacco Use   • Smoking status: Former Smoker     Packs/day: 1.00     Years: 40.00     Pack years: 40.00     Types: Cigarettes     Quit date: 2017     Years since quittin.5   • Smokeless tobacco: Never Used   Substance and Sexual Activity   • Alcohol use: No     Comment: \"used to drink a lot\" none now  (per daughter)   • Drug use: No   • Sexual activity: Defer           Family History   Problem Relation Age of Onset   • Heart disease Mother    • Diabetes Mother    • Cancer Father    • Diabetes Sister    • Cancer Brother          Current Outpatient Medications   Medication Instructions   • acetaminophen (TYLENOL) 500 mg, Per PEG Tube, Every 4 Hours PRN   • albuterol (PROVENTIL HFA;VENTOLIN HFA) 108 (90 Base) MCG/ACT inhaler 2 puffs, Inhalation, Every 6 Hours PRN   • amLODIPine (NORVASC) 10 mg, Per G Tube, Every 24 Hours Scheduled   • apixaban (ELIQUIS) 5 mg, Oral, 2 Times Daily   • apixaban (ELIQUIS) 5 mg, Oral, 2 Times Daily   • aspirin 81 mg, Oral, Daily   • atenolol (TENORMIN) 25 mg, Oral, Daily   • atorvastatin (LIPITOR) 40 mg, Oral, Daily   • donepezil (ARICEPT) 10 mg, Oral, Nightly   • DULoxetine (CYMBALTA) 60 mg, Oral, Nightly PRN   • ferrous sulfate (IRON SUPPLEMENT) 325 mg, Oral, 2 Times Daily With Meals   • fish oil 1,000 mg, Oral, 3 Times Daily With Meals   • levothyroxine (SYNTHROID, LEVOTHROID) 25 mcg, Per PEG Tube, Daily   • loratadine (CLARITIN) 10 mg, Oral, Daily   • memantine (NAMENDA) 10 " mg, Oral, 2 Times Daily   • mirtazapine (REMERON) 30 mg, Per PEG Tube, Nightly   • Nirmatrelvir&Ritonavir 150/100 (Paxlovid, 150/100,) 10 x 150 MG & 10 x 100MG tablet therapy pack tablet (for renal adjustment) 2 each, Oral, 2 Times Daily   • pantoprazole (PROTONIX) 40 mg, Oral, 2 Times Daily Before Meals   • QUEtiapine (SEROQUEL) 12.5 mg, Oral, Nightly   • QUEtiapine XR (SEROQUEL XR) 150 mg, Oral, Nightly   • traZODone (DESYREL) 150 mg, Oral, Nightly         Allergies:  Patient has no known allergies.    Medications, allergies and past medical, surgical, family, and social history reviewed.        PHYSICAL EXAM:     Physical Exam  Vitals and nursing note reviewed.   Constitutional:       General: She is not in acute distress.  HENT:      Head: Normocephalic and atraumatic.   Eyes:      Extraocular Movements: Extraocular movements intact.      Conjunctiva/sclera: Conjunctivae normal.   Cardiovascular:      Rate and Rhythm: Normal rate and regular rhythm.   Pulmonary:      Effort: Pulmonary effort is normal. No respiratory distress.      Breath sounds: No stridor. No wheezing, rhonchi or rales.      Comments: Saturating upper 90s with no respiratory distress.  Resting calmly.  Lungs clear to auscultation bilaterally with no wheeze crackles or expiratory prolongation.  Abdominal:      General: Abdomen is flat. There is no distension.      Palpations: Abdomen is soft.      Tenderness: There is no abdominal tenderness. There is no guarding or rebound.      Comments: Abdomen soft nontender   Musculoskeletal:         General: No deformity. Normal range of motion.      Cervical back: Normal range of motion. No rigidity.   Neurological:      General: No focal deficit present.      Mental Status: She is alert. She is disoriented.      Comments: Aphasic, no speech, unable to follow commands.   Psychiatric:         Mood and Affect: Mood normal.         Behavior: Behavior normal.             COMPLETED DIAGNOSTIC STUDIES AND  INTERVENTIONS:     Lab Results (last 24 hours)     Procedure Component Value Units Date/Time    CBC & Differential [[id number]]  (Abnormal) Collected: 09/03/22 1403    Specimen: Blood from Hand, Left Updated: 09/03/22 1414    Narrative:      The following orders were created for panel order CBC & Differential.  Procedure                               Abnormality         Status                     ---------                               -----------         ------                     CBC Auto Differential[[id number]]        Abnormal            Final result                 Please view results for these tests on the individual orders.    Comprehensive Metabolic Panel [[id number]]  (Abnormal) Collected: 09/03/22 1403    Specimen: Blood from Hand, Left Updated: 09/03/22 1446     Glucose 132 mg/dL      BUN 21 mg/dL      Creatinine 1.20 mg/dL      Sodium 139 mmol/L      Potassium 4.7 mmol/L      Chloride 102 mmol/L      CO2 22.3 mmol/L      Calcium 10.0 mg/dL      Total Protein 8.1 g/dL      Albumin 3.91 g/dL      ALT (SGPT) 15 U/L      AST (SGOT) 20 U/L      Alkaline Phosphatase 130 U/L      Total Bilirubin 0.3 mg/dL      Globulin 4.2 gm/dL      A/G Ratio 0.9 g/dL      BUN/Creatinine Ratio 17.5     Anion Gap 14.7 mmol/L      eGFR 46.7 mL/min/1.73      Comment: National Kidney Foundation and American Society of Nephrology (ASN) Task Force recommended calculation based on the Chronic Kidney Disease Epidemiology Collaboration (CKD-EPI) equation refit without adjustment for race.       Narrative:      GFR Normal >60  Chronic Kidney Disease <60  Kidney Failure <15      BNP [[id number]]  (Normal) Collected: 09/03/22 1403    Specimen: Blood from Hand, Left Updated: 09/03/22 1444     proBNP 150.2 pg/mL     Narrative:      Among patients with dyspnea, NT-proBNP is highly sensitive for the detection of acute congestive heart failure. In addition NT-proBNP of <300 pg/ml effectively rules out acute congestive heart failure with 99%  negative predictive value.    Results may be falsely decreased if patient taking Biotin.      C-reactive Protein [[id number]]  (Abnormal) Collected: 09/03/22 1403    Specimen: Blood from Hand, Left Updated: 09/03/22 1446     C-Reactive Protein 2.33 mg/dL     Magnesium [[id number]]  (Normal) Collected: 09/03/22 1403    Specimen: Blood from Hand, Left Updated: 09/03/22 1446     Magnesium 2.1 mg/dL     CBC Auto Differential [[id number]]  (Abnormal) Collected: 09/03/22 1403    Specimen: Blood from Hand, Left Updated: 09/03/22 1414     WBC 9.33 10*3/mm3      RBC 4.69 10*6/mm3      Hemoglobin 12.0 g/dL      Hematocrit 39.8 %      MCV 84.9 fL      MCH 25.6 pg      MCHC 30.2 g/dL      RDW 17.7 %      RDW-SD 55.0 fl      MPV 9.0 fL      Platelets 199 10*3/mm3      Neutrophil % 79.4 %      Lymphocyte % 9.6 %      Monocyte % 8.0 %      Eosinophil % 1.7 %      Basophil % 0.4 %      Immature Grans % 0.9 %      Neutrophils, Absolute 7.40 10*3/mm3      Lymphocytes, Absolute 0.90 10*3/mm3      Monocytes, Absolute 0.75 10*3/mm3      Eosinophils, Absolute 0.16 10*3/mm3      Basophils, Absolute 0.04 10*3/mm3      Immature Grans, Absolute 0.08 10*3/mm3      nRBC 0.0 /100 WBC     Troponin [[id number]]  (Normal) Collected: 09/03/22 1403    Specimen: Blood from Hand, Left Updated: 09/03/22 1446     Troponin T <0.010 ng/mL     Narrative:      Troponin T Reference Range:  <= 0.03 ng/mL-   Negative for AMI  >0.03 ng/mL-     Abnormal for myocardial necrosis.  Clinicians would have to utilize clinical acumen, EKG, Troponin and serial changes to determine if it is an Acute Myocardial Infarction or myocardial injury due to an underlying chronic condition.       Results may be falsely decreased if patient taking Biotin.      COVID PRE-OP / PRE-PROCEDURE SCREENING ORDER (NO ISOLATION) - Swab, Nasopharynx [[id number]]  (Abnormal) Collected: 09/03/22 1415    Specimen: Swab from Nasopharynx Updated: 09/03/22 1508    Narrative:      The following orders  were created for panel order COVID PRE-OP / PRE-PROCEDURE SCREENING ORDER (NO ISOLATION) - Swab, Nasopharynx.  Procedure                               Abnormality         Status                     ---------                               -----------         ------                     COVID-19 and FLU A/B PCR...[[id number]]  Abnormal            Final result                 Please view results for these tests on the individual orders.    COVID-19 and FLU A/B PCR - Swab, Nasopharynx [[id number]]  (Abnormal) Collected: 09/03/22 1415    Specimen: Swab from Nasopharynx Updated: 09/03/22 1508     COVID19 Detected     Influenza A PCR Not Detected     Influenza B PCR Not Detected    Narrative:      Fact sheet for providers: https://www.fda.gov/media/530398/download    Fact sheet for patients: https://www.fda.gov/media/391813/download    Test performed by PCR.  Influenza A and Influenza B negative results should be considered presumptive in samples that have a positive SARS-CoV-2 result.    Competitive inhibition studies showed that SARS-CoV-2 virus, when present at concentrations above 3.6E+04 copies/mL, can inhibit the detection and amplification of influenza A and influenza B virus RNA if present at or below 1.8E+02 copies/mL or 4.9E+02 copies/mL, respectively, and may lead to false negative influenza virus results. If co-infection with influenza A or influenza B virus is suspected in samples with a positive SARS-CoV-2 result, the sample should be re-tested with another FDA cleared, approved, or authorized influenza test, if influenza virus detection would change clinical management.            CT Abdomen Pelvis Without Contrast   Final Result      1.  No acute CT abnormality in the abdomen or pelvis.   2.  Hepatic steatosis.   3.  2.4 cm left adrenal nodule, not significant changed since 2018, likely benign adenoma given stability.   4.  Mild colonic diverticulosis.   5.  Increased rectal stool burden.         Signer Name:  Mitchell Eugene MD    Signed: 9/3/2022 4:35 PM    Workstation Name: [id number]     [facility]      XR Chest 1 View   Final Result   No radiographic evidence of acute cardiac or pulmonary disease.       This report was finalized on 9/3/2022 1:31 PM by Dr. Greg Morton MD.                New Medications Ordered This Visit   Medications   • QUEtiapine (SEROquel) 25 MG tablet     Sig: Take 0.5 tablets by mouth Every Night for 5 days.     Dispense:  3 tablet     Refill:  0   • Nirmatrelvir&Ritonavir 150/100 (Paxlovid, 150/100,) 10 x 150 MG & 10 x 100MG tablet therapy pack tablet (for renal adjustment)     Sig: Take 2 tablets by mouth 2 (Two) Times a Day for 5 days.     Dispense:  20 tablet     Refill:  0     DO NOT use in patients < 12 years or in patients with eGFR < 30.  Dose adjustment to 2 tablets BID required for patients with eGFR >/= 30 - < 60.     Order Specific Question:   Verbal consent obtained. Patient informed of alternative therapies available and that this is a drug authorized for use under EUA.     Answer:   No     Order Specific Question:   I have reviewed the patient's medication list prior to prescribing Paxlovid due to the risk of serious adverse reactions secondary to drug interactions. I will consult with pharmacy, if needed     Answer:   Patient's medication list reviewed     Order Specific Question:   I confirm this patient is >/= 12 years of age AND weighs >/= 40 kg and understand this is required for prescribing under the PAXLOVID emergency use authorization.     Answer:   I have confirmed patient age and weight meet EUA criteria     Order Specific Question:   Please select this patient's eGFR     Answer:   eGFR >/= 30 to < 60     Order Specific Question:   Does the patient require hospitalization due to severe or critical COVID-19?     Answer:   No     Order Specific Question:   Does the patient have a confirmed COVID-19 infection, are within 5 days of symptom onset, AND are  at high risk for progression to severe COVID-19, including hospitalization or death?     Answer:   Yes     Order Specific Question:   Patient must be considered high risk for progressing to severe COVID-19 and/or hospitalization per the EUA. Please select all of the following high risk criteria that apply. (SEE LINK ABOVE for list of immunocompromising conditions)     Answer:   Age >/= 65 years         Procedures            MEDICAL DECISION-MAKING AND ED COURSE:     ED Course as of 09/03/22 1728   Sat Sep 03, 2022   1339 EKG at 1337 NSR 95 bpm, , QRS 76, QTc 439, regular axis, no STEMI.  Significant artifact obscuring read. [KP]   1429 MDM: 76-year-old female, aphasic presents for reported worsening shortness of breath over the past couple of days.  Daughter also concerned about potential abdominal pain and splotchy discoloration of the face which is not appreciated at this time.  Patient has no respiratory distress, lungs clear, no significant leg swelling.  No clear shortness of breath at this time and exam unrevealing.  Broad work-up implemented as patient is unable to express her problems or needs. [KP]   1430 WBC: 9.33 [KP]   1430 Hemoglobin: 12.0 [KP]   1430 Platelets: 199 [KP]   1547 COVID19(!!): Detected [KP]   1547 Discussed with daughter regarding Paxlovid and EUA.  She would like to proceed with medication. Will renally dose. [KP]   1721 CT Abdomen Pelvis Without Contrast  1.  No acute CT abnormality in the abdomen or pelvis.  2.  Hepatic steatosis.  3.  2.4 cm left adrenal nodule, not significant changed since 2018, likely benign adenoma given stability.  4.  Mild colonic diverticulosis.  5.  Increased rectal stool burden. [KP]   1727 Provide recommendations for constipation, noted incidental finding of adrenal nodule.  Due to Paxlovid interactions recommended use only half dose of Eliquis, prescribed 1/6 dose of Seroquel nightly per pharmacy recs, recommended hold trazodone and atorvastatin.   Daughter agreeable plan, all questions answered. [KP]      ED Course User Index  [KP] Ke Hagen MD       ?      FINAL IMPRESSION:       1. COVID-19    2. Constipation, unspecified constipation type    3. Left adrenal mass (HCC)         The complaints listed here are new problems to this examiner.      FOLLOW-UP  Pati Escobedo, APRN  [id number]  [place] [address]  [phone]    Schedule an appointment as soon as possible for a visit         DISPOSITION  ED Disposition     ED Disposition   Discharge    Condition   Stable    Comment   --                   This care is provided during an unprecedented national emergency due to the Novel Coronavirus (COVID-19). COVID-19 infections and transmission risks place heavy strains on healthcare resources. As this pandemic evolves, the Hospital and providers strive to respond fluidly, to remain operational, and to provide care relative to available resources and information. Outcomes are unpredictable and treatments are without well-defined guidelines. Further, the impact of COVID-19 on all aspects of emergency care, including the impact to patients seeking care for reasons other than COVID-19, is unavoidable during this national emergency.    This note was dictated using a [id number] tool. Occasional wrong-word or 'sound-a-like' substitutions may have occurred due to the inherent limitations of voice recognition software. ?Read the chart carefully and recognize, using context, where substitutions have occurred.    Ke Hagen MD  17:28 EDT  9/3/2022             Ke Hagen MD  09/03/22 0520

## 2022-09-04 LAB
QT INTERVAL: 350 MS
QTC INTERVAL: 439 MS

## 2023-10-10 ENCOUNTER — HOSPITAL ENCOUNTER (EMERGENCY)
Facility: HOSPITAL | Age: 78
Discharge: LEFT BEFORE DISCHARGE | End: 2023-10-10
Admitting: STUDENT IN AN ORGANIZED HEALTH CARE EDUCATION/TRAINING PROGRAM
Payer: MEDICARE

## 2023-10-10 VITALS
HEIGHT: 67 IN | DIASTOLIC BLOOD PRESSURE: 68 MMHG | BODY MASS INDEX: 25.11 KG/M2 | HEART RATE: 104 BPM | TEMPERATURE: 97.7 F | WEIGHT: 160 LBS | RESPIRATION RATE: 20 BRPM | OXYGEN SATURATION: 99 % | SYSTOLIC BLOOD PRESSURE: 114 MMHG

## 2023-10-10 PROCEDURE — 93005 ELECTROCARDIOGRAM TRACING: CPT | Performed by: PHYSICIAN ASSISTANT

## 2023-10-10 PROCEDURE — 99283 EMERGENCY DEPT VISIT LOW MDM: CPT

## 2023-10-10 PROCEDURE — 93010 ELECTROCARDIOGRAM REPORT: CPT | Performed by: INTERNAL MEDICINE

## 2023-10-10 NOTE — ED NOTES
MEDICAL SCREENING:    Reason for Visit: pt has fever, soa, and weakness     Patient initially seen in triage.  The patient was advised further evaluation and diagnostic testing will be needed, some of the treatment and testing will be initiated in the lobby in order to begin the process.  The patient will be returned to the waiting area for the time being and possibly be re-assessed by a subsequent ED provider.  The patient will be brought back to the treatment area in as timely manner as possible.      Tianna Young PA  10/10/23 0526

## 2023-10-11 LAB
QT INTERVAL: 324 MS
QTC INTERVAL: 434 MS

## 2023-10-12 ENCOUNTER — HOSPITAL ENCOUNTER (OUTPATIENT)
Dept: GENERAL RADIOLOGY | Facility: HOSPITAL | Age: 78
Discharge: HOME | End: 2023-10-12
Payer: MEDICARE

## 2023-10-12 ENCOUNTER — LAB (OUTPATIENT)
Dept: LAB | Facility: HOSPITAL | Age: 78
End: 2023-10-12
Payer: MEDICARE

## 2023-10-12 ENCOUNTER — TRANSCRIBE ORDERS (OUTPATIENT)
Dept: ADMINISTRATIVE | Facility: HOSPITAL | Age: 78
End: 2023-10-12
Payer: MEDICARE

## 2023-10-12 DIAGNOSIS — R50.9 FEVER, UNSPECIFIED: ICD-10-CM

## 2023-10-12 DIAGNOSIS — R50.9 FEVER, UNSPECIFIED: Primary | ICD-10-CM

## 2023-10-12 LAB
ALBUMIN SERPL-MCNC: 3.5 G/DL (ref 3.5–5.2)
ALBUMIN/GLOB SERPL: 0.9 G/DL
ALP SERPL-CCNC: 125 U/L (ref 39–117)
ALT SERPL W P-5'-P-CCNC: 13 U/L (ref 1–33)
ANION GAP SERPL CALCULATED.3IONS-SCNC: 10.3 MMOL/L (ref 5–15)
AST SERPL-CCNC: 19 U/L (ref 1–32)
BASOPHILS # BLD AUTO: 0.04 10*3/MM3 (ref 0–0.2)
BASOPHILS NFR BLD AUTO: 0.6 % (ref 0–1.5)
BILIRUB SERPL-MCNC: 0.2 MG/DL (ref 0–1.2)
BUN SERPL-MCNC: 29 MG/DL (ref 8–23)
BUN/CREAT SERPL: 22.1 (ref 7–25)
CALCIUM SPEC-SCNC: 9 MG/DL (ref 8.6–10.5)
CHLORIDE SERPL-SCNC: 104 MMOL/L (ref 98–107)
CO2 SERPL-SCNC: 24.7 MMOL/L (ref 22–29)
CREAT SERPL-MCNC: 1.31 MG/DL (ref 0.57–1)
DEPRECATED RDW RBC AUTO: 49.1 FL (ref 37–54)
EGFRCR SERPLBLD CKD-EPI 2021: 41.8 ML/MIN/1.73
EOSINOPHIL # BLD AUTO: 0.2 10*3/MM3 (ref 0–0.4)
EOSINOPHIL NFR BLD AUTO: 2.9 % (ref 0.3–6.2)
ERYTHROCYTE [DISTWIDTH] IN BLOOD BY AUTOMATED COUNT: 14.6 % (ref 12.3–15.4)
GLOBULIN UR ELPH-MCNC: 4.1 GM/DL
GLUCOSE SERPL-MCNC: 164 MG/DL (ref 65–99)
HCT VFR BLD AUTO: 37.4 % (ref 34–46.6)
HGB BLD-MCNC: 11.4 G/DL (ref 12–15.9)
IMM GRANULOCYTES # BLD AUTO: 0.06 10*3/MM3 (ref 0–0.05)
IMM GRANULOCYTES NFR BLD AUTO: 0.9 % (ref 0–0.5)
LYMPHOCYTES # BLD AUTO: 1.87 10*3/MM3 (ref 0.7–3.1)
LYMPHOCYTES NFR BLD AUTO: 27.2 % (ref 19.6–45.3)
MCH RBC QN AUTO: 28 PG (ref 26.6–33)
MCHC RBC AUTO-ENTMCNC: 30.5 G/DL (ref 31.5–35.7)
MCV RBC AUTO: 91.9 FL (ref 79–97)
MONOCYTES # BLD AUTO: 0.46 10*3/MM3 (ref 0.1–0.9)
MONOCYTES NFR BLD AUTO: 6.7 % (ref 5–12)
NEUTROPHILS NFR BLD AUTO: 4.25 10*3/MM3 (ref 1.7–7)
NEUTROPHILS NFR BLD AUTO: 61.7 % (ref 42.7–76)
NRBC BLD AUTO-RTO: 0 /100 WBC (ref 0–0.2)
PLATELET # BLD AUTO: 206 10*3/MM3 (ref 140–450)
PMV BLD AUTO: 9 FL (ref 6–12)
POTASSIUM SERPL-SCNC: 4.4 MMOL/L (ref 3.5–5.2)
PROT SERPL-MCNC: 7.6 G/DL (ref 6–8.5)
RBC # BLD AUTO: 4.07 10*6/MM3 (ref 3.77–5.28)
SODIUM SERPL-SCNC: 139 MMOL/L (ref 136–145)
WBC NRBC COR # BLD: 6.88 10*3/MM3 (ref 3.4–10.8)

## 2023-10-12 PROCEDURE — 80053 COMPREHEN METABOLIC PANEL: CPT

## 2023-10-12 PROCEDURE — 36415 COLL VENOUS BLD VENIPUNCTURE: CPT

## 2023-10-12 PROCEDURE — 85025 COMPLETE CBC W/AUTO DIFF WBC: CPT

## 2023-10-12 PROCEDURE — 71046 X-RAY EXAM CHEST 2 VIEWS: CPT

## 2024-06-10 ENCOUNTER — APPOINTMENT (OUTPATIENT)
Dept: CT IMAGING | Facility: HOSPITAL | Age: 79
End: 2024-06-10
Payer: MEDICARE

## 2024-06-10 ENCOUNTER — HOSPITAL ENCOUNTER (EMERGENCY)
Facility: HOSPITAL | Age: 79
Discharge: HOME | End: 2024-06-11
Attending: EMERGENCY MEDICINE | Admitting: EMERGENCY MEDICINE
Payer: MEDICARE

## 2024-06-10 ENCOUNTER — APPOINTMENT (OUTPATIENT)
Dept: GENERAL RADIOLOGY | Facility: HOSPITAL | Age: 79
End: 2024-06-10
Payer: MEDICARE

## 2024-06-10 DIAGNOSIS — K56.41 FECAL IMPACTION: Primary | ICD-10-CM

## 2024-06-10 LAB
ALBUMIN SERPL-MCNC: 3.7 G/DL (ref 3.5–5.2)
ALBUMIN/GLOB SERPL: 1 G/DL
ALP SERPL-CCNC: 107 U/L (ref 39–117)
ALT SERPL W P-5'-P-CCNC: 11 U/L (ref 1–33)
ANION GAP SERPL CALCULATED.3IONS-SCNC: 12.9 MMOL/L (ref 5–15)
AST SERPL-CCNC: 16 U/L (ref 1–32)
BACTERIA UR QL AUTO: ABNORMAL /HPF
BASOPHILS # BLD AUTO: 0.04 10*3/MM3 (ref 0–0.2)
BASOPHILS NFR BLD AUTO: 0.4 % (ref 0–1.5)
BILIRUB SERPL-MCNC: <0.2 MG/DL (ref 0–1.2)
BILIRUB UR QL STRIP: NEGATIVE
BUN SERPL-MCNC: 49 MG/DL (ref 8–23)
BUN/CREAT SERPL: 38.6 (ref 7–25)
CALCIUM SPEC-SCNC: 9.3 MG/DL (ref 8.6–10.5)
CHLORIDE SERPL-SCNC: 102 MMOL/L (ref 98–107)
CLARITY UR: CLEAR
CO2 SERPL-SCNC: 22.1 MMOL/L (ref 22–29)
COLOR UR: YELLOW
CREAT SERPL-MCNC: 1.27 MG/DL (ref 0.57–1)
DEPRECATED RDW RBC AUTO: 49.2 FL (ref 37–54)
EGFRCR SERPLBLD CKD-EPI 2021: 43.1 ML/MIN/1.73
EOSINOPHIL # BLD AUTO: 0.3 10*3/MM3 (ref 0–0.4)
EOSINOPHIL NFR BLD AUTO: 3 % (ref 0.3–6.2)
ERYTHROCYTE [DISTWIDTH] IN BLOOD BY AUTOMATED COUNT: 17.1 % (ref 12.3–15.4)
GLOBULIN UR ELPH-MCNC: 3.8 GM/DL
GLUCOSE SERPL-MCNC: 133 MG/DL (ref 65–99)
GLUCOSE UR STRIP-MCNC: NEGATIVE MG/DL
HCT VFR BLD AUTO: 35.8 % (ref 34–46.6)
HGB BLD-MCNC: 10.6 G/DL (ref 12–15.9)
HGB UR QL STRIP.AUTO: NEGATIVE
HYALINE CASTS UR QL AUTO: ABNORMAL /LPF
IMM GRANULOCYTES # BLD AUTO: 0.1 10*3/MM3 (ref 0–0.05)
IMM GRANULOCYTES NFR BLD AUTO: 1 % (ref 0–0.5)
KETONES UR QL STRIP: NEGATIVE
LEUKOCYTE ESTERASE UR QL STRIP.AUTO: ABNORMAL
LIPASE SERPL-CCNC: 18 U/L (ref 13–60)
LYMPHOCYTES # BLD AUTO: 2.38 10*3/MM3 (ref 0.7–3.1)
LYMPHOCYTES NFR BLD AUTO: 24 % (ref 19.6–45.3)
MCH RBC QN AUTO: 23.8 PG (ref 26.6–33)
MCHC RBC AUTO-ENTMCNC: 29.6 G/DL (ref 31.5–35.7)
MCV RBC AUTO: 80.4 FL (ref 79–97)
MONOCYTES # BLD AUTO: 0.77 10*3/MM3 (ref 0.1–0.9)
MONOCYTES NFR BLD AUTO: 7.8 % (ref 5–12)
NEUTROPHILS NFR BLD AUTO: 6.31 10*3/MM3 (ref 1.7–7)
NEUTROPHILS NFR BLD AUTO: 63.8 % (ref 42.7–76)
NITRITE UR QL STRIP: NEGATIVE
NRBC BLD AUTO-RTO: 0 /100 WBC (ref 0–0.2)
PH UR STRIP.AUTO: 6.5 [PH] (ref 5–8)
PLATELET # BLD AUTO: 231 10*3/MM3 (ref 140–450)
PMV BLD AUTO: 9 FL (ref 6–12)
POTASSIUM SERPL-SCNC: 4.6 MMOL/L (ref 3.5–5.2)
PROT SERPL-MCNC: 7.5 G/DL (ref 6–8.5)
PROT UR QL STRIP: NEGATIVE
RBC # BLD AUTO: 4.45 10*6/MM3 (ref 3.77–5.28)
RBC # UR STRIP: ABNORMAL /HPF
REF LAB TEST METHOD: ABNORMAL
SODIUM SERPL-SCNC: 137 MMOL/L (ref 136–145)
SP GR UR STRIP: 1.02 (ref 1–1.03)
SQUAMOUS #/AREA URNS HPF: ABNORMAL /HPF
T4 FREE SERPL-MCNC: 1.2 NG/DL (ref 0.93–1.7)
TSH SERPL DL<=0.05 MIU/L-ACNC: 0.22 UIU/ML (ref 0.27–4.2)
UROBILINOGEN UR QL STRIP: ABNORMAL
WBC # UR STRIP: ABNORMAL /HPF
WBC NRBC COR # BLD AUTO: 9.9 10*3/MM3 (ref 3.4–10.8)

## 2024-06-10 PROCEDURE — P9612 CATHETERIZE FOR URINE SPEC: HCPCS

## 2024-06-10 PROCEDURE — 83690 ASSAY OF LIPASE: CPT | Performed by: EMERGENCY MEDICINE

## 2024-06-10 PROCEDURE — 74177 CT ABD & PELVIS W/CONTRAST: CPT

## 2024-06-10 PROCEDURE — 84439 ASSAY OF FREE THYROXINE: CPT | Performed by: EMERGENCY MEDICINE

## 2024-06-10 PROCEDURE — 71045 X-RAY EXAM CHEST 1 VIEW: CPT | Performed by: RADIOLOGY

## 2024-06-10 PROCEDURE — 71045 X-RAY EXAM CHEST 1 VIEW: CPT

## 2024-06-10 PROCEDURE — 81001 URINALYSIS AUTO W/SCOPE: CPT | Performed by: EMERGENCY MEDICINE

## 2024-06-10 PROCEDURE — 84443 ASSAY THYROID STIM HORMONE: CPT | Performed by: EMERGENCY MEDICINE

## 2024-06-10 PROCEDURE — 85025 COMPLETE CBC W/AUTO DIFF WBC: CPT | Performed by: EMERGENCY MEDICINE

## 2024-06-10 PROCEDURE — 25810000003 LACTATED RINGERS SOLUTION: Performed by: EMERGENCY MEDICINE

## 2024-06-10 PROCEDURE — 36415 COLL VENOUS BLD VENIPUNCTURE: CPT

## 2024-06-10 PROCEDURE — 99285 EMERGENCY DEPT VISIT HI MDM: CPT

## 2024-06-10 PROCEDURE — 74177 CT ABD & PELVIS W/CONTRAST: CPT | Performed by: RADIOLOGY

## 2024-06-10 PROCEDURE — 25510000001 IOPAMIDOL 61 % SOLUTION: Performed by: EMERGENCY MEDICINE

## 2024-06-10 PROCEDURE — 80053 COMPREHEN METABOLIC PANEL: CPT | Performed by: EMERGENCY MEDICINE

## 2024-06-10 RX ADMIN — SODIUM CHLORIDE, POTASSIUM CHLORIDE, SODIUM LACTATE AND CALCIUM CHLORIDE 1000 ML: 600; 310; 30; 20 INJECTION, SOLUTION INTRAVENOUS at 20:24

## 2024-06-10 RX ADMIN — IOPAMIDOL 88 ML: 612 INJECTION, SOLUTION INTRAVENOUS at 22:01

## 2024-06-11 VITALS
HEART RATE: 79 BPM | DIASTOLIC BLOOD PRESSURE: 59 MMHG | OXYGEN SATURATION: 90 % | BODY MASS INDEX: 25.11 KG/M2 | HEIGHT: 67 IN | RESPIRATION RATE: 17 BRPM | SYSTOLIC BLOOD PRESSURE: 105 MMHG | TEMPERATURE: 97.7 F | WEIGHT: 160 LBS

## 2024-06-11 NOTE — ED NOTES
Called [facility] dispatch and requested transport back to residence. Dispatcher said he will notify the [facility].

## 2024-06-11 NOTE — DISCHARGE INSTRUCTIONS
Follow-up with primary care doctor in the next 1 week.    Return to the emergency department for worsening pain, nausea, vomiting, fever, chills, sweats or any other concerning signs or symptoms.    Your CT scan did show a adrenal mass, this is incidental finding, however follow-up with your primary care doctor to discuss additional workup or MRI scan for further evaluation.

## 2024-06-11 NOTE — ED PROVIDER NOTES
Subjective   History of Present Illness  78-year-old female, with multiple medical problems, listed below, who presents today for evaluation of concern for abdominal pain, constipation.  Patient has history of chronic constipation, and is on laxatives daily.  The daughter is with the patient today thought the patient was having increased discomfort, and called EMS and the patient was brought here for further evaluation.  No nausea, no vomiting, no fever, chills, sweats.  Daughter at bedside says that currently the patient does not appear to be slightly improved.  No melena, no hematochezia.  Daughter was concerned that the abdomen was harder than normal earlier, however she says that it is softer now.    History provided by:  Relative  History limited by:  Dementia and patient nonverbal      Review of Systems   All other systems reviewed and are negative.      Past Medical History:   Diagnosis Date    A-fib     Acute on chronic renal insufficiency 7/15/2016    Anemia     Anxiety     Arthritis     Arthritis     Bipolar 1 disorder     Bradycardia     COPD (chronic obstructive pulmonary disease)     Coronary artery disease     Dementia     Disease of thyroid gland     Generalized weakness 7/14/2016    GERD (gastroesophageal reflux disease)     Heart disease     History of transfusion     Hyperlipidemia     Hypertension     PAD (peripheral artery disease)     Stroke     8/2016, 2/2017, 6/2017    TIA (transient ischemic attack)     Vertigo        No Known Allergies    Past Surgical History:   Procedure Laterality Date    ANGIOPLASTY SUBCLAVIAN ARTERY      CAROTID ENDARTERECTOMY Bilateral     COLONOSCOPY  2012    COLONOSCOPY N/A 2/24/2017    Procedure: COLONOSCOPY  CPTCODE:55399;  Surgeon: Suraj Quintanilla III, MD;  Location: [facility];  Service:     ENDOSCOPY      ENDOSCOPY N/A 2/24/2017    Procedure: ESOPHAGOGASTRODUODENOSCOPY WITH BIOPSY  CPTCODE:83113;  Surgeon: Suraj Quintanilla III, MD;  Location: [facility] OR;   "Service:     ENDOSCOPY W/ PEG TUBE PLACEMENT N/A 2017    Procedure: ESOPHAGOGASTRODUODENOSCOPY WITH PERCUTANEOUS ENDOSCOPIC GASTROSTOMY TUBE INSERTION;  Surgeon: Renan Montanez MD;  Location: [facility];  Service:     EYE SURGERY      bilat cataracts    RENAL ARTERY BYPASS         Family History   Problem Relation Age of Onset    Heart disease Mother     Diabetes Mother     Cancer Father     Diabetes Sister     Cancer Brother        Social History     Socioeconomic History    Marital status:    Tobacco Use    Smoking status: Former     Current packs/day: 0.00     Average packs/day: 1 pack/day for 40.0 years (40.0 ttl pk-yrs)     Types: Cigarettes     Start date: 1977     Quit date: 2017     Years since quittin.3    Smokeless tobacco: Never   Substance and Sexual Activity    Alcohol use: No     Comment: \"used to drink a lot\" none now  (per daughter)    Drug use: No    Sexual activity: Defer           Objective   Physical Exam  Vitals and nursing note reviewed.   Constitutional:       Appearance: She is well-developed.      Comments: 78-year-old female lying in bed no acute distress, nonverbal   HENT:      Head: Normocephalic.      Nose: Nose normal.   Eyes:      Conjunctiva/sclera: Conjunctivae normal.      Pupils: Pupils are equal, round, and reactive to light.   Neck:      Vascular: No JVD.      Trachea: No tracheal deviation.   Cardiovascular:      Rate and Rhythm: Normal rate and regular rhythm.   Pulmonary:      Effort: Pulmonary effort is normal.      Comments: No respiratory distress, no retractions, no abdominal breathing.  Abdominal:      Palpations: Abdomen is soft.      Comments: Abdomen soft, no guarding, rebound, rigidity or signs of peritonitis, no reproducible abdominal tenderness to palpation.   Musculoskeletal:         General: Normal range of motion.      Cervical back: Normal range of motion and neck supple.   Skin:     General: Skin is warm and dry.   Neurological:    "   Comments: Patient is awake and at baseline mental status, nonverbal.         Procedures           ED Course                                             Medical Decision Making  78-year-old female who presents today for with family for concern for constipation and abdominal pain.  Blood work is unremarkable no significant electrolyte abnormality, or infection.  CT abdomen pelvis was obtained and read as possible fecal impaction.  There is incidental finding of adrenal mass which was relayed to patient's daughter at the bedside.  A disimpaction was performed at the bedside, with mod amount of stool removed.  I think patient can safely be discharged home, with continued outpatient management.  Strict return precautions discussed, daughter voices understanding    Problems Addressed:  Fecal impaction: complicated acute illness or injury    Amount and/or Complexity of Data Reviewed  Labs: ordered.  Radiology: ordered.    Risk  Prescription drug management.        Final diagnoses:   Fecal impaction       ED Disposition  ED Disposition       ED Disposition   Discharge    Condition   Stable    Comment   --               No follow-up provider specified.       Medication List        Changed      atorvastatin 40 MG tablet  Commonly known as: LIPITOR  Take 1 tablet by mouth Daily.  What changed:   how to take this  when to take this     memantine 10 MG tablet  Commonly known as: NAMENDA  Take 1 tablet by mouth 2 (two) times a day.  What changed: how to take this     pantoprazole 40 MG EC tablet  Commonly known as: PROTONIX  Take 1 tablet by mouth 2 (Two) Times a Day Before Meals.  What changed:   how to take this  when to take this                 Shar Garay MD  06/11/24 0131

## 2025-07-18 DIAGNOSIS — I63.9 CEREBROVASCULAR ACCIDENT (CVA), UNSPECIFIED MECHANISM: Primary | ICD-10-CM

## 2025-07-18 DIAGNOSIS — Z51.5 END OF LIFE CARE: ICD-10-CM

## 2025-07-18 RX ORDER — LORAZEPAM 2 MG/ML
1 CONCENTRATE ORAL EVERY 4 HOURS PRN
Qty: 30 ML | Refills: 0 | Status: SHIPPED | OUTPATIENT
Start: 2025-07-18

## 2025-07-18 RX ORDER — MORPHINE SULFATE 100 MG/5ML
5 SOLUTION ORAL EVERY 4 HOURS PRN
Qty: 30 ML | Refills: 0 | Status: SHIPPED | OUTPATIENT
Start: 2025-07-18

## 2025-08-02 DIAGNOSIS — Z51.5 END OF LIFE CARE: Primary | ICD-10-CM

## 2025-08-02 RX ORDER — ATROPINE SULFATE 10 MG/ML
SOLUTION/ DROPS OPHTHALMIC
Qty: 2 ML | Refills: 0 | Status: SHIPPED | OUTPATIENT
Start: 2025-08-02

## (undated) DEVICE — Device: Brand: DEFENDO AIR/WATER/SUCTION AND BIOPSY VALVE

## (undated) DEVICE — Device: Brand: ENDOGATOR

## (undated) DEVICE — DEV INFL ALLIANCE2 SYS

## (undated) DEVICE — HOLDER: Brand: DEROYAL

## (undated) DEVICE — TUBING, SUCTION, 1/4" X 20', STRAIGHT: Brand: MEDLINE INDUSTRIES, INC.

## (undated) DEVICE — THE BITE BLOCK MAXI, LATEX FREE STRAP IS USED TO PROTECT THE ENDOSCOPE INSERTION TUBE FROM BEING BITTEN BY THE PATIENT.

## (undated) DEVICE — TUBING, SUCTION, 3/16" X 10', STRAIGHT: Brand: MEDLINE

## (undated) DEVICE — FRCP BX RADJAW4 NDL 2.8 240CM LG OG BX40

## (undated) DEVICE — PERCUTANEOUS ENDOSCOPIC GASTROSTOMY KIT: Brand: ENDOVIVE SAFETY PEG KIT

## (undated) DEVICE — SINGLE PORT MANIFOLD: Brand: NEPTUNE 2

## (undated) DEVICE — SPNG GZ WOVN 4X4IN 12PLY 10/BX STRL

## (undated) DEVICE — Device

## (undated) DEVICE — CONN Y IRR DISP 1P/U

## (undated) DEVICE — SYR LUERLOK 30CC

## (undated) DEVICE — SNAR POLYP CAPTIFLX MICRO OVL 13MM 240CM